# Patient Record
Sex: FEMALE | Race: WHITE | NOT HISPANIC OR LATINO | URBAN - METROPOLITAN AREA
[De-identification: names, ages, dates, MRNs, and addresses within clinical notes are randomized per-mention and may not be internally consistent; named-entity substitution may affect disease eponyms.]

---

## 2017-12-21 ENCOUNTER — APPOINTMENT (OUTPATIENT)
Dept: WOMENS IMAGING | Facility: HOSPITAL | Age: 52
End: 2017-12-21

## 2017-12-21 PROCEDURE — 77063 BREAST TOMOSYNTHESIS BI: CPT | Performed by: RADIOLOGY

## 2017-12-21 PROCEDURE — 77067 SCR MAMMO BI INCL CAD: CPT | Performed by: RADIOLOGY

## 2019-10-16 ENCOUNTER — APPOINTMENT (OUTPATIENT)
Dept: WOMENS IMAGING | Facility: HOSPITAL | Age: 54
End: 2019-10-16

## 2019-10-16 PROCEDURE — 77063 BREAST TOMOSYNTHESIS BI: CPT | Performed by: RADIOLOGY

## 2019-10-16 PROCEDURE — 77067 SCR MAMMO BI INCL CAD: CPT | Performed by: RADIOLOGY

## 2024-04-09 ENCOUNTER — APPOINTMENT (OUTPATIENT)
Dept: WOMENS IMAGING | Facility: HOSPITAL | Age: 59
End: 2024-04-09
Payer: COMMERCIAL

## 2024-04-09 PROCEDURE — 76642 ULTRASOUND BREAST LIMITED: CPT | Performed by: RADIOLOGY

## 2024-04-09 PROCEDURE — 77066 DX MAMMO INCL CAD BI: CPT | Performed by: RADIOLOGY

## 2024-04-09 PROCEDURE — 77062 BREAST TOMOSYNTHESIS BI: CPT | Performed by: RADIOLOGY

## 2024-04-09 PROCEDURE — G0279 TOMOSYNTHESIS, MAMMO: HCPCS | Performed by: RADIOLOGY

## 2024-04-16 ENCOUNTER — APPOINTMENT (OUTPATIENT)
Dept: WOMENS IMAGING | Facility: HOSPITAL | Age: 59
End: 2024-04-16
Payer: COMMERCIAL

## 2024-04-16 ENCOUNTER — LAB REQUISITION (OUTPATIENT)
Dept: LAB | Facility: HOSPITAL | Age: 59
End: 2024-04-16
Payer: COMMERCIAL

## 2024-04-16 DIAGNOSIS — N63.0 UNSPECIFIED LUMP IN UNSPECIFIED BREAST: ICD-10-CM

## 2024-04-16 PROCEDURE — 88360 TUMOR IMMUNOHISTOCHEM/MANUAL: CPT | Performed by: OBSTETRICS & GYNECOLOGY

## 2024-04-16 PROCEDURE — 88342 IMHCHEM/IMCYTCHM 1ST ANTB: CPT | Performed by: OBSTETRICS & GYNECOLOGY

## 2024-04-16 PROCEDURE — 88305 TISSUE EXAM BY PATHOLOGIST: CPT | Performed by: OBSTETRICS & GYNECOLOGY

## 2024-04-16 PROCEDURE — 38505 NEEDLE BIOPSY LYMPH NODES: CPT | Performed by: RADIOLOGY

## 2024-04-16 PROCEDURE — 19083 BX BREAST 1ST LESION US IMAG: CPT | Performed by: RADIOLOGY

## 2024-04-16 PROCEDURE — A4648 IMPLANTABLE TISSUE MARKER: HCPCS | Performed by: RADIOLOGY

## 2024-04-16 PROCEDURE — 88341 IMHCHEM/IMCYTCHM EA ADD ANTB: CPT | Performed by: OBSTETRICS & GYNECOLOGY

## 2024-04-18 ENCOUNTER — TELEPHONE (OUTPATIENT)
Dept: SURGERY | Facility: CLINIC | Age: 59
End: 2024-04-18
Payer: COMMERCIAL

## 2024-04-18 NOTE — TELEPHONE ENCOUNTER
New patient scheduled with Dr Mccain on 4/25 at 12:45 pm    Patient has been notified, gave verbal understanding and a new patient packet has been mailed.    Patient prep work sheet given to Halina to make chart /SD

## 2024-04-19 LAB
DX PRELIMINARY: NORMAL
LAB AP CASE REPORT: NORMAL
LAB AP CLINICAL INFORMATION: NORMAL
LAB AP DIAGNOSIS COMMENT: NORMAL
LAB AP SPECIAL STAINS: NORMAL
LAB AP SYNOPTIC CHECKLIST: NORMAL
PATH REPORT.FINAL DX SPEC: NORMAL
PATH REPORT.GROSS SPEC: NORMAL

## 2024-04-23 ENCOUNTER — TELEPHONE (OUTPATIENT)
Dept: SURGERY | Facility: CLINIC | Age: 59
End: 2024-04-23
Payer: COMMERCIAL

## 2024-04-23 NOTE — TELEPHONE ENCOUNTER
Called PT and left voicemail to reschedule her new PT appt with  due to  being in surgery at that time.    MEN

## 2024-04-24 ENCOUNTER — TELEPHONE (OUTPATIENT)
Dept: SURGERY | Facility: CLINIC | Age: 59
End: 2024-04-24
Payer: COMMERCIAL

## 2024-04-24 NOTE — TELEPHONE ENCOUNTER
Left PT a voicemail to reschedule her new PT appt tomorrow 04/25/2024 at 12:45 PM due to  being in surgery at this time.    MEN

## 2024-04-24 NOTE — PROGRESS NOTES
[facility]     Referring Provider: No ref. provider found     Chief complaint: breast mass and newly diagnosed breast cancer    Subjective    HPI: Ms. Sd Man is a 58 y.o. yo woman, seen at the request of Nely Jesus (OBGYN) for a new diagnosis of left breast cancer.      She noted an area of pain in her left breast that started several months ago.  She noted a palpable mass approximately 6 weeks ago.  She thought it might be due to increased size of the lymph node after a period of a scratchy throat.  She then presented to her OB/GYN who ordered diagnostic imaging and further workup.  She has not had recent imaging of her breast for screening.    She denies any prior history of abnormal mammograms or breast biopsies.     She reports she has a pertinent PMH of PCOS.  She has had significant stressors due to job changes and family health concerns including stressful care of her children over the past several years.  She underwent a screening colonoscopy and was found to have a suspicious polyp and close follow-up was recommended.  She has not been able to return for follow-up colonoscopy and is concerned there could be an underlying colon malignancy.  She also went through a complicated divorce before [facility] [place].    She was joined today in clinic by her friend. They all participated in the discussion, after her examination, and she gave her consent for them to participate.    Oncology/Hematology History    No history exists.       Review of Systems   Constitutional:  Negative for appetite change, fatigue and fever.   Respiratory:  Negative for cough and shortness of breath.    Gastrointestinal:  Negative for abdominal distention, diarrhea and nausea.   Musculoskeletal:  Negative for arthralgias and back pain.   Neurological:  Negative for dizziness, headaches and speech difficulty.   Psychiatric/Behavioral:  Positive for depression. Negative for decreased concentration and sleep disturbance. The patient is  nervous/anxious.        Medications:    Current Outpatient Medications:     azSTARys 39.2-7.8 MG capsule, , Disp: , Rfl:     Trintellix 10 MG tablet tablet, , Disp: , Rfl:     Allergies:  No Known Allergies    Medical history:  Past Medical History:   Diagnosis Date    ADHD     Depression     PCOS (polycystic ovarian syndrome)     PTSD (post-traumatic stress disorder)        Surgical History:  Past Surgical History:   Procedure Laterality Date    OOPHORECTOMY Bilateral 2014       Family History:  Family History   Problem Relation Age of Onset    Melanoma Mother 75        alive currently 79       Family Cancer History: relationship - (age of diagnosis/current age or age at death)  Breast: Denies  Ovarian: Denies  Colon: Denies  Pancreas: Denies  Prostate: Denies    Social History:   Social History     Socioeconomic History    Marital status:    Tobacco Use    Smoking status: Never     Passive exposure: Never    Smokeless tobacco: Never   Vaping Use    Vaping status: Never Used   Substance and Sexual Activity    Alcohol use: Never    Drug use: Never    Sexual activity: Not Currently       She lives by herself  She works as a , at [employer]         GYNECOLOGIC HISTORY:   G: 3. P: 3. AB: 0.  Last menstrual period: 47  Age at menarche: 14  Age at first childbirth: 32  Lactation/How lon years  Age at menopause: 47  Total years of oral contraceptive use: None  Total years of hormone replacement therapy: Several years      Objective   PHYSICAL EXAMINATION:   Vitals:    24 1332   BP: 158/82   Pulse: 101   Resp: 18   SpO2: 98%     Examination chaperoned by Halina.  ECOG 0 - Asymptomatic  General: NAD, well appearing  Psych: a&o x 3, anxious, tangential conversation  Eyes: EOMI, no scleral icterus  ENMT: neck supple without masses or thyromegaly, mucus membranes moist  Resp: normal effort  CV: RRR, no edema   GI: soft, NT, ND  MSK: normal gait, normal ROM in bilateral shoulders  Lymph nodes:   no cervical, supraclavicular or axillary lymphadenopathy, left axilla with palpable area in region of previous biopsy  Breast: symmetric, bra 42C, broad base of the breast  Right:  No visible abnormalities on inspection while seated, with arms raised or hands on hips. No masses, skin changes, or nipple abnormalities.  Left:  No visible abnormalities on inspection while seated. No skin changes, or nipple abnormalities.  Palpable 2 cm oval-shaped abnormality in left upper outer quadrant posterior.     Previous IMAGING: I have independently reviewed her imagin2024 bilateral diagnostic mammogram  The breasts are heterogeneously dense which may obscure small masses.  Finding 1: There is a high density irregular mass measuring 20 mm with indistinct margins seen in the posterior one third region left breast 2:00 11 cm from nipple.  Patient indicates a palpable lump or thickening in the left breast.  The symptomatic area is clearly marked.  In the right breast no suspicious masses significant calcifications or other abnormalities are seen.  Left breast ultrasound  Finding 1: Ultrasound demonstrates an irregular solid mass with indistinct margins measuring 20 x 17 x 20 mm seen in the posterior one third region of the left breast 2:00 11 cm from the nipple.  This correlates to the palpable area.  Finding 2: There is a lymph node measuring 20 x 9 x 13 mm seen in the left breast the cortex is prominent measuring up to 4 mm in thickness  Impression:  Finding 1: Solid mass in the left breast is suspicious.  Ultrasound-guided vacuum-assisted biopsy is recommended.  Finding 2: Lymph node in the left breast is suspicious FNA possible spring-loaded or limited core biopsy is recommended.  BI-RADS 4C    2024 left ultrasound-guided biopsy  Patient's left breast in the axillary position was imaged and the abnormal lymph node was localized.  Using a 12-gauge biopsy device 5 cores were obtained.  A HydroMARK tissue marker was  placed at the biopsy site.  2 view postprocedure mammogram and ultrasound confirmed the clip in the expected location.  Pathology returned as malignant and concordant.  Report describes a free-floating fragment of carcinoma, suspicious for metastasis.    PATHOLOGY:   4/16/24  1.  Left breast, 2:00, ultrasound-biopsies for mass: INVASIVE MAMMARY CARCINOMA, NO SPECIAL TYPE (INVASIVE DUCTAL CARCINOMA).               -Histologic grade: Abhi is logic score III (tubules = 3, nuclei = 3, mitosis = 2).               -No in situ component identified.               -Invasive carcinoma involves multiple tissue cores, measuring up to 14 mm maximally.               -No lymphovascular nor perineural invasion identified.     2. Lymph node, left axilla, core biopsies:               -Free floating fragment of carcinoma, suspicious for metastasis (see Comment).    Estrogen Receptor (ER) Status  Positive (greater than 10% of cells demonstrate nuclear positivity)   Percentage of Cells with Nuclear Positivity  %   Average Intensity of Staining  Strong   Test Type  Food and Drug Administration (FDA) cleared (test / vendor): ventana   Primary Antibody  SP1   Scoring System  Kathy   Proportion Score  5   Intensity Score  3   Total Kathy Score  8   Test(s) Performed     Progesterone Receptor (PgR) Status  Positive   Percentage of Cells with Nuclear Positivity  31-40%   Average Intensity of Staining  Moderate   Test Type  Food and Drug Administration (FDA) cleared (test / vendor): ventana   Primary Antibody  1E2   Scoring System  Kathy   Proportion Score  4   Intensity Score  2   Total Kathy Score  6   Test(s) Performed     HER2 by Immunohistochemistry  Negative (Score 1+)   Test Type  Food and Drug Administration (FDA) cleared (test / vendor): ventana   Primary Antibody  4B5   Test(s) Performed  Ki-67   Ki-67 Percentage of Positive Nuclei  95 %         Assessment & Plan   Assessment:  58 y.o. F with a new diagnosis of left  breast: Invasive ductal carcinoma grade III, ER/UT +, Her2 -; T1cN1/0M0, anatomic stage IB vs IIA, prognostic stage IB vs IA.      Discussion:  I had an extensive discussion with the patient and her family about the nature of her breast cancer diagnosis. We reviewed the components of breast tissue including ducts and lobules. We reviewed her pathology report in detail. We reviewed breast cancer histology, including stage, grade, ER/UT receptors, HER2 receptors and how this applies to her diagnosis. We reviewed the basics of systemic and local/regional management of breast cancer.      We discussed that in her case, systemic treatment would involve endocrine therapy and possibly chemotherapy. She will be referred to medical oncology postoperatively to discuss this further.     We reviewed potential surgical treatments to include partial mastectomy, mastectomy, sentinel lymph node biopsy and axillary node dissection and discussed the rationale associated with each approach. Regarding radiation therapy, we discussed that radiation is indicated in all cases of breast conservation and in only limited circumstances following mastectomy. Partial mastectomy with radiation and mastectomy were explained with option of reconstruction.  The patient was informed that from a survival standpoint, both procedures are oncologically equivalent.   We discussed that the primary goal of adjuvant radiation is to decrease the likelihood of local recurrence.       In her case, she is a good candidate for lumpectomy and she would like to proceed with breast conservation. We discussed that lumpectomy would require preoperative wire-localization. We also discussed the risk of positive margins and that she must have negative margins for lumpectomy to be an appropriate oncologic procedure. I will make every effort to obtain negative margins at her initial operation, but there is a 10-15% chance that she will require a second operation for  re-excision, or possibly a total mastectomy. We will not know the margin status until after her final pathology has returned.     I explained that most breast cancer is not hereditary, that I do not believe this plays a role in her case, and that she does not meet NCCN criteria for genetic testing. I would not recommend a bilateral mastectomy, since her risk of a second primary cancer is relatively low and endocrine therapy will further reduce her risk.  She is very concerned about her children's potential risk for breast cancer.  Discussed low likelihood of a genetic cause of her breast cancer.  And again reiterated per NCCN guidelines she does not meet criteria for genetic testing.    I described additional risks and potential complications associated with surgery, including, but not limited to, bleeding, infection, deformity/poor cosmetic result, chronic pain, numbness, seroma, hematoma, deep venous thrombosis, skin flap necrosis, disease recurrence and the possibility of requiring additional surgery. We also discussed other treatment options including the option of not undergoing any surgical treatment with a palliative rather than curative intent and the risks associated with this including disease progression. She expressed an understanding of these factors and wished to proceed.    We discussed axillary staging. I described the procedure for sentinel lymph node biopsy in detail, including the preoperative injection of a radiotracer and intraoperative injection of lymphazurin blue dye. I explained that this is a mapping test and not a cancer test, that all of the lymph nodes containing these dyes will be removed for complete testing by pathology, and that the results could impact the decision for adjuvant treatment or additional surgery.  The biopsy findings of a free-floating island of carcinoma and a lymph node within the breast, and clinically negative axilla would allow her to remain a candidate for  lumpectomy with sentinel lymph node biopsy, targeted excision of the previously biopsied lymph node within the breast.    I described additional risks and potential complications associated with surgery, including, but not limited to, bleeding, infection, complications related to blue dye, lymphedema, deformity/poor cosmetic result, chronic pain, neurovascular injury, numbness, seroma, hematoma, deep venous thrombosis, skin flap necrosis, disease recurrence and the possibility of requiring additional surgery. We also discussed other treatment options including the option of not undergoing any surgical treatment and the risks associated with this including disease progression. She expressed an understanding of these factors and wished to proceed.      Plan:  A multidisciplinary plan has been formulated for the patient:      # Breast Surgical Oncology:  -She is interested in possibly obtaining surgery and her cancer care in [place].  Discussed all surgical options of partial mastectomy (lumpectomy) versus mastectomy with sentinel lymph node sampling.  She will require preoperative localization of the lymph node and possibly localization of the breast mass depending on her final surgical decision.  - There may be a role for breast MRI to further delineate extent of disease versus multicentric breast cancer regarding lymph node finding.  - will need preop lab evaluation as listed below  -If she undergoes surgery here in [place] she would like to wait until the end of May if possible.    # Medical Oncology:  - Will refer postoperatively.  - Is Oncotype candidate  - would recommend 5 to 10 years of hormone blockade  - She is hesitant for any significant side effects that will make her feel ill including nausea, diarrhea, fatigue, hair loss.  I described the use of the Oncotype to help guide shared decision making regarding benefit of chemotherapy.    #  Radiation Oncology:  -Will refer postoperatively.    # Nurse  "Navigation  - Referral made today, very concerned about risk of side effects from treatment.   - not established with her PCP, and needs to be re-established   - family lives out of town, children with significant mental health concerns causing additional stress on top of recent cancer diagnoses.     # Lymphedema clinic  - Referral was placed today     # Breast Imaging  - Next Screening mammogram due: 4/2025  -Consider breast MRI pending surgical decision making    #General Medical work up  -Last PCP visit was in 2022, recommended medication regimen with metformin and insulin.  Will need to obtain preoperative evaluation of A1c to better delineate risk of wound healing problems.  Plan to complete preop EKG as well.  - looking for new PCP, discussed new PCP within [facility] if interested.   - Discussed role of follow up colonoscopy and she plans to follow-up on her own, does not require referral.  - after clinical visit - additional chart review noted additional problems listed from stephanie including abnormal cologuard in the past, in addition to her report of \"abnormal colonoscopy\"      Moira Mccain MD  Breast Surgical Oncology    I spent 110 minutes caring for Ms Man on this date of service. This time includes time spent by me in the following activities: preparing for the visit, reviewing tests, obtaining and/or reviewing a separately obtained history, performing a medically appropriate examination and/or evaluation , counseling and educating the patient/family/caregiver, referring and communicating with other health care professionals , documenting information in the medical record, independently interpreting results and communicating that information with the patient/family/caregiver, and care coordination.      CC:  Nely Jesus  "

## 2024-04-25 ENCOUNTER — TELEPHONE (OUTPATIENT)
Dept: SURGERY | Facility: CLINIC | Age: 59
End: 2024-04-25
Payer: COMMERCIAL

## 2024-04-25 ENCOUNTER — OFFICE VISIT (OUTPATIENT)
Dept: SURGERY | Facility: CLINIC | Age: 59
End: 2024-04-25
Payer: COMMERCIAL

## 2024-04-25 VITALS
HEART RATE: 101 BPM | WEIGHT: 242.4 LBS | OXYGEN SATURATION: 98 % | HEIGHT: 64 IN | RESPIRATION RATE: 18 BRPM | DIASTOLIC BLOOD PRESSURE: 82 MMHG | BODY MASS INDEX: 41.38 KG/M2 | SYSTOLIC BLOOD PRESSURE: 158 MMHG

## 2024-04-25 DIAGNOSIS — Z17.0 MALIGNANT NEOPLASM OF UPPER-OUTER QUADRANT OF LEFT BREAST IN FEMALE, ESTROGEN RECEPTOR POSITIVE: Primary | ICD-10-CM

## 2024-04-25 DIAGNOSIS — C50.412 MALIGNANT NEOPLASM OF UPPER-OUTER QUADRANT OF LEFT BREAST IN FEMALE, ESTROGEN RECEPTOR POSITIVE: Primary | ICD-10-CM

## 2024-04-25 PROBLEM — R19.5 POSITIVE COLORECTAL CANCER SCREENING USING COLOGUARD TEST: Status: ACTIVE | Noted: 2020-05-28

## 2024-04-25 PROBLEM — F90.0 ATTENTION DEFICIT HYPERACTIVITY DISORDER (ADHD), PREDOMINANTLY INATTENTIVE TYPE: Status: ACTIVE | Noted: 2017-03-18

## 2024-04-25 PROBLEM — E55.9 VITAMIN D DEFICIENCY: Status: ACTIVE | Noted: 2017-03-18

## 2024-04-25 RX ORDER — VORTIOXETINE 10 MG/1
TABLET, FILM COATED ORAL
COMMUNITY
Start: 2024-04-24

## 2024-04-25 RX ORDER — SERDEXMETHYLPHENIDATE AND DEXMETHYLPHENIDATE 7.8; 39.2 MG/1; MG/1
CAPSULE ORAL
COMMUNITY
Start: 2024-03-23

## 2024-04-25 NOTE — LETTER
April 25, 2024       No Recipients    Patient: Sd Man   YOB: 1965   Date of Visit: 4/25/2024     Dear Nely Jesus MD:       Thank you for referring Sd Man to me for evaluation. Below are the relevant portions of my assessment and plan of care.    If you have questions, please do not hesitate to call me. I look forward to following Sd along with you.         Sincerely,        Moira Mccain MD        CC:   No Recipients    Moira Mccain MD  04/25/24 1950  Sign when Signing Visit  [facility]     Referring Provider: No ref. provider found     Chief complaint: breast mass and newly diagnosed breast cancer    Subjective   HPI: Ms. Sd Man is a 58 y.o. yo woman, seen at the request of Nely Jesus (OBGYN) for a new diagnosis of left breast cancer.      She noted an area of pain in her left breast that started several months ago.  She noted a palpable mass approximately 6 weeks ago.  She thought it might be due to increased size of the lymph node after a period of a scratchy throat.  She then presented to her OB/GYN who ordered diagnostic imaging and further workup.  She has not had recent imaging of her breast for screening.    She denies any prior history of abnormal mammograms or breast biopsies.     She reports she has a pertinent PMH of PCOS.  She has had significant stressors due to job changes and family health concerns including stressful care of her children over the past several years.  She underwent a screening colonoscopy and was found to have a suspicious polyp and close follow-up was recommended.  She has not been able to return for follow-up colonoscopy and is concerned there could be an underlying colon malignancy.  She also went through a complicated divorce before [facility].    She was joined today in clinic by her friend. They all participated in the discussion, after her examination, and she gave her consent for them to participate.    Oncology/Hematology History     No history exists.       Review of Systems   Constitutional:  Negative for appetite change, fatigue and fever.   Respiratory:  Negative for cough and shortness of breath.    Gastrointestinal:  Negative for abdominal distention, diarrhea and nausea.   Musculoskeletal:  Negative for arthralgias and back pain.   Neurological:  Negative for dizziness, headaches and speech difficulty.   Psychiatric/Behavioral:  Positive for depression. Negative for decreased concentration and sleep disturbance. The patient is nervous/anxious.        Medications:    Current Outpatient Medications:   •  azSTARys 39.2-7.8 MG capsule, , Disp: , Rfl:   •  Trintellix 10 MG tablet tablet, , Disp: , Rfl:     Allergies:  No Known Allergies    Medical history:  Past Medical History:   Diagnosis Date   • ADHD    • Depression    • PCOS (polycystic ovarian syndrome)    • PTSD (post-traumatic stress disorder)        Surgical History:  Past Surgical History:   Procedure Laterality Date   • OOPHORECTOMY Bilateral 2014       Family History:  Family History   Problem Relation Age of Onset   • Melanoma Mother 75        alive currently 79       Family Cancer History: relationship - (age of diagnosis/current age or age at death)  Breast: Denies  Ovarian: Denies  Colon: Denies  Pancreas: Denies  Prostate: Denies    Social History:   Social History     Socioeconomic History   • Marital status:    Tobacco Use   • Smoking status: Never     Passive exposure: Never   • Smokeless tobacco: Never   Vaping Use   • Vaping status: Never Used   Substance and Sexual Activity   • Alcohol use: Never   • Drug use: Never   • Sexual activity: Not Currently       She lives by herself  She works as a , at [employer]         GYNECOLOGIC HISTORY:   G: 3. P: 3. AB: 0.  Last menstrual period: 47  Age at menarche: 14  Age at first childbirth: 32  Lactation/How lon years  Age at menopause: 47  Total years of oral contraceptive use: None  Total years of  hormone replacement therapy: Several years      Objective  PHYSICAL EXAMINATION:   Vitals:    24 1332   BP: 158/82   Pulse: 101   Resp: 18   SpO2: 98%     Examination chaperoned by Halina.  ECOG 0 - Asymptomatic  General: NAD, well appearing  Psych: a&o x 3, anxious, tangential conversation  Eyes: EOMI, no scleral icterus  ENMT: neck supple without masses or thyromegaly, mucus membranes moist  Resp: normal effort  CV: RRR, no edema   GI: soft, NT, ND  MSK: normal gait, normal ROM in bilateral shoulders  Lymph nodes:  no cervical, supraclavicular or axillary lymphadenopathy, left axilla with palpable area in region of previous biopsy  Breast: symmetric, bra 42C, broad base of the breast  Right:  No visible abnormalities on inspection while seated, with arms raised or hands on hips. No masses, skin changes, or nipple abnormalities.  Left:  No visible abnormalities on inspection while seated. No skin changes, or nipple abnormalities.  Palpable 2 cm oval-shaped abnormality in left upper outer quadrant posterior.     Previous IMAGING: I have independently reviewed her imagin2024 bilateral diagnostic mammogram  The breasts are heterogeneously dense which may obscure small masses.  Finding 1: There is a high density irregular mass measuring 20 mm with indistinct margins seen in the posterior one third region left breast 2:00 11 cm from nipple.  Patient indicates a palpable lump or thickening in the left breast.  The symptomatic area is clearly marked.  In the right breast no suspicious masses significant calcifications or other abnormalities are seen.  Left breast ultrasound  Finding 1: Ultrasound demonstrates an irregular solid mass with indistinct margins measuring 20 x 17 x 20 mm seen in the posterior one third region of the left breast 2:00 11 cm from the nipple.  This correlates to the palpable area.  Finding 2: There is a lymph node measuring 20 x 9 x 13 mm seen in the left breast the cortex is prominent  measuring up to 4 mm in thickness  Impression:  Finding 1: Solid mass in the left breast is suspicious.  Ultrasound-guided vacuum-assisted biopsy is recommended.  Finding 2: Lymph node in the left breast is suspicious FNA possible spring-loaded or limited core biopsy is recommended.  BI-RADS 4C    4/16/2024 left ultrasound-guided biopsy  Patient's left breast in the axillary position was imaged and the abnormal lymph node was localized.  Using a 12-gauge biopsy device 5 cores were obtained.  A HydroMARK tissue marker was placed at the biopsy site.  2 view postprocedure mammogram and ultrasound confirmed the clip in the expected location.  Pathology returned as malignant and concordant.  Report describes a free-floating fragment of carcinoma, suspicious for metastasis.    PATHOLOGY:   4/16/24  1.  Left breast, 2:00, ultrasound-biopsies for mass: INVASIVE MAMMARY CARCINOMA, NO SPECIAL TYPE (INVASIVE DUCTAL CARCINOMA).               -Histologic grade: Abhi is logic score III (tubules = 3, nuclei = 3, mitosis = 2).               -No in situ component identified.               -Invasive carcinoma involves multiple tissue cores, measuring up to 14 mm maximally.               -No lymphovascular nor perineural invasion identified.     2. Lymph node, left axilla, core biopsies:               -Free floating fragment of carcinoma, suspicious for metastasis (see Comment).    Estrogen Receptor (ER) Status  Positive (greater than 10% of cells demonstrate nuclear positivity)   Percentage of Cells with Nuclear Positivity  %   Average Intensity of Staining  Strong   Test Type  Food and Drug Administration (FDA) cleared (test / vendor): ventana   Primary Antibody  SP1   Scoring System  Kathy   Proportion Score  5   Intensity Score  3   Total Kathy Score  8   Test(s) Performed     Progesterone Receptor (PgR) Status  Positive   Percentage of Cells with Nuclear Positivity  31-40%   Average Intensity of Staining  Moderate    Test Type  Food and Drug Administration (FDA) cleared (test / vendor): ventana   Primary Antibody  1E2   Scoring System  Kathy   Proportion Score  4   Intensity Score  2   Total Kathy Score  6   Test(s) Performed     HER2 by Immunohistochemistry  Negative (Score 1+)   Test Type  Food and Drug Administration (FDA) cleared (test / vendor): ventana   Primary Antibody  4B5   Test(s) Performed  Ki-67   Ki-67 Percentage of Positive Nuclei  95 %         Assessment & Plan  Assessment:  58 y.o. F with a new diagnosis of left breast: Invasive ductal carcinoma grade III, ER/HI +, Her2 -; T1cN1/0M0, anatomic stage IB vs IIA, prognostic stage IB vs IA.      Discussion:  I had an extensive discussion with the patient and her family about the nature of her breast cancer diagnosis. We reviewed the components of breast tissue including ducts and lobules. We reviewed her pathology report in detail. We reviewed breast cancer histology, including stage, grade, ER/HI receptors, HER2 receptors and how this applies to her diagnosis. We reviewed the basics of systemic and local/regional management of breast cancer.      We discussed that in her case, systemic treatment would involve endocrine therapy and possibly chemotherapy. She will be referred to medical oncology postoperatively to discuss this further.     We reviewed potential surgical treatments to include partial mastectomy, mastectomy, sentinel lymph node biopsy and axillary node dissection and discussed the rationale associated with each approach. Regarding radiation therapy, we discussed that radiation is indicated in all cases of breast conservation and in only limited circumstances following mastectomy. Partial mastectomy with radiation and mastectomy were explained with option of reconstruction.  The patient was informed that from a survival standpoint, both procedures are oncologically equivalent.   We discussed that the primary goal of adjuvant radiation is to decrease  the likelihood of local recurrence.       In her case, she is a good candidate for lumpectomy and she would like to proceed with breast conservation. We discussed that lumpectomy would require preoperative wire-localization. We also discussed the risk of positive margins and that she must have negative margins for lumpectomy to be an appropriate oncologic procedure. I will make every effort to obtain negative margins at her initial operation, but there is a 10-15% chance that she will require a second operation for re-excision, or possibly a total mastectomy. We will not know the margin status until after her final pathology has returned.     I explained that most breast cancer is not hereditary, that I do not believe this plays a role in her case, and that she does not meet NCCN criteria for genetic testing. I would not recommend a bilateral mastectomy, since her risk of a second primary cancer is relatively low and endocrine therapy will further reduce her risk.  She is very concerned about her children's potential risk for breast cancer.  Discussed low likelihood of a genetic cause of her breast cancer.  And again reiterated per NCCN guidelines she does not meet criteria for genetic testing.    I described additional risks and potential complications associated with surgery, including, but not limited to, bleeding, infection, deformity/poor cosmetic result, chronic pain, numbness, seroma, hematoma, deep venous thrombosis, skin flap necrosis, disease recurrence and the possibility of requiring additional surgery. We also discussed other treatment options including the option of not undergoing any surgical treatment with a palliative rather than curative intent and the risks associated with this including disease progression. She expressed an understanding of these factors and wished to proceed.    We discussed axillary staging. I described the procedure for sentinel lymph node biopsy in detail, including the  preoperative injection of a radiotracer and intraoperative injection of lymphazurin blue dye. I explained that this is a mapping test and not a cancer test, that all of the lymph nodes containing these dyes will be removed for complete testing by pathology, and that the results could impact the decision for adjuvant treatment or additional surgery.  The biopsy findings of a free-floating island of carcinoma and a lymph node within the breast, and clinically negative axilla would allow her to remain a candidate for lumpectomy with sentinel lymph node biopsy, targeted excision of the previously biopsied lymph node within the breast.    I described additional risks and potential complications associated with surgery, including, but not limited to, bleeding, infection, complications related to blue dye, lymphedema, deformity/poor cosmetic result, chronic pain, neurovascular injury, numbness, seroma, hematoma, deep venous thrombosis, skin flap necrosis, disease recurrence and the possibility of requiring additional surgery. We also discussed other treatment options including the option of not undergoing any surgical treatment and the risks associated with this including disease progression. She expressed an understanding of these factors and wished to proceed.      Plan:  A multidisciplinary plan has been formulated for the patient:      # Breast Surgical Oncology:  -She is interested in possibly obtaining surgery and her cancer care in [place].  Discussed all surgical options of partial mastectomy (lumpectomy) versus mastectomy with sentinel lymph node sampling.  She will require preoperative localization of the lymph node and possibly localization of the breast mass depending on her final surgical decision.  - There may be a role for breast MRI to further delineate extent of disease versus multicentric breast cancer regarding lymph node finding.  - will need preop lab evaluation as listed below  -If she undergoes  "surgery here in [place] she would like to wait until the end of May if possible.    # Medical Oncology:  - Will refer postoperatively.  - Is Oncotype candidate  - would recommend 5 to 10 years of hormone blockade  - She is hesitant for any significant side effects that will make her feel ill including nausea, diarrhea, fatigue, hair loss.  I described the use of the Oncotype to help guide shared decision making regarding benefit of chemotherapy.    #  Radiation Oncology:  -Will refer postoperatively.    # Nurse Navigation  - Referral made today, very concerned about risk of side effects from treatment.   - not established with her PCP, and needs to be re-established   - family lives out of town, children with significant mental health concerns causing additional stress on top of recent cancer diagnoses.     # Lymphedema clinic  - Referral was placed today     # Breast Imaging  - Next Screening mammogram due: 4/2025  -Consider breast MRI pending surgical decision making    #General Medical work up  -Last PCP visit was in 2022, recommended medication regimen with metformin and insulin.  Will need to obtain preoperative evaluation of A1c to better delineate risk of wound healing problems.  Plan to complete preop EKG as well.  - looking for new PCP, discussed new PCP within [place] if interested.   - Discussed role of follow up colonoscopy and she plans to follow-up on her own, does not require referral.  - after clinical visit - additional chart review noted additional problems listed from stephanie including abnormal cologuard in the past, in addition to her report of \"abnormal colonoscopy\"      Moira Mccain MD  Breast Surgical Oncology    I spent 110 minutes caring for Ms Man on this date of service. This time includes time spent by me in the following activities: preparing for the visit, reviewing tests, obtaining and/or reviewing a separately obtained history, performing a medically appropriate examination and/or " evaluation , counseling and educating the patient/family/caregiver, referring and communicating with other health care professionals , documenting information in the medical record, independently interpreting results and communicating that information with the patient/family/caregiver, and care coordination.      CC:  Nely Jesus

## 2024-04-25 NOTE — TELEPHONE ENCOUNTER
Left message with PT that  would like to see PT today at 01:00 PM due to her being in surgery at 12:45 PM.    MEN

## 2024-04-25 NOTE — TELEPHONE ENCOUNTER
Left PT a voicemail about her appointment today with  at 12:45 PM.  said she would like for her to come in at 01:00 PM after her surgery today.    MEN

## 2024-04-29 ENCOUNTER — PATIENT OUTREACH (OUTPATIENT)
Dept: OTHER | Facility: HOSPITAL | Age: 59
End: 2024-04-29
Payer: COMMERCIAL

## 2024-04-29 ENCOUNTER — TELEPHONE (OUTPATIENT)
Dept: SURGERY | Facility: CLINIC | Age: 59
End: 2024-04-29
Payer: COMMERCIAL

## 2024-04-29 NOTE — TELEPHONE ENCOUNTER
4/30/2024  Called Ms Valenzuela to discuss next steps. Recommend Breast MRI, order was placed today.   Also will have her follow up in the office to sign consents and go over all results.   The office will call her to confirm these results.    NO answer but left a message per patients request about plan.  Will discuss in the office soon.          Caller: ROLANDO VALENZUELA    Relationship: SELF    Best call back number: [phone]     What is the best time to reach you: ANYTIME    Who are you requesting to speak with (clinical staff, provider,  specific staff member): SURGERY SCHEDULER    Do you know the name of the person who called:     What was the call regarding: PT IS WANTING TO GO AHEAD AND SCHEDULE SURGERY W/ DR CHOWDHURY INSTEAD OF PURSUING IN [place]    Is it okay if the provider responds through [employer]: YES, ALSO OKAY TO LEAVE DETAILED VM

## 2024-04-30 ENCOUNTER — TELEPHONE (OUTPATIENT)
Dept: SURGERY | Facility: CLINIC | Age: 59
End: 2024-04-30
Payer: COMMERCIAL

## 2024-04-30 DIAGNOSIS — C50.412 MALIGNANT NEOPLASM OF UPPER-OUTER QUADRANT OF LEFT BREAST IN FEMALE, ESTROGEN RECEPTOR POSITIVE: Primary | ICD-10-CM

## 2024-04-30 DIAGNOSIS — Z17.0 MALIGNANT NEOPLASM OF UPPER-OUTER QUADRANT OF LEFT BREAST IN FEMALE, ESTROGEN RECEPTOR POSITIVE: Primary | ICD-10-CM

## 2024-04-30 NOTE — TELEPHONE ENCOUNTER
I called Ms. Man to discuss her upcoming appts with Dr. Mccain.    MRI: 05/10/2024 at 03:45 PM    Follow up with Dr. Mccain: 05/15/2024 at 02:00 PM    PT wanted to know why shehad to have the MRI done before seeing  again. I explained that Dr. Mccain wants to have more imaging done before they discuss surgery options. PT requested a call back from practice manger.    MEN

## 2024-04-30 NOTE — PROGRESS NOTES
Referral received from Dr. Mccain's office. I called Ms. Man and introduced myself and navigational services. She stated the consult with Dr. Mccain went well and she is leaning toward a lumpectomy.  She has a good understanding of her pathology and treatment options. She was able to verbalize teach back on her plan of care.      She stated she has a wonderful support system with friends, coworkers and family. She stated she feels comfortable talking to them about needs or issues.      She stated she has no financial or transportation concerns at this time. She has no resource needs or ongoing concerns at this time.     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 She is concerned radiation may make her too fatigued to do her job well. Encouraged her to talk with Dr. Mccain and her radiation oncologist about these symptoms in more depth and to work with their offices for any FMLA or short term disability forms she may need. She stated she would do that.      She stated she has been anxious about her diagnosis and we discussed that can be normal. She is seeing a therapist regularly and will continue to do so throughout treatment. She stated her daughter struggles with mental health issues and they are working with her on support. We discussed we have support options for herself as she works through her diagnosis if needed. She was thankful for the information.      We discussed integrative therapies and other [facility]. She will received a navigation folder with the following information in the mail:     [facility], Cancer and Restorative Exercise (CARE), [employer] Exercise program, Guide for the Newly Diagnosed, [employer], [facility], Massage Therapy, Reiki Therapy, [employer] [place], Cancer Nutrition, and Survivorship Clinic.     She verbalized appreciation for navigational services and  she has my contact information and will call with any questions that arise.

## 2024-05-01 ENCOUNTER — TELEPHONE (OUTPATIENT)
Dept: SURGERY | Facility: CLINIC | Age: 59
End: 2024-05-01
Payer: COMMERCIAL

## 2024-05-01 NOTE — TELEPHONE ENCOUNTER
Attempted x 2 to reach this pt regarding her confusion surrounding her surgery scheduling.    Pt is confused as to why an MRI is needed prior to surgery.    This is needed for her surgical planning and would have been ordered at the time of her visit btu Sd was not sure if she wanted to proceed with having surgery here or out of state.  Once she called back to let us know she would like to proceed the MRI was ordered.   She still needs to have her breast imaged prior to her appointment to plan her surgery with Dr Mccain.     CMA

## 2024-05-10 ENCOUNTER — HOSPITAL ENCOUNTER (OUTPATIENT)
Dept: MRI IMAGING | Facility: HOSPITAL | Age: 59
Discharge: HOME | End: 2024-05-10
Admitting: STUDENT IN AN ORGANIZED HEALTH CARE EDUCATION/TRAINING PROGRAM
Payer: COMMERCIAL

## 2024-05-10 DIAGNOSIS — Z17.0 MALIGNANT NEOPLASM OF UPPER-OUTER QUADRANT OF LEFT BREAST IN FEMALE, ESTROGEN RECEPTOR POSITIVE: ICD-10-CM

## 2024-05-10 DIAGNOSIS — C50.412 MALIGNANT NEOPLASM OF UPPER-OUTER QUADRANT OF LEFT BREAST IN FEMALE, ESTROGEN RECEPTOR POSITIVE: ICD-10-CM

## 2024-05-10 PROCEDURE — 0 GADOBENATE DIMEGLUMINE 529 MG/ML SOLUTION: Performed by: STUDENT IN AN ORGANIZED HEALTH CARE EDUCATION/TRAINING PROGRAM

## 2024-05-10 PROCEDURE — 77049 MRI BREAST C-+ W/CAD BI: CPT

## 2024-05-10 PROCEDURE — A9577 INJ MULTIHANCE: HCPCS | Performed by: STUDENT IN AN ORGANIZED HEALTH CARE EDUCATION/TRAINING PROGRAM

## 2024-05-10 RX ADMIN — GADOBENATE DIMEGLUMINE 20 ML: 529 INJECTION, SOLUTION INTRAVENOUS at 17:26

## 2024-05-13 DIAGNOSIS — Z17.0 MALIGNANT NEOPLASM OF UPPER-OUTER QUADRANT OF LEFT BREAST IN FEMALE, ESTROGEN RECEPTOR POSITIVE: Primary | ICD-10-CM

## 2024-05-13 DIAGNOSIS — C50.412 MALIGNANT NEOPLASM OF UPPER-OUTER QUADRANT OF LEFT BREAST IN FEMALE, ESTROGEN RECEPTOR POSITIVE: Primary | ICD-10-CM

## 2024-05-14 ENCOUNTER — PATIENT OUTREACH (OUTPATIENT)
Dept: OTHER | Facility: HOSPITAL | Age: 59
End: 2024-05-14
Payer: COMMERCIAL

## 2024-05-14 NOTE — PROGRESS NOTES
Called Ms. Man to see how she was doing. Left a message with my contact information and asked her to call back at her convenience.

## 2024-05-15 ENCOUNTER — OFFICE VISIT (OUTPATIENT)
Dept: SURGERY | Facility: CLINIC | Age: 59
End: 2024-05-15
Payer: COMMERCIAL

## 2024-05-15 VITALS
DIASTOLIC BLOOD PRESSURE: 76 MMHG | OXYGEN SATURATION: 97 % | WEIGHT: 242 LBS | HEART RATE: 107 BPM | BODY MASS INDEX: 41.32 KG/M2 | HEIGHT: 64 IN | SYSTOLIC BLOOD PRESSURE: 138 MMHG

## 2024-05-15 DIAGNOSIS — C50.412 MALIGNANT NEOPLASM OF UPPER-OUTER QUADRANT OF LEFT BREAST IN FEMALE, ESTROGEN RECEPTOR POSITIVE: Primary | ICD-10-CM

## 2024-05-15 DIAGNOSIS — Z17.0 MALIGNANT NEOPLASM OF UPPER-OUTER QUADRANT OF LEFT BREAST IN FEMALE, ESTROGEN RECEPTOR POSITIVE: Primary | ICD-10-CM

## 2024-05-15 PROCEDURE — 99214 OFFICE O/P EST MOD 30 MIN: CPT | Performed by: STUDENT IN AN ORGANIZED HEALTH CARE EDUCATION/TRAINING PROGRAM

## 2024-05-15 RX ORDER — DEXTROAMPHETAMINE SACCHARATE, AMPHETAMINE ASPARTATE, DEXTROAMPHETAMINE SULFATE AND AMPHETAMINE SULFATE 3.125; 3.125; 3.125; 3.125 MG/1; MG/1; MG/1; MG/1
TABLET ORAL
COMMUNITY
Start: 2024-04-24

## 2024-05-15 NOTE — PROGRESS NOTES
[facility]     Referring Provider: Pau Jesus MD     Chief complaint: breast mass and newly diagnosed breast cancer    Subjective    HPI: Ms. Sd Man is a 58 y.o. yo woman, seen at the request of Nely Jesus (OBGYN) for a new diagnosis of left breast cancer.      She noted an area of pain in her left breast that started several months ago.  She noted a palpable mass approximately 6 weeks ago.  She thought it might be due to increased size of the lymph node after a period of a scratchy throat.  She then presented to her OB/GYN who ordered diagnostic imaging and further workup.  She has not had recent imaging of her breast for screening.    She denies any prior history of abnormal mammograms or breast biopsies.     She reports she has a pertinent PMH of PCOS.  She has had significant stressors due to job changes and family health concerns including stressful care of her children over the past several years.  She underwent a screening colonoscopy and was found to have a suspicious polyp and close follow-up was recommended.  She has not been able to return for follow-up colonoscopy and is concerned there could be an underlying colon malignancy.  She also went through a complicated divorce before [facility].    She was joined today in clinic by her friend. They all participated in the discussion, after her examination, and she gave her consent for them to participate.    HPI 5/15/24  She completed breast MRI.  She notes the lesion in her left lateral breast feels subjectively larger.  The area continues to be painful and heavy feeling.  Denies any new symptoms.    Oncology/Hematology History   Malignant neoplasm of upper-outer quadrant of left breast in female, estrogen receptor positive   4/16/2024 Cancer Staged    Staging form: Breast, AJCC 8th Edition  - Clinical stage from 4/16/2024: Stage IIB (cT2, cN1(f), cM0, G3, ER+, IA+, HER2-) - Signed by Moira Mccain MD on 5/15/2024     4/30/2024 Initial  Diagnosis    Malignant neoplasm of upper-outer quadrant of left breast in female, estrogen receptor positive         Review of Systems   Constitutional:  Negative for appetite change, fatigue and fever.   Respiratory:  Negative for cough and shortness of breath.    Gastrointestinal:  Negative for abdominal distention, diarrhea and nausea.   Musculoskeletal:  Negative for arthralgias and back pain.   Neurological:  Negative for dizziness, headaches and speech difficulty.   Psychiatric/Behavioral:  Positive for depression. Negative for decreased concentration and sleep disturbance. The patient is nervous/anxious.        Medications:    Current Outpatient Medications:     amphetamine-dextroamphetamine (ADDERALL) 12.5 MG tablet, TAKE 1 TABLET BY MOUTH TWICE DAILY IN THE PM, Disp: , Rfl:     azSTARys 39.2-7.8 MG capsule, , Disp: , Rfl:     Trintellix 10 MG tablet tablet, , Disp: , Rfl:     Allergies:  No Known Allergies    Medical history:  Past Medical History:   Diagnosis Date    ADHD     Depression     Obesity 11/11/2012    PCOS (polycystic ovarian syndrome)     PTSD (post-traumatic stress disorder)        Surgical History:  Past Surgical History:   Procedure Laterality Date    OOPHORECTOMY Bilateral 01/01/2014       Family History:  Family History   Problem Relation Age of Onset    Melanoma Mother 75        alive currently 79       Family Cancer History: relationship - (age of diagnosis/current age or age at death)  Breast: Denies  Ovarian: Denies  Colon: Denies  Pancreas: Denies  Prostate: Denies    Social History:   Social History     Socioeconomic History    Marital status:    Tobacco Use    Smoking status: Never     Passive exposure: Never    Smokeless tobacco: Never   Vaping Use    Vaping status: Never Used   Substance and Sexual Activity    Alcohol use: Never    Drug use: Never    Sexual activity: Not Currently       She lives by herself  She works as a , at [employer]  HISTORY:   G: 3. P: 3. AB: 0.  Last menstrual period: 47  Age at menarche: 14  Age at first childbirth: 32  Lactation/How lon years  Age at menopause: 47  Total years of oral contraceptive use: None  Total years of hormone replacement therapy: Several years      Objective   PHYSICAL EXAMINATION:   Vitals:    05/15/24 1359   BP: 138/76   Pulse: 107   SpO2: 97%       Examination chaperoned by Halina.  ECOG 0 - Asymptomatic  General: NAD, well appearing  Psych: a&o x 3, anxious, tangential conversation  Eyes: EOMI, no scleral icterus  ENMT: neck supple without masses or thyromegaly, mucus membranes moist  Resp: normal effort  CV: RRR, no edema   GI: soft, NT, ND  MSK: normal gait, normal ROM in bilateral shoulders  Lymph nodes:  no cervical, supraclavicular or axillary lymphadenopathy, left axilla with palpable area in region of previous biopsy  Breast: symmetric, bra 42C, broad base of the breast  Right:  No visible abnormalities on inspection while seated, with arms raised or hands on hips. No masses, skin changes, or nipple abnormalities.  Left:  No visible abnormalities on inspection while seated. No skin changes, or nipple abnormalities.  Palpable 2 cm oval-shaped abnormality in left upper outer quadrant posterior.     Previous IMAGING: I have independently reviewed her imagin2024 bilateral diagnostic mammogram  The breasts are heterogeneously dense which may obscure small masses.  Finding 1: There is a high density irregular mass measuring 20 mm with indistinct margins seen in the posterior one third region left breast 2:00 11 cm from nipple.  Patient indicates a palpable lump or thickening in the left breast.  The symptomatic area is clearly marked.  In the right breast no suspicious masses significant calcifications or other abnormalities are seen.  Left breast ultrasound  Finding 1: Ultrasound demonstrates an irregular solid mass with indistinct margins measuring 20 x 17 x 20 mm seen in the posterior  one third region of the left breast 2:00 11 cm from the nipple.  This correlates to the palpable area.  Finding 2: There is a lymph node measuring 20 x 9 x 13 mm seen in the left breast the cortex is prominent measuring up to 4 mm in thickness  Impression:  Finding 1: Solid mass in the left breast is suspicious.  Ultrasound-guided vacuum-assisted biopsy is recommended.  Finding 2: Lymph node in the left breast is suspicious FNA possible spring-loaded or limited core biopsy is recommended.  BI-RADS 4C    4/16/2024 left ultrasound-guided biopsy  Patient's left breast in the axillary position was imaged and the abnormal lymph node was localized.  Using a 12-gauge biopsy device 5 cores were obtained.  A HydroMARK tissue marker was placed at the biopsy site.  2 view postprocedure mammogram and ultrasound confirmed the clip in the expected location.  Pathology returned as malignant and concordant.  Report describes a free-floating fragment of carcinoma, suspicious for metastasis.      5/10/24 Breast MRI  FINDINGS: Scattered fibroglandular tissue is seen throughout both  breast. Mild background parenchymal enhancement of both breasts is  noted.     In the posterior one third lateral aspect of the left breast at the 3  o'clock position on the order of 12 cm from the nipple. There is an  irregular enhancing mass that measures 2.6 cm in anterior to posterior  dimension, 2.4 cm in the superior to inferior dimension and 2.7 cm in  the mediolateral dimension. A focal signal void is seen centrally within  the mass. This represents the biopsy-proven site of malignancy with the  internal mini cork shaped metallic clip.     No other areas of suspicious enhancement or morphology are seen in the  left breast. I see no evidence for abnormal skin, nipple or chest wall  enhancement of the left breast. There is an inferior left axillary lymph  node that measures on the order of 2 cm in greatest dimension and shows  diffuse cortical  thickening with the cortex measuring up to 0.8 cm in  thickness. Mild surrounding edema is noted. This is in the expected  location of the biopsied lymph node with the associated coil-shaped  HydroMARK metallic clip at the inferior margin seen on the mammogram  dated 04/16/2024. No evidence for left internal mammary chain adenopathy  is appreciated.     There are no areas of suspicious enhancement or morphology in the right  breast. I see no evidence for abnormal skin, nipple or chest wall  enhancement of the right breast and there is no evidence for right  axillary or internal mammary chain adenopathy.        IMPRESSION:  1. Biopsy-proven malignancy in the left breast in the posterior one  third at the 3 o'clock position that measures on the order of 2.7 cm in  greatest dimension. No other suspicious findings are seen within the  left breast. A left axillary lymph node that has undergone prior biopsy  and shows diffuse cortical thickening is noted and is suspicious for  left axillary adenopathy are noted.  2. There are no findings suspicious for malignancy in the right breast.    PATHOLOGY:   4/16/24  1.  Left breast, 2:00, ultrasound-biopsies for mass: INVASIVE MAMMARY CARCINOMA, NO SPECIAL TYPE (INVASIVE DUCTAL CARCINOMA).               -Histologic grade: Rosanky is logic score III (tubules = 3, nuclei = 3, mitosis = 2).               -No in situ component identified.               -Invasive carcinoma involves multiple tissue cores, measuring up to 14 mm maximally.               -No lymphovascular nor perineural invasion identified.     2. Lymph node, left axilla, core biopsies:               -Free floating fragment of carcinoma, suspicious for metastasis (see Comment).    Estrogen Receptor (ER) Status  Positive (greater than 10% of cells demonstrate nuclear positivity)   Percentage of Cells with Nuclear Positivity  %   Average Intensity of Staining  Strong   Test Type  Food and Drug Administration (FDA)  cleared (test / vendor): ventana   Primary Antibody  SP1   Scoring System  Kathy   Proportion Score  5   Intensity Score  3   Total Kathy Score  8   Test(s) Performed     Progesterone Receptor (PgR) Status  Positive   Percentage of Cells with Nuclear Positivity  31-40%   Average Intensity of Staining  Moderate   Test Type  Food and Drug Administration (FDA) cleared (test / vendor): ventana   Primary Antibody  1E2   Scoring System  Kathy   Proportion Score  4   Intensity Score  2   Total Kathy Score  6   Test(s) Performed     HER2 by Immunohistochemistry  Negative (Score 1+)   Test Type  Food and Drug Administration (FDA) cleared (test / vendor): [employer]   Primary Antibody  4B5   Test(s) Performed  Ki-67   Ki-67 Percentage of Positive Nuclei  95 %         Assessment & Plan   Assessment:  58 y.o. F with a new diagnosis of left breast: Invasive ductal carcinoma grade III, ER/MT +, Her2 -; T1cN1/0M0, anatomic stage IIB, prognostic stage IIB.      Discussion:  I had an extensive discussion with the patient and her family about the nature of her breast cancer diagnosis. We reviewed the components of breast tissue including ducts and lobules. We reviewed her pathology report in detail. We reviewed breast cancer histology, including stage, grade, ER/MT receptors, HER2 receptors and how this applies to her diagnosis. We reviewed the basics of systemic and local/regional management of breast cancer.      We discussed that in her case, systemic treatment would involve endocrine therapy and possibly chemotherapy. She will be referred to medical oncology postoperatively to discuss this further.     We reviewed potential surgical treatments to include partial mastectomy, mastectomy, sentinel lymph node biopsy and axillary node dissection and discussed the rationale associated with each approach. Regarding radiation therapy, we discussed that radiation is indicated in all cases of breast conservation and in only limited  circumstances following mastectomy. Partial mastectomy with radiation and mastectomy were explained with option of reconstruction.  The patient was informed that from a survival standpoint, both procedures are oncologically equivalent.   We discussed that the primary goal of adjuvant radiation is to decrease the likelihood of local recurrence.       With further imaging and delineation of disease she was found to have a T2 breast lesion and axillary radha involvement.  As she now has stage IIb breast cancer standard of care would require staging scans be completed.  Pending these results she remains a partial mastectomy versus mastectomy candidate.  She remains anxious about starting chemotherapy.  As she has palpable N1 disease standard of care would require axillary dissection at the time of surgery.  Discussed possible downstaging of her axilla with neoadjuvant chemotherapy should her staging scans show no metastatic disease.  Discussed all algorithm of options including surgery first versus neoadjuvant therapy.  Possible role in treatment for metastatic disease should that be identified on upcoming imaging.      I explained that most breast cancer is not hereditary, that I do not believe this plays a role in her case, and that she does not meet NCCN criteria for genetic testing. I would not recommend a bilateral mastectomy, since her risk of a second primary cancer is relatively low and endocrine therapy will further reduce her risk.  She is very concerned about her children's potential risk for breast cancer.  Discussed low likelihood of a genetic cause of her breast cancer.  And again reiterated per NCCN guidelines she does not meet criteria for genetic testing.    I described additional risks and potential complications associated with surgery, including, but not limited to, bleeding, infection, deformity/poor cosmetic result, chronic pain, numbness, seroma, hematoma, deep venous thrombosis, skin flap  necrosis, disease recurrence and the possibility of requiring additional surgery. We also discussed other treatment options including the option of not undergoing any surgical treatment with a palliative rather than curative intent and the risks associated with this including disease progression. She expressed an understanding of these factors and wished to proceed.      Plan:  A multidisciplinary plan has been formulated for the patient:      # Breast Surgical Oncology:  -If no metastatic lesions found on staging scans would be candidate for mastectomy versus partial mastectomy and axillary node dissection with surgery first treatment.  - with palpable lymph node she would require dissection at time of surgery  - clinical exam with palpable axillary lesion and breast masses, stage IIB breast cancer, complete preop staging scans to ensure to metastatic disease.  - Follow-up after staging scans completed    # Medical Oncology:  - T2N1 disease, follow up staging scans.  - Will discuss at tumor board possible role of neoadjuvant chemotherapy should her staging scans come back negative.  - depending on extent of radha burden of disease may be an oncotype candidate    #  Radiation Oncology:  -Will refer postoperatively.    # Nurse Navigation  - Referral made today, very concerned about risk of side effects from treatment.   - not established with her PCP, and needs to be re-established   - family lives out of town, children with significant mental health concerns causing additional stress on top of recent cancer diagnoses.     # Lymphedema clinic  - Referral was placed previously    # Breast Imaging  - Next Screening mammogram due: 4/2025  -Staging scans with CT chest, abdomen, pelvis.  Bone scan ordered today    #General Medical work up  -Last PCP visit was in 2022, recommended medication regimen with metformin and insulin.  Will need to obtain preoperative evaluation of A1c to better delineate risk of wound healing  "problems.  Plan to complete preop EKG as well.  - looking for new PCP, discussed [facility] PCP within [facility] if interested.   - Discussed role of follow up colonoscopy and she plans to follow-up on her own, does not require referral.  - after clinical visit - additional chart review noted additional problems listed from stephanie including abnormal cologuard in the past, in addition to her report of \"abnormal colonoscopy\"      Moira Mccain MD  Breast Surgical Oncology    I spent 30 minutes caring for Ms Man on this date of service. This time includes time spent by me in the following activities: preparing for the visit, reviewing tests, obtaining and/or reviewing a separately obtained history, performing a medically appropriate examination and/or evaluation , counseling and educating the patient/family/caregiver, referring and communicating with other health care professionals , documenting information in the medical record, independently interpreting results and communicating that information with the patient/family/caregiver, and care coordination.      CC:  Nely Jesus  "

## 2024-05-15 NOTE — LETTER
May 15, 2024       No Recipients    Patient: Sd Man   YOB: 1965   Date of Visit: 5/15/2024     Dear Nely Jesus MD:       Thank you for referring Sd Man to me for evaluation. Below are the relevant portions of my assessment and plan of care.    If you have questions, please do not hesitate to call me. I look forward to following Sd along with you.         Sincerely,        Moira Mccain MD        CC:   No Recipients    Moira Mccain MD  05/15/24 6949  Sign when Signing Visit  [facility]     Referring Provider: Pau Jesus MD     Chief complaint: breast mass and newly diagnosed breast cancer    Subjective   HPI: Ms. Sd Man is a 58 y.o. yo woman, seen at the request of Nely Jesus (OBGYN) for a new diagnosis of left breast cancer.      She noted an area of pain in her left breast that started several months ago.  She noted a palpable mass approximately 6 weeks ago.  She thought it might be due to increased size of the lymph node after a period of a scratchy throat.  She then presented to her OB/GYN who ordered diagnostic imaging and further workup.  She has not had recent imaging of her breast for screening.    She denies any prior history of abnormal mammograms or breast biopsies.     She reports she has a pertinent PMH of PCOS.  She has had significant stressors due to job changes and family health concerns including stressful care of her children over the past several years.  She underwent a screening colonoscopy and was found to have a suspicious polyp and close follow-up was recommended.  She has not been able to return for follow-up colonoscopy and is concerned there could be an underlying colon malignancy.  She also went through a complicated divorce before [facility].    She was joined today in clinic by her friend. They all participated in the discussion, after her examination, and she gave her consent for them to participate.    HPI 5/15/24  She completed  breast MRI.  She notes the lesion in her left lateral breast feels subjectively larger.  The area continues to be painful and heavy feeling.  Denies any new symptoms.    Oncology/Hematology History   Malignant neoplasm of upper-outer quadrant of left breast in female, estrogen receptor positive   4/16/2024 Cancer Staged    Staging form: Breast, AJCC 8th Edition  - Clinical stage from 4/16/2024: Stage IIB (cT2, cN1(f), cM0, G3, ER+, CA+, HER2-) - Signed by Moira Mccain MD on 5/15/2024     4/30/2024 Initial Diagnosis    Malignant neoplasm of upper-outer quadrant of left breast in female, estrogen receptor positive         Review of Systems   Constitutional:  Negative for appetite change, fatigue and fever.   Respiratory:  Negative for cough and shortness of breath.    Gastrointestinal:  Negative for abdominal distention, diarrhea and nausea.   Musculoskeletal:  Negative for arthralgias and back pain.   Neurological:  Negative for dizziness, headaches and speech difficulty.   Psychiatric/Behavioral:  Positive for depression. Negative for decreased concentration and sleep disturbance. The patient is nervous/anxious.        Medications:    Current Outpatient Medications:   •  amphetamine-dextroamphetamine (ADDERALL) 12.5 MG tablet, TAKE 1 TABLET BY MOUTH TWICE DAILY IN THE PM, Disp: , Rfl:   •  azSTARys 39.2-7.8 MG capsule, , Disp: , Rfl:   •  Trintellix 10 MG tablet tablet, , Disp: , Rfl:     Allergies:  No Known Allergies    Medical history:  Past Medical History:   Diagnosis Date   • ADHD    • Depression    • Obesity 11/11/2012   • PCOS (polycystic ovarian syndrome)    • PTSD (post-traumatic stress disorder)        Surgical History:  Past Surgical History:   Procedure Laterality Date   • OOPHORECTOMY Bilateral 01/01/2014       Family History:  Family History   Problem Relation Age of Onset   • Melanoma Mother 75        alive currently 79       Family Cancer History: relationship - (age of diagnosis/current age or age  at death)  Breast: Denies  Ovarian: Denies  Colon: Denies  Pancreas: Denies  Prostate: Denies    Social History:   Social History     Socioeconomic History   • Marital status:    Tobacco Use   • Smoking status: Never     Passive exposure: Never   • Smokeless tobacco: Never   Vaping Use   • Vaping status: Never Used   Substance and Sexual Activity   • Alcohol use: Never   • Drug use: Never   • Sexual activity: Not Currently       She lives by herself  She works as a , at [employer]         GYNECOLOGIC HISTORY:   G: 3. P: 3. AB: 0.  Last menstrual period: 47  Age at menarche: 14  Age at first childbirth: 32  Lactation/How lon years  Age at menopause: 47  Total years of oral contraceptive use: None  Total years of hormone replacement therapy: Several years      Objective  PHYSICAL EXAMINATION:   Vitals:    05/15/24 1359   BP: 138/76   Pulse: 107   SpO2: 97%       Examination chaperoned by Halina.  ECOG 0 - Asymptomatic  General: NAD, well appearing  Psych: a&o x 3, anxious, tangential conversation  Eyes: EOMI, no scleral icterus  ENMT: neck supple without masses or thyromegaly, mucus membranes moist  Resp: normal effort  CV: RRR, no edema   GI: soft, NT, ND  MSK: normal gait, normal ROM in bilateral shoulders  Lymph nodes:  no cervical, supraclavicular or axillary lymphadenopathy, left axilla with palpable area in region of previous biopsy  Breast: symmetric, bra 42C, broad base of the breast  Right:  No visible abnormalities on inspection while seated, with arms raised or hands on hips. No masses, skin changes, or nipple abnormalities.  Left:  No visible abnormalities on inspection while seated. No skin changes, or nipple abnormalities.  Palpable 2 cm oval-shaped abnormality in left upper outer quadrant posterior.     Previous IMAGING: I have independently reviewed her imagin2024 bilateral diagnostic mammogram  The breasts are heterogeneously dense which may obscure small masses.  Finding  1: There is a high density irregular mass measuring 20 mm with indistinct margins seen in the posterior one third region left breast 2:00 11 cm from nipple.  Patient indicates a palpable lump or thickening in the left breast.  The symptomatic area is clearly marked.  In the right breast no suspicious masses significant calcifications or other abnormalities are seen.  Left breast ultrasound  Finding 1: Ultrasound demonstrates an irregular solid mass with indistinct margins measuring 20 x 17 x 20 mm seen in the posterior one third region of the left breast 2:00 11 cm from the nipple.  This correlates to the palpable area.  Finding 2: There is a lymph node measuring 20 x 9 x 13 mm seen in the left breast the cortex is prominent measuring up to 4 mm in thickness  Impression:  Finding 1: Solid mass in the left breast is suspicious.  Ultrasound-guided vacuum-assisted biopsy is recommended.  Finding 2: Lymph node in the left breast is suspicious FNA possible spring-loaded or limited core biopsy is recommended.  BI-RADS 4C    4/16/2024 left ultrasound-guided biopsy  Patient's left breast in the axillary position was imaged and the abnormal lymph node was localized.  Using a 12-gauge biopsy device 5 cores were obtained.  A HydroMARK tissue marker was placed at the biopsy site.  2 view postprocedure mammogram and ultrasound confirmed the clip in the expected location.  Pathology returned as malignant and concordant.  Report describes a free-floating fragment of carcinoma, suspicious for metastasis.      5/10/24 Breast MRI  FINDINGS: Scattered fibroglandular tissue is seen throughout both  breast. Mild background parenchymal enhancement of both breasts is  noted.     In the posterior one third lateral aspect of the left breast at the 3  o'clock position on the order of 12 cm from the nipple. There is an  irregular enhancing mass that measures 2.6 cm in anterior to posterior  dimension, 2.4 cm in the superior to inferior  dimension and 2.7 cm in  the mediolateral dimension. A focal signal void is seen centrally within  the mass. This represents the biopsy-proven site of malignancy with the  internal mini cork shaped metallic clip.     No other areas of suspicious enhancement or morphology are seen in the  left breast. I see no evidence for abnormal skin, nipple or chest wall  enhancement of the left breast. There is an inferior left axillary lymph  node that measures on the order of 2 cm in greatest dimension and shows  diffuse cortical thickening with the cortex measuring up to 0.8 cm in  thickness. Mild surrounding edema is noted. This is in the expected  location of the biopsied lymph node with the associated coil-shaped  HydroMARK metallic clip at the inferior margin seen on the mammogram  dated 04/16/2024. No evidence for left internal mammary chain adenopathy  is appreciated.     There are no areas of suspicious enhancement or morphology in the right  breast. I see no evidence for abnormal skin, nipple or chest wall  enhancement of the right breast and there is no evidence for right  axillary or internal mammary chain adenopathy.        IMPRESSION:  1. Biopsy-proven malignancy in the left breast in the posterior one  third at the 3 o'clock position that measures on the order of 2.7 cm in  greatest dimension. No other suspicious findings are seen within the  left breast. A left axillary lymph node that has undergone prior biopsy  and shows diffuse cortical thickening is noted and is suspicious for  left axillary adenopathy are noted.  2. There are no findings suspicious for malignancy in the right breast.    PATHOLOGY:   4/16/24  1.  Left breast, 2:00, ultrasound-biopsies for mass: INVASIVE MAMMARY CARCINOMA, NO SPECIAL TYPE (INVASIVE DUCTAL CARCINOMA).               -Histologic grade: Abhi is logic score III (tubules = 3, nuclei = 3, mitosis = 2).               -No in situ component identified.               -Invasive  carcinoma involves multiple tissue cores, measuring up to 14 mm maximally.               -No lymphovascular nor perineural invasion identified.     2. Lymph node, left axilla, core biopsies:               -Free floating fragment of carcinoma, suspicious for metastasis (see Comment).    Estrogen Receptor (ER) Status  Positive (greater than 10% of cells demonstrate nuclear positivity)   Percentage of Cells with Nuclear Positivity  %   Average Intensity of Staining  Strong   Test Type  Food and Drug Administration (FDA) cleared (test / vendor): ventana   Primary Antibody  SP1   Scoring System  Kathy   Proportion Score  5   Intensity Score  3   Total Kathy Score  8   Test(s) Performed     Progesterone Receptor (PgR) Status  Positive   Percentage of Cells with Nuclear Positivity  31-40%   Average Intensity of Staining  Moderate   Test Type  Food and Drug Administration (FDA) cleared (test / vendor): ventana   Primary Antibody  1E2   Scoring System  Kathy   Proportion Score  4   Intensity Score  2   Total Kathy Score  6   Test(s) Performed     HER2 by Immunohistochemistry  Negative (Score 1+)   Test Type  Food and Drug Administration (FDA) cleared (test / vendor): ventana   Primary Antibody  4B5   Test(s) Performed  Ki-67   Ki-67 Percentage of Positive Nuclei  95 %         Assessment & Plan  Assessment:  58 y.o. F with a new diagnosis of left breast: Invasive ductal carcinoma grade III, ER/AR +, Her2 -; T1cN1/0M0, anatomic stage IIB, prognostic stage IIB.      Discussion:  I had an extensive discussion with the patient and her family about the nature of her breast cancer diagnosis. We reviewed the components of breast tissue including ducts and lobules. We reviewed her pathology report in detail. We reviewed breast cancer histology, including stage, grade, ER/AR receptors, HER2 receptors and how this applies to her diagnosis. We reviewed the basics of systemic and local/regional management of breast cancer.       We discussed that in her case, systemic treatment would involve endocrine therapy and possibly chemotherapy. She will be referred to medical oncology postoperatively to discuss this further.     We reviewed potential surgical treatments to include partial mastectomy, mastectomy, sentinel lymph node biopsy and axillary node dissection and discussed the rationale associated with each approach. Regarding radiation therapy, we discussed that radiation is indicated in all cases of breast conservation and in only limited circumstances following mastectomy. Partial mastectomy with radiation and mastectomy were explained with option of reconstruction.  The patient was informed that from a survival standpoint, both procedures are oncologically equivalent.   We discussed that the primary goal of adjuvant radiation is to decrease the likelihood of local recurrence.       With further imaging and delineation of disease she was found to have a T2 breast lesion and axillary radha involvement.  As she now has stage IIb breast cancer standard of care would require staging scans be completed.  Pending these results she remains a partial mastectomy versus mastectomy candidate.  She remains anxious about starting chemotherapy.  As she has palpable N1 disease standard of care would require axillary dissection at the time of surgery.  Discussed possible downstaging of her axilla with neoadjuvant chemotherapy should her staging scans show no metastatic disease.  Discussed all algorithm of options including surgery first versus neoadjuvant therapy.  Possible role in treatment for metastatic disease should that be identified on upcoming imaging.      I explained that most breast cancer is not hereditary, that I do not believe this plays a role in her case, and that she does not meet NCCN criteria for genetic testing. I would not recommend a bilateral mastectomy, since her risk of a second primary cancer is relatively low and endocrine  therapy will further reduce her risk.  She is very concerned about her children's potential risk for breast cancer.  Discussed low likelihood of a genetic cause of her breast cancer.  And again reiterated per NCCN guidelines she does not meet criteria for genetic testing.    I described additional risks and potential complications associated with surgery, including, but not limited to, bleeding, infection, deformity/poor cosmetic result, chronic pain, numbness, seroma, hematoma, deep venous thrombosis, skin flap necrosis, disease recurrence and the possibility of requiring additional surgery. We also discussed other treatment options including the option of not undergoing any surgical treatment with a palliative rather than curative intent and the risks associated with this including disease progression. She expressed an understanding of these factors and wished to proceed.      Plan:  A multidisciplinary plan has been formulated for the patient:      # Breast Surgical Oncology:  -If no metastatic lesions found on staging scans would be candidate for mastectomy versus partial mastectomy and axillary node dissection with surgery first treatment.  - with palpable lymph node she would require dissection at time of surgery  - clinical exam with palpable axillary lesion and breast masses, stage IIB breast cancer, complete preop staging scans to ensure to metastatic disease.    # Medical Oncology:  - T2N1 disease, follow up staging scans.  - Will discuss at tumor board possible role of neoadjuvant chemotherapy should her staging scans come back negative.  - depending on extent of radha burden of disease may be an oncotype candidate    #  Radiation Oncology:  -Will refer postoperatively.    # Nurse Navigation  - Referral made today, very concerned about risk of side effects from treatment.   - not established with her PCP, and needs to be re-established   - family lives out of town, children with significant mental health  "concerns causing additional stress on top of recent cancer diagnoses.     # Lymphedema clinic  - Referral was placed previously    # Breast Imaging  - Next Screening mammogram due: 4/2025  -Staging scans with CT chest, abdomen, pelvis.  Bone scan ordered today    #General Medical work up  -Last PCP visit was in 2022, recommended medication regimen with metformin and insulin.  Will need to obtain preoperative evaluation of A1c to better delineate risk of wound healing problems.  Plan to complete preop EKG as well.  - looking for new PCP, discussed new PCP within [facility] if interested.   - Discussed role of follow up colonoscopy and she plans to follow-up on her own, does not require referral.  - after clinical visit - additional chart review noted additional problems listed from stephanie including abnormal cologuard in the past, in addition to her report of \"abnormal colonoscopy\"      Moira Mccain MD  Breast Surgical Oncology    I spent 30 minutes caring for Ms Man on this date of service. This time includes time spent by me in the following activities: preparing for the visit, reviewing tests, obtaining and/or reviewing a separately obtained history, performing a medically appropriate examination and/or evaluation , counseling and educating the patient/family/caregiver, referring and communicating with other health care professionals , documenting information in the medical record, independently interpreting results and communicating that information with the patient/family/caregiver, and care coordination.      CC:  Nely Jesus  "

## 2024-05-16 ENCOUNTER — TELEPHONE (OUTPATIENT)
Dept: SURGERY | Facility: CLINIC | Age: 59
End: 2024-05-16
Payer: COMMERCIAL

## 2024-05-16 NOTE — TELEPHONE ENCOUNTER
Spoke to pt and she confirmed new imaigng appt and moved her appt with Dr Mccain to 05/30 instead on 06/7

## 2024-05-23 ENCOUNTER — HOSPITAL ENCOUNTER (OUTPATIENT)
Dept: CT IMAGING | Facility: HOSPITAL | Age: 59
Discharge: HOME | End: 2024-05-23
Admitting: STUDENT IN AN ORGANIZED HEALTH CARE EDUCATION/TRAINING PROGRAM
Payer: COMMERCIAL

## 2024-05-23 ENCOUNTER — HOSPITAL ENCOUNTER (OUTPATIENT)
Dept: NUCLEAR MEDICINE | Facility: HOSPITAL | Age: 59
Discharge: HOME | End: 2024-05-23
Payer: COMMERCIAL

## 2024-05-23 DIAGNOSIS — Z17.0 MALIGNANT NEOPLASM OF UPPER-OUTER QUADRANT OF LEFT BREAST IN FEMALE, ESTROGEN RECEPTOR POSITIVE: ICD-10-CM

## 2024-05-23 DIAGNOSIS — C50.412 MALIGNANT NEOPLASM OF UPPER-OUTER QUADRANT OF LEFT BREAST IN FEMALE, ESTROGEN RECEPTOR POSITIVE: ICD-10-CM

## 2024-05-23 LAB — CREAT BLDA-MCNC: 0.8 MG/DL (ref 0.6–1.3)

## 2024-05-23 PROCEDURE — 0 DIATRIZOATE MEGLUMINE & SODIUM PER 1 ML: Performed by: STUDENT IN AN ORGANIZED HEALTH CARE EDUCATION/TRAINING PROGRAM

## 2024-05-23 PROCEDURE — 25510000001 IOPAMIDOL 61 % SOLUTION: Performed by: STUDENT IN AN ORGANIZED HEALTH CARE EDUCATION/TRAINING PROGRAM

## 2024-05-23 PROCEDURE — A9503 TC99M MEDRONATE: HCPCS | Performed by: STUDENT IN AN ORGANIZED HEALTH CARE EDUCATION/TRAINING PROGRAM

## 2024-05-23 PROCEDURE — 78306 BONE IMAGING WHOLE BODY: CPT

## 2024-05-23 PROCEDURE — 82565 ASSAY OF CREATININE: CPT

## 2024-05-23 PROCEDURE — 74177 CT ABD & PELVIS W/CONTRAST: CPT

## 2024-05-23 PROCEDURE — 71260 CT THORAX DX C+: CPT

## 2024-05-23 PROCEDURE — 0 TECHNETIUM MEDRONATE KIT: Performed by: STUDENT IN AN ORGANIZED HEALTH CARE EDUCATION/TRAINING PROGRAM

## 2024-05-23 RX ORDER — TC 99M MEDRONATE 20 MG/10ML
19 INJECTION, POWDER, LYOPHILIZED, FOR SOLUTION INTRAVENOUS
Status: COMPLETED | OUTPATIENT
Start: 2024-05-23 | End: 2024-05-23

## 2024-05-23 RX ADMIN — Medication 19 MILLICURIE: at 09:40

## 2024-05-23 RX ADMIN — IOPAMIDOL 85 ML: 612 INJECTION, SOLUTION INTRAVENOUS at 11:00

## 2024-05-23 RX ADMIN — DIATRIZOATE MEGLUMINE AND DIATRIZOATE SODIUM 30 ML: 660; 100 LIQUID ORAL; RECTAL at 11:07

## 2024-05-29 ENCOUNTER — OFFICE VISIT (OUTPATIENT)
Dept: SURGERY | Facility: CLINIC | Age: 59
End: 2024-05-29
Payer: COMMERCIAL

## 2024-05-29 VITALS
HEIGHT: 64 IN | SYSTOLIC BLOOD PRESSURE: 142 MMHG | DIASTOLIC BLOOD PRESSURE: 94 MMHG | RESPIRATION RATE: 18 BRPM | WEIGHT: 242 LBS | HEART RATE: 113 BPM | BODY MASS INDEX: 41.32 KG/M2 | OXYGEN SATURATION: 96 %

## 2024-05-29 DIAGNOSIS — C50.412 MALIGNANT NEOPLASM OF UPPER-OUTER QUADRANT OF LEFT BREAST IN FEMALE, ESTROGEN RECEPTOR POSITIVE: Primary | ICD-10-CM

## 2024-05-29 DIAGNOSIS — Z17.0 MALIGNANT NEOPLASM OF UPPER-OUTER QUADRANT OF LEFT BREAST IN FEMALE, ESTROGEN RECEPTOR POSITIVE: Primary | ICD-10-CM

## 2024-05-29 DIAGNOSIS — E88.819 INSULIN RESISTANCE: ICD-10-CM

## 2024-05-29 NOTE — LETTER
May 30, 2024       No Recipients    Patient: Sd Man   YOB: 1965   Date of Visit: 5/29/2024     Dear Ghazala Nieves MD:       Thank you for referring Sd Man to me for evaluation. Below are the relevant portions of my assessment and plan of care.    If you have questions, please do not hesitate to call me. I look forward to following Sd along with you.         Sincerely,        Moira Mccain MD        CC:   No Recipients    Moira Mccain MD  05/30/24 1720  Sign when Signing Visit  [facility]     Referring Provider: No ref. provider found     Chief complaint: breast mass and newly diagnosed breast cancer    Subjective   HPI: Ms. Sd Man is a 59 y.o. yo woman, seen at the request of Nely Jesus (OBGYN) for a new diagnosis of left breast cancer.      She noted an area of pain in her left breast that started several months ago.  She noted a palpable mass approximately 6 weeks ago.  She thought it might be due to increased size of the lymph node after a period of a scratchy throat.  She then presented to her OB/GYN who ordered diagnostic imaging and further workup.  She has not had recent imaging of her breast for screening.    She denies any prior history of abnormal mammograms or breast biopsies.     She reports she has a pertinent PMH of PCOS.  She has had significant stressors due to job changes and family health concerns including stressful care of her children over the past several years.  She underwent a screening colonoscopy and was found to have a suspicious polyp and close follow-up was recommended.  She has not been able to return for follow-up colonoscopy and is concerned there could be an underlying colon malignancy.  She also went through a complicated divorce before [facility].    She was joined today in clinic by her friend. They all participated in the discussion, after her examination, and she gave her consent for them to participate.    HPI 5/15/24  She completed  breast MRI.  She notes the lesion in her left lateral breast feels subjectively larger.  The area continues to be painful and heavy feeling.  Denies any new symptoms.    HPI 5/29/24  Completed staging scans. No concerns for metastatic disease. US recommended for liver cyst.   She remains anxious about all possible therapies - side effects from radiation and chemotherapy. She is interested in discussion with behavioral oncology and medical oncology to discuss expected side effects and course of therapy.       Oncology/Hematology History   Malignant neoplasm of upper-outer quadrant of left breast in female, estrogen receptor positive   4/16/2024 Cancer Staged    Staging form: Breast, AJCC 8th Edition  - Clinical stage from 4/16/2024: Stage IIB (cT2, cN1(f), cM0, G3, ER+, OR+, HER2-) - Signed by Moira Mccain MD on 5/15/2024     4/30/2024 Initial Diagnosis    Malignant neoplasm of upper-outer quadrant of left breast in female, estrogen receptor positive         Review of Systems   Constitutional:  Negative for appetite change, fatigue and fever.   Respiratory:  Negative for cough and shortness of breath.    Gastrointestinal:  Negative for abdominal distention, diarrhea and nausea.   Musculoskeletal:  Negative for arthralgias and back pain.   Neurological:  Negative for dizziness, headaches and speech difficulty.   Psychiatric/Behavioral:  Positive for depression. Negative for decreased concentration and sleep disturbance. The patient is nervous/anxious.        Medications:    Current Outpatient Medications:   •  amphetamine-dextroamphetamine (ADDERALL) 12.5 MG tablet, TAKE 1 TABLET BY MOUTH TWICE DAILY IN THE PM, Disp: , Rfl:   •  azSTARys 39.2-7.8 MG capsule, , Disp: , Rfl:   •  Trintellix 10 MG tablet tablet, , Disp: , Rfl:     Allergies:  No Known Allergies    Medical history:  Past Medical History:   Diagnosis Date   • ADHD    • Depression    • Obesity 11/11/2012   • PCOS (polycystic ovarian syndrome)    • PTSD  (post-traumatic stress disorder)        Surgical History:  Past Surgical History:   Procedure Laterality Date   • OOPHORECTOMY Bilateral 2014       Family History:  Family History   Problem Relation Age of Onset   • Melanoma Mother 75        alive currently 79       Family Cancer History: relationship - (age of diagnosis/current age or age at death)  Breast: Denies  Ovarian: Denies  Colon: Denies  Pancreas: Denies  Prostate: Denies    Social History:   Social History     Socioeconomic History   • Marital status:    Tobacco Use   • Smoking status: Never     Passive exposure: Never   • Smokeless tobacco: Never   Vaping Use   • Vaping status: Never Used   Substance and Sexual Activity   • Alcohol use: Never   • Drug use: Never   • Sexual activity: Not Currently       She lives by herself  She works as a , at [employer]         GYNECOLOGIC HISTORY:   G: 3. P: 3. AB: 0.  Last menstrual period: 47  Age at menarche: 14  Age at first childbirth: 32  Lactation/How lon years  Age at menopause: 47  Total years of oral contraceptive use: None  Total years of hormone replacement therapy: Several years      Objective  PHYSICAL EXAMINATION:   Vitals:    24 0937   BP: 142/94   Pulse: 113   Resp: 18   SpO2: 96%         Examination chaperoned by Halina.  ECOG 0 - Asymptomatic  General: NAD, well appearing  Psych: a&o x 3, anxious, tangential conversation  Eyes: EOMI, no scleral icterus  ENMT: neck supple without masses or thyromegaly, mucus membranes moist  Resp: normal effort  CV: RRR, no edema   GI: soft, NT, ND  MSK: normal gait, normal ROM in bilateral shoulders  Lymph nodes:  no cervical, supraclavicular lymphadenopathy, left axilla with palpable mobile axillary lymphadenopathy  Breast: symmetric, bra 42C, broad base of the breast  Right:  No visible abnormalities on inspection while seated, with arms raised or hands on hips. No masses, skin changes, or nipple abnormalities.  Left:  No visible  abnormalities on inspection while seated. No skin changes, or nipple abnormalities.  Palpable 2 cm oval-shaped abnormality in left upper outer quadrant posterior. oblong  and longer in radial dimension.     Previous IMAGING: I have independently reviewed her imagin2024 bilateral diagnostic mammogram  The breasts are heterogeneously dense which may obscure small masses.  Finding 1: There is a high density irregular mass measuring 20 mm with indistinct margins seen in the posterior one third region left breast 2:00 11 cm from nipple.  Patient indicates a palpable lump or thickening in the left breast.  The symptomatic area is clearly marked.  In the right breast no suspicious masses significant calcifications or other abnormalities are seen.  Left breast ultrasound  Finding 1: Ultrasound demonstrates an irregular solid mass with indistinct margins measuring 20 x 17 x 20 mm seen in the posterior one third region of the left breast 2:00 11 cm from the nipple.  This correlates to the palpable area.  Finding 2: There is a lymph node measuring 20 x 9 x 13 mm seen in the left breast the cortex is prominent measuring up to 4 mm in thickness  Impression:  Finding 1: Solid mass in the left breast is suspicious.  Ultrasound-guided vacuum-assisted biopsy is recommended.  Finding 2: Lymph node in the left breast is suspicious FNA possible spring-loaded or limited core biopsy is recommended.  BI-RADS 4C    2024 left ultrasound-guided biopsy  Patient's left breast in the axillary position was imaged and the abnormal lymph node was localized.  Using a 12-gauge biopsy device 5 cores were obtained.  A HydroMARK tissue marker was placed at the biopsy site.  2 view postprocedure mammogram and ultrasound confirmed the clip in the expected location.  Pathology returned as malignant and concordant.  Report describes a free-floating fragment of carcinoma, suspicious for metastasis.      5/10/24 Breast MRI  FINDINGS: Scattered  fibroglandular tissue is seen throughout both  breast. Mild background parenchymal enhancement of both breasts is  noted.     In the posterior one third lateral aspect of the left breast at the 3  o'clock position on the order of 12 cm from the nipple. There is an  irregular enhancing mass that measures 2.6 cm in anterior to posterior  dimension, 2.4 cm in the superior to inferior dimension and 2.7 cm in  the mediolateral dimension. A focal signal void is seen centrally within  the mass. This represents the biopsy-proven site of malignancy with the  internal mini cork shaped metallic clip.     No other areas of suspicious enhancement or morphology are seen in the  left breast. I see no evidence for abnormal skin, nipple or chest wall  enhancement of the left breast. There is an inferior left axillary lymph  node that measures on the order of 2 cm in greatest dimension and shows  diffuse cortical thickening with the cortex measuring up to 0.8 cm in  thickness. Mild surrounding edema is noted. This is in the expected  location of the biopsied lymph node with the associated coil-shaped  HydroMARK metallic clip at the inferior margin seen on the mammogram  dated 04/16/2024. No evidence for left internal mammary chain adenopathy  is appreciated.     There are no areas of suspicious enhancement or morphology in the right  breast. I see no evidence for abnormal skin, nipple or chest wall  enhancement of the right breast and there is no evidence for right  axillary or internal mammary chain adenopathy.        IMPRESSION:  1. Biopsy-proven malignancy in the left breast in the posterior one  third at the 3 o'clock position that measures on the order of 2.7 cm in  greatest dimension. No other suspicious findings are seen within the  left breast. A left axillary lymph node that has undergone prior biopsy  and shows diffuse cortical thickening is noted and is suspicious for  left axillary adenopathy are noted.  2. There are no  findings suspicious for malignancy in the right breast.    PATHOLOGY:   4/16/24  1.  Left breast, 2:00, ultrasound-biopsies for mass: INVASIVE MAMMARY CARCINOMA, NO SPECIAL TYPE (INVASIVE DUCTAL CARCINOMA).               -Histologic grade: [place] is logic score III (tubules = 3, nuclei = 3, mitosis = 2).               -No in situ component identified.               -Invasive carcinoma involves multiple tissue cores, measuring up to 14 mm maximally.               -No lymphovascular nor perineural invasion identified.     2. Lymph node, left axilla, core biopsies:               -Free floating fragment of carcinoma, suspicious for metastasis (see Comment).    Estrogen Receptor (ER) Status  Positive (greater than 10% of cells demonstrate nuclear positivity)   Percentage of Cells with Nuclear Positivity  %   Average Intensity of Staining  Strong   Test Type  Food and Drug Administration (FDA) cleared (test / vendor): ventana   Primary Antibody  SP1   Scoring System  Kathy   Proportion Score  5   Intensity Score  3   Total Kathy Score  8   Test(s) Performed     Progesterone Receptor (PgR) Status  Positive   Percentage of Cells with Nuclear Positivity  31-40%   Average Intensity of Staining  Moderate   Test Type  Food and Drug Administration (FDA) cleared (test / vendor): ventana   Primary Antibody  1E2   Scoring System  Kathy   Proportion Score  4   Intensity Score  2   Total Kathy Score  6   Test(s) Performed     HER2 by Immunohistochemistry  Negative (Score 1+)   Test Type  Food and Drug Administration (FDA) cleared (test / vendor): ventana   Primary Antibody  4B5   Test(s) Performed  Ki-67   Ki-67 Percentage of Positive Nuclei  95 %         Assessment & Plan  Assessment:  59 y.o. F with a new diagnosis of left breast: Invasive ductal carcinoma grade III, ER/OH +, Her2 -; T2N1M0, anatomic stage IIB, prognostic stage IIB.      Discussion:  I had an extensive discussion with the patient and her family about  the nature of her breast cancer diagnosis. We reviewed the components of breast tissue including ducts and lobules. We reviewed her pathology report in detail. We reviewed breast cancer histology, including stage, grade, ER/LA receptors, HER2 receptors and how this applies to her diagnosis. We reviewed the basics of systemic and local/regional management of breast cancer.      We discussed that in her case, systemic treatment would involve endocrine therapy and possibly chemotherapy. She will be referred to medical oncology postoperatively to discuss this further.     We reviewed potential surgical treatments to include partial mastectomy, mastectomy, sentinel lymph node biopsy and axillary node dissection and discussed the rationale associated with each approach. Regarding radiation therapy, we discussed that radiation is indicated in all cases of breast conservation and in only limited circumstances following mastectomy. Partial mastectomy with radiation and mastectomy were explained with option of reconstruction.  The patient was informed that from a survival standpoint, both procedures are oncologically equivalent.   We discussed that the primary goal of adjuvant radiation is to decrease the likelihood of local recurrence.       With further imaging and delineation of disease she was found to have a T2 breast lesion and axillary radha involvement.  As she now has stage IIb breast cancer standard of care would require staging scans be completed.  Pending these results she remains a partial mastectomy versus mastectomy candidate.  She remains anxious about starting chemotherapy.  As she has palpable N1 disease standard of care would require axillary dissection at the time of surgery.  Discussed possible downstaging of her axilla with neoadjuvant chemotherapy should her staging scans show no metastatic disease.  Discussed all algorithm of options including surgery first versus neoadjuvant therapy.  Possible role in  treatment for metastatic disease should that be identified on upcoming imaging.      I explained that most breast cancer is not hereditary, that I do not believe this plays a role in her case, and that she does not meet NCCN criteria for genetic testing. I would not recommend a bilateral mastectomy, since her risk of a second primary cancer is relatively low and endocrine therapy will further reduce her risk.  She is very concerned about her children's potential risk for breast cancer.  Discussed low likelihood of a genetic cause of her breast cancer.  And again reiterated per NCCN guidelines she does not meet criteria for genetic testing.    I described additional risks and potential complications associated with surgery, including, but not limited to, bleeding, infection, deformity/poor cosmetic result, chronic pain, numbness, seroma, hematoma, deep venous thrombosis, skin flap necrosis, disease recurrence and the possibility of requiring additional surgery. We also discussed other treatment options including the option of not undergoing any surgical treatment with a palliative rather than curative intent and the risks associated with this including disease progression. She expressed an understanding of these factors and wished to proceed.      Plan:  A multidisciplinary plan has been formulated for the patient:      # Breast Surgical Oncology:  - Remains a candidate for mastectomy versus partial mastectomy and axillary node dissection with surgery first treatment.  If she chose to proceed with neoadjuvant chemotherapy may be able to downstage her axilla and proceed with sentinel lymph node biopsy and possible axillary dissection pending response to therapy.  Had a long discussion about likely recommendation of chemotherapy following surgery.  Additional benefit of neoadjuvant chemotherapy then surgery allows for adjustment of treatment to appropriately respond to remaining burden of disease following standard of  care chemotherapy.  -Consents completed and signed. Discussed the risks and benefits of left partial mastectomy and axillary lymph node dissection at length including estimated time for procedure, postoperative recovery, and postoperative instructions. Discussed the risks to include pain, bleeding, infection, nerve injury, numbness, seroma, skin complications including necrosis, breast asymmetry, need for re-excision, lymphedema, possible need for axillary dissection at time of surgery or at a later date, lymphocele, and scar.  Patient appears to understand and wishes to proceed.  All questions were answered.  -Will follow-up discussion with medical oncology and if chooses to proceed with neoadjuvant therapy will plan to assist with port placement as indicated.  -She is strongly considering temporarily relocating to [place] to be surrounded by her parents for additional support while undergoing cancer care.  - Will continue to obtain all parts of her workup prior to initiating therapy in [place].  Will be available to share all of her documentation and workup should she relocate for care.    # Medical Oncology:  -Remains in Oncotype candidate, sent test on breast biopsy specimen to help guide chemotherapy recommendations including type and duration of therapy.  - Referral now to discuss neoadjuvant chemotherapy.  She has several questions regarding to specifics of duration of therapy and expected side effects.    #  Radiation Oncology:  -Will refer postoperatively.    # Nurse Navigation  - Referral made today, very concerned about risk of side effects from treatment.   - not established with her PCP, and needs to be re-established   - family lives out of town, children with significant mental health concerns causing additional stress on top of recent cancer diagnoses.   -Would benefit from behavioral oncology, discussed additional resources.  She is interested in setting up an appointment with behavioral  oncology.    # Lymphedema clinic  - Referral was placed previously    # Breast Imaging  - Next Screening mammogram due: 4/2025  -Staging scans with CT chest, abdomen, pelvis.  Bone scan ordered today    #General Medical work up  -Last PCP visit was in 2022, recommended medication regimen with metformin and insulin.  Will need to obtain preoperative evaluation of A1c to better delineate risk of wound healing problems.  Plan to complete preop EKG as well.  - looking for new PCP, discussed new PCP within [facility] if interested.   - Discussed role of follow up colonoscopy and she plans to follow-up on her own, does not require referral.      Moira Mccain MD  Breast Surgical Oncology    I spent 60 minutes caring for Ms Man on this date of service. This time includes time spent by me in the following activities: preparing for the visit, reviewing tests, obtaining and/or reviewing a separately obtained history, performing a medically appropriate examination and/or evaluation , counseling and educating the patient/family/caregiver, referring and communicating with other health care professionals , documenting information in the medical record, independently interpreting results and communicating that information with the patient/family/caregiver, and care coordination.      CC:  Nely Jesus-OB/GYN  Dr. Nieves-PCP

## 2024-05-29 NOTE — PROGRESS NOTES
[facility]     Referring Provider: No ref. provider found     Chief complaint: breast mass and newly diagnosed breast cancer    Subjective    HPI: Ms. Sd Man is a 59 y.o. yo woman, seen at the request of Nely Jesus (OBGYN) for a new diagnosis of left breast cancer.      She noted an area of pain in her left breast that started several months ago.  She noted a palpable mass approximately 6 weeks ago.  She thought it might be due to increased size of the lymph node after a period of a scratchy throat.  She then presented to her OB/GYN who ordered diagnostic imaging and further workup.  She has not had recent imaging of her breast for screening.    She denies any prior history of abnormal mammograms or breast biopsies.     She reports she has a pertinent PMH of PCOS.  She has had significant stressors due to job changes and family health concerns including stressful care of her children over the past several years.  She underwent a screening colonoscopy and was found to have a suspicious polyp and close follow-up was recommended.  She has not been able to return for follow-up colonoscopy and is concerned there could be an underlying colon malignancy.  She also went through a complicated divorce before [facility].    She was joined today in clinic by her friend. They all participated in the discussion, after her examination, and she gave her consent for them to participate.    HPI 5/15/24  She completed breast MRI.  She notes the lesion in her left lateral breast feels subjectively larger.  The area continues to be painful and heavy feeling.  Denies any new symptoms.    HPI 5/29/24  Completed staging scans. No concerns for metastatic disease. US recommended for liver cyst.   She remains anxious about all possible therapies - side effects from radiation and chemotherapy. She is interested in discussion with behavioral oncology and medical oncology to discuss expected side effects and course of therapy.        Oncology/Hematology History   Malignant neoplasm of upper-outer quadrant of left breast in female, estrogen receptor positive   4/16/2024 Cancer Staged    Staging form: Breast, AJCC 8th Edition  - Clinical stage from 4/16/2024: Stage IIB (cT2, cN1(f), cM0, G3, ER+, ME+, HER2-) - Signed by Moira Mccain MD on 5/15/2024     4/30/2024 Initial Diagnosis    Malignant neoplasm of upper-outer quadrant of left breast in female, estrogen receptor positive         Review of Systems   Constitutional:  Negative for appetite change, fatigue and fever.   Respiratory:  Negative for cough and shortness of breath.    Gastrointestinal:  Negative for abdominal distention, diarrhea and nausea.   Musculoskeletal:  Negative for arthralgias and back pain.   Neurological:  Negative for dizziness, headaches and speech difficulty.   Psychiatric/Behavioral:  Positive for depression. Negative for decreased concentration and sleep disturbance. The patient is nervous/anxious.        Medications:    Current Outpatient Medications:     amphetamine-dextroamphetamine (ADDERALL) 12.5 MG tablet, TAKE 1 TABLET BY MOUTH TWICE DAILY IN THE PM, Disp: , Rfl:     azSTARys 39.2-7.8 MG capsule, , Disp: , Rfl:     Trintellix 10 MG tablet tablet, , Disp: , Rfl:     Allergies:  No Known Allergies    Medical history:  Past Medical History:   Diagnosis Date    ADHD     Depression     Obesity 11/11/2012    PCOS (polycystic ovarian syndrome)     PTSD (post-traumatic stress disorder)        Surgical History:  Past Surgical History:   Procedure Laterality Date    OOPHORECTOMY Bilateral 01/01/2014       Family History:  Family History   Problem Relation Age of Onset    Melanoma Mother 75        alive currently 79       Family Cancer History: relationship - (age of diagnosis/current age or age at death)  Breast: Denies  Ovarian: Denies  Colon: Denies  Pancreas: Denies  Prostate: Denies    Social History:   Social History     Socioeconomic History    Marital  status:    Tobacco Use    Smoking status: Never     Passive exposure: Never    Smokeless tobacco: Never   Vaping Use    Vaping status: Never Used   Substance and Sexual Activity    Alcohol use: Never    Drug use: Never    Sexual activity: Not Currently       She lives by herself  She works as a , at [employer]         GYNECOLOGIC HISTORY:   G: 3. P: 3. AB: 0.  Last menstrual period: 47  Age at menarche: 14  Age at first childbirth: 32  Lactation/How lon years  Age at menopause: 47  Total years of oral contraceptive use: None  Total years of hormone replacement therapy: Several years      Objective   PHYSICAL EXAMINATION:   Vitals:    24 0937   BP: 142/94   Pulse: 113   Resp: 18   SpO2: 96%         Examination chaperoned by Halina.  ECOG 0 - Asymptomatic  General: NAD, well appearing  Psych: a&o x 3, anxious, tangential conversation  Eyes: EOMI, no scleral icterus  ENMT: neck supple without masses or thyromegaly, mucus membranes moist  Resp: normal effort  CV: RRR, no edema   GI: soft, NT, ND  MSK: normal gait, normal ROM in bilateral shoulders  Lymph nodes:  no cervical, supraclavicular lymphadenopathy, left axilla with palpable mobile axillary lymphadenopathy  Breast: symmetric, bra 42C, broad base of the breast  Right:  No visible abnormalities on inspection while seated, with arms raised or hands on hips. No masses, skin changes, or nipple abnormalities.  Left:  No visible abnormalities on inspection while seated. No skin changes, or nipple abnormalities.  Palpable 2 cm oval-shaped abnormality in left upper outer quadrant posterior. oblong  and longer in radial dimension.     Previous IMAGING: I have independently reviewed her imagin2024 bilateral diagnostic mammogram  The breasts are heterogeneously dense which may obscure small masses.  Finding 1: There is a high density irregular mass measuring 20 mm with indistinct margins seen in the posterior one third region left breast  2:00 11 cm from nipple.  Patient indicates a palpable lump or thickening in the left breast.  The symptomatic area is clearly marked.  In the right breast no suspicious masses significant calcifications or other abnormalities are seen.  Left breast ultrasound  Finding 1: Ultrasound demonstrates an irregular solid mass with indistinct margins measuring 20 x 17 x 20 mm seen in the posterior one third region of the left breast 2:00 11 cm from the nipple.  This correlates to the palpable area.  Finding 2: There is a lymph node measuring 20 x 9 x 13 mm seen in the left breast the cortex is prominent measuring up to 4 mm in thickness  Impression:  Finding 1: Solid mass in the left breast is suspicious.  Ultrasound-guided vacuum-assisted biopsy is recommended.  Finding 2: Lymph node in the left breast is suspicious FNA possible spring-loaded or limited core biopsy is recommended.  BI-RADS 4C    4/16/2024 left ultrasound-guided biopsy  Patient's left breast in the axillary position was imaged and the abnormal lymph node was localized.  Using a 12-gauge biopsy device 5 cores were obtained.  A HydroMARK tissue marker was placed at the biopsy site.  2 view postprocedure mammogram and ultrasound confirmed the clip in the expected location.  Pathology returned as malignant and concordant.  Report describes a free-floating fragment of carcinoma, suspicious for metastasis.      5/10/24 Breast MRI  FINDINGS: Scattered fibroglandular tissue is seen throughout both  breast. Mild background parenchymal enhancement of both breasts is  noted.     In the posterior one third lateral aspect of the left breast at the 3  o'clock position on the order of 12 cm from the nipple. There is an  irregular enhancing mass that measures 2.6 cm in anterior to posterior  dimension, 2.4 cm in the superior to inferior dimension and 2.7 cm in  the mediolateral dimension. A focal signal void is seen centrally within  the mass. This represents the  biopsy-proven site of malignancy with the  internal mini cork shaped metallic clip.     No other areas of suspicious enhancement or morphology are seen in the  left breast. I see no evidence for abnormal skin, nipple or chest wall  enhancement of the left breast. There is an inferior left axillary lymph  node that measures on the order of 2 cm in greatest dimension and shows  diffuse cortical thickening with the cortex measuring up to 0.8 cm in  thickness. Mild surrounding edema is noted. This is in the expected  location of the biopsied lymph node with the associated coil-shaped  HydroMARK metallic clip at the inferior margin seen on the mammogram  dated 04/16/2024. No evidence for left internal mammary chain adenopathy  is appreciated.     There are no areas of suspicious enhancement or morphology in the right  breast. I see no evidence for abnormal skin, nipple or chest wall  enhancement of the right breast and there is no evidence for right  axillary or internal mammary chain adenopathy.        IMPRESSION:  1. Biopsy-proven malignancy in the left breast in the posterior one  third at the 3 o'clock position that measures on the order of 2.7 cm in  greatest dimension. No other suspicious findings are seen within the  left breast. A left axillary lymph node that has undergone prior biopsy  and shows diffuse cortical thickening is noted and is suspicious for  left axillary adenopathy are noted.  2. There are no findings suspicious for malignancy in the right breast.    5/23/24 CT Chest abdomen/pelvis  CLINICAL INFORMATION: Newly diagnosed breast carcinoma, staging.     FINDINGS:  1. Cardiac size within normal limits, no pericardial or esophageal  abnormality. Diameter of the aorta is within normal limits, no  mediastinal or hilar mass/lymphadenopathy. The lungs are clear. No  pleural effusion or acute airspace disease.     2. Typical-appearing bone island is demonstrated within the right iliac  wing, subchondral  cyst is demonstrated within the left acetabulum. No  suspicious lytic or sclerotic bone lesion.     3. 2.4 cm rounded density within the lateral aspect of the left breast  with a surgical marker centrally. Prominent 2.4 cm left axillary lymph  node, this has been previously biopsied.     4. 12 mm subcapsular cyst left lobe of the liver seen best on image 74.  Attenuation compatible with serous fluid. Second likely cyst within the  right lobe of the liver, measuring 15 mm, seen best on image #105.  Attenuation compatible with slightly complex fluid. No grossly  suspicious liver lesion identified, the adrenal glands have a  satisfactory appearance.     5. The gallbladder, pancreas, spleen and kidneys are normal. Diameter of  the aorta is within normal limits. The bladder is collapsed, contour  within normal limits. The uterus is satisfactory in size and shape, the  ovaries have been removed. There is diverticulosis of the colon without  diverticulitis. The small bowel is satisfactory in appearance. The  stomach and duodenum are unremarkable. No abdominal or pelvic  lymphadenopathy. No free intraperitoneal fluid seen.     CONCLUSION: There is a nodular density within the lateral aspect of the  left breast at the site of biopsy, enlarged left axillary lymph node  seen, this previously biopsied. No convincing evidence of metastatic  disease involving the chest, abdomen or pelvis; no suspicious bone  lesion. There appear to be 2 small hepatic cysts, 1 of these likely  complex (I would recommend liver ultrasound for confirmation). Fairly  typical-appearing bone island within the right iliac wing.    5/23/24 Bone Scan  INDICATION: Breast cancer   COMPARISON: CT chest abdomen pelvis 5/23/2024  FINDINGS: No suspicious focus of increased or decreased MDP activity.  Physiologic activity remains within the kidneys and urinary bladder with  urinary contamination.     IMPRESSION:  No scintigraphic evidence of osseous metastatic  disease.    PATHOLOGY:   4/16/24  1.  Left breast, 2:00, ultrasound-biopsies for mass: INVASIVE MAMMARY CARCINOMA, NO SPECIAL TYPE (INVASIVE DUCTAL CARCINOMA).               -Histologic grade: Abhi is logic score III (tubules = 3, nuclei = 3, mitosis = 2).               -No in situ component identified.               -Invasive carcinoma involves multiple tissue cores, measuring up to 14 mm maximally.               -No lymphovascular nor perineural invasion identified.     2. Lymph node, left axilla, core biopsies:               -Free floating fragment of carcinoma, suspicious for metastasis (see Comment).    Estrogen Receptor (ER) Status  Positive (greater than 10% of cells demonstrate nuclear positivity)   Percentage of Cells with Nuclear Positivity  %   Average Intensity of Staining  Strong   Test Type  Food and Drug Administration (FDA) cleared (test / vendor): ventana   Primary Antibody  SP1   Scoring System  Kathy   Proportion Score  5   Intensity Score  3   Total Kathy Score  8   Test(s) Performed     Progesterone Receptor (PgR) Status  Positive   Percentage of Cells with Nuclear Positivity  31-40%   Average Intensity of Staining  Moderate   Test Type  Food and Drug Administration (FDA) cleared (test / vendor): ventana   Primary Antibody  1E2   Scoring System  Kathy   Proportion Score  4   Intensity Score  2   Total Kathy Score  6   Test(s) Performed     HER2 by Immunohistochemistry  Negative (Score 1+)   Test Type  Food and Drug Administration (FDA) cleared (test / vendor): ventana   Primary Antibody  4B5   Test(s) Performed  Ki-67   Ki-67 Percentage of Positive Nuclei  95 %         Assessment & Plan   Assessment:  59 y.o. F with a new diagnosis of left breast: Invasive ductal carcinoma grade III, ER/NE +, Her2 -; T2N1M0, anatomic stage IIB, prognostic stage IIB.      Discussion:  I had an extensive discussion with the patient and her family about the nature of her breast cancer diagnosis. We  reviewed the components of breast tissue including ducts and lobules. We reviewed her pathology report in detail. We reviewed breast cancer histology, including stage, grade, ER/ID receptors, HER2 receptors and how this applies to her diagnosis. We reviewed the basics of systemic and local/regional management of breast cancer.      We discussed that in her case, systemic treatment would involve endocrine therapy and possibly chemotherapy. She will be referred to medical oncology postoperatively to discuss this further.     We reviewed potential surgical treatments to include partial mastectomy, mastectomy, sentinel lymph node biopsy and axillary node dissection and discussed the rationale associated with each approach. Regarding radiation therapy, we discussed that radiation is indicated in all cases of breast conservation and in only limited circumstances following mastectomy. Partial mastectomy with radiation and mastectomy were explained with option of reconstruction.  The patient was informed that from a survival standpoint, both procedures are oncologically equivalent.   We discussed that the primary goal of adjuvant radiation is to decrease the likelihood of local recurrence.       With further imaging and delineation of disease she was found to have a T2 breast lesion and axillary radha involvement.  As she now has stage IIb breast cancer standard of care would require staging scans be completed.  Pending these results she remains a partial mastectomy versus mastectomy candidate.  She remains anxious about starting chemotherapy.  As she has palpable N1 disease standard of care would require axillary dissection at the time of surgery.  Discussed possible downstaging of her axilla with neoadjuvant chemotherapy should her staging scans show no metastatic disease.  Discussed all algorithm of options including surgery first versus neoadjuvant therapy.  Possible role in treatment for metastatic disease should that  be identified on upcoming imaging.      I explained that most breast cancer is not hereditary, that I do not believe this plays a role in her case, and that she does not meet NCCN criteria for genetic testing. I would not recommend a bilateral mastectomy, since her risk of a second primary cancer is relatively low and endocrine therapy will further reduce her risk.  She is very concerned about her children's potential risk for breast cancer.  Discussed low likelihood of a genetic cause of her breast cancer.  And again reiterated per NCCN guidelines she does not meet criteria for genetic testing.    I described additional risks and potential complications associated with surgery, including, but not limited to, bleeding, infection, deformity/poor cosmetic result, chronic pain, numbness, seroma, hematoma, deep venous thrombosis, skin flap necrosis, disease recurrence and the possibility of requiring additional surgery. We also discussed other treatment options including the option of not undergoing any surgical treatment with a palliative rather than curative intent and the risks associated with this including disease progression. She expressed an understanding of these factors and wished to proceed.      Plan:  A multidisciplinary plan has been formulated for the patient:      # Breast Surgical Oncology:  - Remains a candidate for mastectomy versus partial mastectomy and axillary node dissection with surgery first treatment.  If she chose to proceed with neoadjuvant chemotherapy may be able to downstage her axilla and proceed with sentinel lymph node biopsy and possible axillary dissection pending response to therapy.  Had a long discussion about likely recommendation of chemotherapy following surgery.  Additional benefit of neoadjuvant chemotherapy then surgery allows for adjustment of treatment to appropriately respond to remaining burden of disease following standard of care chemotherapy.  -Consents completed and  signed. Discussed the risks and benefits of left partial mastectomy and axillary lymph node dissection at length including estimated time for procedure, postoperative recovery, and postoperative instructions. Discussed the risks to include pain, bleeding, infection, nerve injury, numbness, seroma, skin complications including necrosis, breast asymmetry, need for re-excision, lymphedema, possible need for axillary dissection at time of surgery or at a later date, lymphocele, and scar.  Patient appears to understand and wishes to proceed.  All questions were answered.  -Will follow-up discussion with medical oncology and if chooses to proceed with neoadjuvant therapy will plan to assist with port placement as indicated.  -She is strongly considering temporarily relocating to [place] to be surrounded by her parents for additional support while undergoing cancer care.  - Will continue to obtain all parts of her workup prior to initiating therapy in [place].  Will be available to share all of her documentation and workup should she relocate for care.    # Medical Oncology:  -Remains in Oncotype candidate, sent test on breast biopsy specimen to help guide chemotherapy recommendations including type and duration of therapy.  - Referral now to discuss neoadjuvant chemotherapy.  She has several questions regarding to specifics of duration of therapy and expected side effects.  - Metastatic work up negative for breast cancer    #  Radiation Oncology:  -Will refer postoperatively.    # Nurse Navigation  - Referral made today, very concerned about risk of side effects from treatment.   - not established with her PCP, and needs to be re-established   - family lives out of town, children with significant mental health concerns causing additional stress on top of recent cancer diagnoses.   -Would benefit from behavioral oncology, discussed additional resources.  She is interested in setting up an appointment with behavioral  oncology.    # Lymphedema clinic  - Referral was placed previously    # Breast Imaging  - Next Screening mammogram due: 4/2025  -Staging scans with CT chest, abdomen, pelvis.  Bone scan ordered today    #General Medical work up  -Last PCP visit was in 2022, recommended medication regimen with metformin and insulin.  Will need to obtain preoperative evaluation of A1c to better delineate risk of wound healing problems.  Plan to complete preop EKG as well.  - looking for new PCP, discussed new PCP within [facility] if interested.   - Discussed role of follow up colonoscopy and she plans to follow-up on her own, does not require referral.      Moira Mccain MD  Breast Surgical Oncology    I spent 60 minutes caring for Ms Man on this date of service. This time includes time spent by me in the following activities: preparing for the visit, reviewing tests, obtaining and/or reviewing a separately obtained history, performing a medically appropriate examination and/or evaluation , counseling and educating the patient/family/caregiver, referring and communicating with other health care professionals , documenting information in the medical record, independently interpreting results and communicating that information with the patient/family/caregiver, and care coordination.      CC:  Nely Jesus-OB/GYN  Dr. Nieves-PCP

## 2024-05-29 NOTE — H&P (VIEW-ONLY)
[facility]     Referring Provider: No ref. provider found     Chief complaint: breast mass and newly diagnosed breast cancer    Subjective    HPI: Ms. Sd Man is a 59 y.o. yo woman, seen at the request of Nely Jesus (OBGYN) for a new diagnosis of left breast cancer.      She noted an area of pain in her left breast that started several months ago.  She noted a palpable mass approximately 6 weeks ago.  She thought it might be due to increased size of the lymph node after a period of a scratchy throat.  She then presented to her OB/GYN who ordered diagnostic imaging and further workup.  She has not had recent imaging of her breast for screening.    She denies any prior history of abnormal mammograms or breast biopsies.     She reports she has a pertinent PMH of PCOS.  She has had significant stressors due to job changes and family health concerns including stressful care of her children over the past several years.  She underwent a screening colonoscopy and was found to have a suspicious polyp and close follow-up was recommended.  She has not been able to return for follow-up colonoscopy and is concerned there could be an underlying colon malignancy.  She also went through a complicated divorce before [facility].    She was joined today in clinic by her friend. They all participated in the discussion, after her examination, and she gave her consent for them to participate.    HPI 5/15/24  She completed breast MRI.  She notes the lesion in her left lateral breast feels subjectively larger.  The area continues to be painful and heavy feeling.  Denies any new symptoms.    HPI 5/29/24  Completed staging scans. No concerns for metastatic disease. US recommended for liver cyst.   She remains anxious about all possible therapies - side effects from radiation and chemotherapy. She is interested in discussion with behavioral oncology and medical oncology to discuss expected side effects and course of therapy.        Oncology/Hematology History   Malignant neoplasm of upper-outer quadrant of left breast in female, estrogen receptor positive   4/16/2024 Cancer Staged    Staging form: Breast, AJCC 8th Edition  - Clinical stage from 4/16/2024: Stage IIB (cT2, cN1(f), cM0, G3, ER+, MT+, HER2-) - Signed by Moira Mccain MD on 5/15/2024     4/30/2024 Initial Diagnosis    Malignant neoplasm of upper-outer quadrant of left breast in female, estrogen receptor positive         Review of Systems   Constitutional:  Negative for appetite change, fatigue and fever.   Respiratory:  Negative for cough and shortness of breath.    Gastrointestinal:  Negative for abdominal distention, diarrhea and nausea.   Musculoskeletal:  Negative for arthralgias and back pain.   Neurological:  Negative for dizziness, headaches and speech difficulty.   Psychiatric/Behavioral:  Positive for depression. Negative for decreased concentration and sleep disturbance. The patient is nervous/anxious.        Medications:    Current Outpatient Medications:     amphetamine-dextroamphetamine (ADDERALL) 12.5 MG tablet, TAKE 1 TABLET BY MOUTH TWICE DAILY IN THE PM, Disp: , Rfl:     azSTARys 39.2-7.8 MG capsule, , Disp: , Rfl:     Trintellix 10 MG tablet tablet, , Disp: , Rfl:     Allergies:  No Known Allergies    Medical history:  Past Medical History:   Diagnosis Date    ADHD     Depression     Obesity 11/11/2012    PCOS (polycystic ovarian syndrome)     PTSD (post-traumatic stress disorder)        Surgical History:  Past Surgical History:   Procedure Laterality Date    OOPHORECTOMY Bilateral 01/01/2014       Family History:  Family History   Problem Relation Age of Onset    Melanoma Mother 75        alive currently 79       Family Cancer History: relationship - (age of diagnosis/current age or age at death)  Breast: Denies  Ovarian: Denies  Colon: Denies  Pancreas: Denies  Prostate: Denies    Social History:   Social History     Socioeconomic History    Marital  status:    Tobacco Use    Smoking status: Never     Passive exposure: Never    Smokeless tobacco: Never   Vaping Use    Vaping status: Never Used   Substance and Sexual Activity    Alcohol use: Never    Drug use: Never    Sexual activity: Not Currently       She lives by herself  She works as a , at [employer]         GYNECOLOGIC HISTORY:   G: 3. P: 3. AB: 0.  Last menstrual period: 47  Age at menarche: 14  Age at first childbirth: 32  Lactation/How lon years  Age at menopause: 47  Total years of oral contraceptive use: None  Total years of hormone replacement therapy: Several years      Objective   PHYSICAL EXAMINATION:   Vitals:    24 0937   BP: 142/94   Pulse: 113   Resp: 18   SpO2: 96%         Examination chaperoned by Halina.  ECOG 0 - Asymptomatic  General: NAD, well appearing  Psych: a&o x 3, anxious, tangential conversation  Eyes: EOMI, no scleral icterus  ENMT: neck supple without masses or thyromegaly, mucus membranes moist  Resp: normal effort  CV: RRR, no edema   GI: soft, NT, ND  MSK: normal gait, normal ROM in bilateral shoulders  Lymph nodes:  no cervical, supraclavicular lymphadenopathy, left axilla with palpable mobile axillary lymphadenopathy  Breast: symmetric, bra 42C, broad base of the breast  Right:  No visible abnormalities on inspection while seated, with arms raised or hands on hips. No masses, skin changes, or nipple abnormalities.  Left:  No visible abnormalities on inspection while seated. No skin changes, or nipple abnormalities.  Palpable 2 cm oval-shaped abnormality in left upper outer quadrant posterior. oblong  and longer in radial dimension.     Previous IMAGING: I have independently reviewed her imagin2024 bilateral diagnostic mammogram  The breasts are heterogeneously dense which may obscure small masses.  Finding 1: There is a high density irregular mass measuring 20 mm with indistinct margins seen in the posterior one third region left breast  2:00 11 cm from nipple.  Patient indicates a palpable lump or thickening in the left breast.  The symptomatic area is clearly marked.  In the right breast no suspicious masses significant calcifications or other abnormalities are seen.  Left breast ultrasound  Finding 1: Ultrasound demonstrates an irregular solid mass with indistinct margins measuring 20 x 17 x 20 mm seen in the posterior one third region of the left breast 2:00 11 cm from the nipple.  This correlates to the palpable area.  Finding 2: There is a lymph node measuring 20 x 9 x 13 mm seen in the left breast the cortex is prominent measuring up to 4 mm in thickness  Impression:  Finding 1: Solid mass in the left breast is suspicious.  Ultrasound-guided vacuum-assisted biopsy is recommended.  Finding 2: Lymph node in the left breast is suspicious FNA possible spring-loaded or limited core biopsy is recommended.  BI-RADS 4C    4/16/2024 left ultrasound-guided biopsy  Patient's left breast in the axillary position was imaged and the abnormal lymph node was localized.  Using a 12-gauge biopsy device 5 cores were obtained.  A HydroMARK tissue marker was placed at the biopsy site.  2 view postprocedure mammogram and ultrasound confirmed the clip in the expected location.  Pathology returned as malignant and concordant.  Report describes a free-floating fragment of carcinoma, suspicious for metastasis.      5/10/24 Breast MRI  FINDINGS: Scattered fibroglandular tissue is seen throughout both  breast. Mild background parenchymal enhancement of both breasts is  noted.     In the posterior one third lateral aspect of the left breast at the 3  o'clock position on the order of 12 cm from the nipple. There is an  irregular enhancing mass that measures 2.6 cm in anterior to posterior  dimension, 2.4 cm in the superior to inferior dimension and 2.7 cm in  the mediolateral dimension. A focal signal void is seen centrally within  the mass. This represents the  biopsy-proven site of malignancy with the  internal mini cork shaped metallic clip.     No other areas of suspicious enhancement or morphology are seen in the  left breast. I see no evidence for abnormal skin, nipple or chest wall  enhancement of the left breast. There is an inferior left axillary lymph  node that measures on the order of 2 cm in greatest dimension and shows  diffuse cortical thickening with the cortex measuring up to 0.8 cm in  thickness. Mild surrounding edema is noted. This is in the expected  location of the biopsied lymph node with the associated coil-shaped  HydroMARK metallic clip at the inferior margin seen on the mammogram  dated 04/16/2024. No evidence for left internal mammary chain adenopathy  is appreciated.     There are no areas of suspicious enhancement or morphology in the right  breast. I see no evidence for abnormal skin, nipple or chest wall  enhancement of the right breast and there is no evidence for right  axillary or internal mammary chain adenopathy.        IMPRESSION:  1. Biopsy-proven malignancy in the left breast in the posterior one  third at the 3 o'clock position that measures on the order of 2.7 cm in  greatest dimension. No other suspicious findings are seen within the  left breast. A left axillary lymph node that has undergone prior biopsy  and shows diffuse cortical thickening is noted and is suspicious for  left axillary adenopathy are noted.  2. There are no findings suspicious for malignancy in the right breast.    5/23/24 CT Chest abdomen/pelvis  CLINICAL INFORMATION: Newly diagnosed breast carcinoma, staging.     FINDINGS:  1. Cardiac size within normal limits, no pericardial or esophageal  abnormality. Diameter of the aorta is within normal limits, no  mediastinal or hilar mass/lymphadenopathy. The lungs are clear. No  pleural effusion or acute airspace disease.     2. Typical-appearing bone island is demonstrated within the right iliac  wing, subchondral  cyst is demonstrated within the left acetabulum. No  suspicious lytic or sclerotic bone lesion.     3. 2.4 cm rounded density within the lateral aspect of the left breast  with a surgical marker centrally. Prominent 2.4 cm left axillary lymph  node, this has been previously biopsied.     4. 12 mm subcapsular cyst left lobe of the liver seen best on image 74.  Attenuation compatible with serous fluid. Second likely cyst within the  right lobe of the liver, measuring 15 mm, seen best on image #105.  Attenuation compatible with slightly complex fluid. No grossly  suspicious liver lesion identified, the adrenal glands have a  satisfactory appearance.     5. The gallbladder, pancreas, spleen and kidneys are normal. Diameter of  the aorta is within normal limits. The bladder is collapsed, contour  within normal limits. The uterus is satisfactory in size and shape, the  ovaries have been removed. There is diverticulosis of the colon without  diverticulitis. The small bowel is satisfactory in appearance. The  stomach and duodenum are unremarkable. No abdominal or pelvic  lymphadenopathy. No free intraperitoneal fluid seen.     CONCLUSION: There is a nodular density within the lateral aspect of the  left breast at the site of biopsy, enlarged left axillary lymph node  seen, this previously biopsied. No convincing evidence of metastatic  disease involving the chest, abdomen or pelvis; no suspicious bone  lesion. There appear to be 2 small hepatic cysts, 1 of these likely  complex (I would recommend liver ultrasound for confirmation). Fairly  typical-appearing bone island within the right iliac wing.    5/23/24 Bone Scan  INDICATION: Breast cancer   COMPARISON: CT chest abdomen pelvis 5/23/2024  FINDINGS: No suspicious focus of increased or decreased MDP activity.  Physiologic activity remains within the kidneys and urinary bladder with  urinary contamination.     IMPRESSION:  No scintigraphic evidence of osseous metastatic  disease.    PATHOLOGY:   4/16/24  1.  Left breast, 2:00, ultrasound-biopsies for mass: INVASIVE MAMMARY CARCINOMA, NO SPECIAL TYPE (INVASIVE DUCTAL CARCINOMA).               -Histologic grade: Abhi is logic score III (tubules = 3, nuclei = 3, mitosis = 2).               -No in situ component identified.               -Invasive carcinoma involves multiple tissue cores, measuring up to 14 mm maximally.               -No lymphovascular nor perineural invasion identified.     2. Lymph node, left axilla, core biopsies:               -Free floating fragment of carcinoma, suspicious for metastasis (see Comment).    Estrogen Receptor (ER) Status  Positive (greater than 10% of cells demonstrate nuclear positivity)   Percentage of Cells with Nuclear Positivity  %   Average Intensity of Staining  Strong   Test Type  Food and Drug Administration (FDA) cleared (test / vendor): ventana   Primary Antibody  SP1   Scoring System  Kathy   Proportion Score  5   Intensity Score  3   Total Kathy Score  8   Test(s) Performed     Progesterone Receptor (PgR) Status  Positive   Percentage of Cells with Nuclear Positivity  31-40%   Average Intensity of Staining  Moderate   Test Type  Food and Drug Administration (FDA) cleared (test / vendor): ventana   Primary Antibody  1E2   Scoring System  Kathy   Proportion Score  4   Intensity Score  2   Total Kathy Score  6   Test(s) Performed     HER2 by Immunohistochemistry  Negative (Score 1+)   Test Type  Food and Drug Administration (FDA) cleared (test / vendor): ventana   Primary Antibody  4B5   Test(s) Performed  Ki-67   Ki-67 Percentage of Positive Nuclei  95 %         Assessment & Plan   Assessment:  59 y.o. F with a new diagnosis of left breast: Invasive ductal carcinoma grade III, ER/CT +, Her2 -; T2N1M0, anatomic stage IIB, prognostic stage IIB.      Discussion:  I had an extensive discussion with the patient and her family about the nature of her breast cancer diagnosis. We  reviewed the components of breast tissue including ducts and lobules. We reviewed her pathology report in detail. We reviewed breast cancer histology, including stage, grade, ER/OR receptors, HER2 receptors and how this applies to her diagnosis. We reviewed the basics of systemic and local/regional management of breast cancer.      We discussed that in her case, systemic treatment would involve endocrine therapy and possibly chemotherapy. She will be referred to medical oncology postoperatively to discuss this further.     We reviewed potential surgical treatments to include partial mastectomy, mastectomy, sentinel lymph node biopsy and axillary node dissection and discussed the rationale associated with each approach. Regarding radiation therapy, we discussed that radiation is indicated in all cases of breast conservation and in only limited circumstances following mastectomy. Partial mastectomy with radiation and mastectomy were explained with option of reconstruction.  The patient was informed that from a survival standpoint, both procedures are oncologically equivalent.   We discussed that the primary goal of adjuvant radiation is to decrease the likelihood of local recurrence.       With further imaging and delineation of disease she was found to have a T2 breast lesion and axillary radha involvement.  As she now has stage IIb breast cancer standard of care would require staging scans be completed.  Pending these results she remains a partial mastectomy versus mastectomy candidate.  She remains anxious about starting chemotherapy.  As she has palpable N1 disease standard of care would require axillary dissection at the time of surgery.  Discussed possible downstaging of her axilla with neoadjuvant chemotherapy should her staging scans show no metastatic disease.  Discussed all algorithm of options including surgery first versus neoadjuvant therapy.  Possible role in treatment for metastatic disease should that  be identified on upcoming imaging.      I explained that most breast cancer is not hereditary, that I do not believe this plays a role in her case, and that she does not meet NCCN criteria for genetic testing. I would not recommend a bilateral mastectomy, since her risk of a second primary cancer is relatively low and endocrine therapy will further reduce her risk.  She is very concerned about her children's potential risk for breast cancer.  Discussed low likelihood of a genetic cause of her breast cancer.  And again reiterated per NCCN guidelines she does not meet criteria for genetic testing.    I described additional risks and potential complications associated with surgery, including, but not limited to, bleeding, infection, deformity/poor cosmetic result, chronic pain, numbness, seroma, hematoma, deep venous thrombosis, skin flap necrosis, disease recurrence and the possibility of requiring additional surgery. We also discussed other treatment options including the option of not undergoing any surgical treatment with a palliative rather than curative intent and the risks associated with this including disease progression. She expressed an understanding of these factors and wished to proceed.      Plan:  A multidisciplinary plan has been formulated for the patient:      # Breast Surgical Oncology:  - Remains a candidate for mastectomy versus partial mastectomy and axillary node dissection with surgery first treatment.  If she chose to proceed with neoadjuvant chemotherapy may be able to downstage her axilla and proceed with sentinel lymph node biopsy and possible axillary dissection pending response to therapy.  Had a long discussion about likely recommendation of chemotherapy following surgery.  Additional benefit of neoadjuvant chemotherapy then surgery allows for adjustment of treatment to appropriately respond to remaining burden of disease following standard of care chemotherapy.  -Consents completed and  signed. Discussed the risks and benefits of left partial mastectomy and axillary lymph node dissection at length including estimated time for procedure, postoperative recovery, and postoperative instructions. Discussed the risks to include pain, bleeding, infection, nerve injury, numbness, seroma, skin complications including necrosis, breast asymmetry, need for re-excision, lymphedema, possible need for axillary dissection at time of surgery or at a later date, lymphocele, and scar.  Patient appears to understand and wishes to proceed.  All questions were answered.  -Will follow-up discussion with medical oncology and if chooses to proceed with neoadjuvant therapy will plan to assist with port placement as indicated.  -She is strongly considering temporarily relocating to [place] to be surrounded by her parents for additional support while undergoing cancer care.  - Will continue to obtain all parts of her workup prior to initiating therapy in [place].  Will be available to share all of her documentation and workup should she relocate for care.    # Medical Oncology:  -Remains in Oncotype candidate, sent test on breast biopsy specimen to help guide chemotherapy recommendations including type and duration of therapy.  - Referral now to discuss neoadjuvant chemotherapy.  She has several questions regarding to specifics of duration of therapy and expected side effects.  - Metastatic work up negative for breast cancer    #  Radiation Oncology:  -Will refer postoperatively.    # Nurse Navigation  - Referral made today, very concerned about risk of side effects from treatment.   - not established with her PCP, and needs to be re-established   - family lives out of town, children with significant mental health concerns causing additional stress on top of recent cancer diagnoses.   -Would benefit from behavioral oncology, discussed additional resources.  She is interested in setting up an appointment with behavioral  oncology.    # Lymphedema clinic  - Referral was placed previously    # Breast Imaging  - Next Screening mammogram due: 4/2025  -Staging scans with CT chest, abdomen, pelvis.  Bone scan ordered today    #General Medical work up  -Last PCP visit was in 2022, recommended medication regimen with metformin and insulin.  Will need to obtain preoperative evaluation of A1c to better delineate risk of wound healing problems.  Plan to complete preop EKG as well.  - looking for new PCP, discussed new PCP within [place] if interested.   - Discussed role of follow up colonoscopy and she plans to follow-up on her own, does not require referral.      Moira Mccain MD  Breast Surgical Oncology    I spent 60 minutes caring for Ms Man on this date of service. This time includes time spent by me in the following activities: preparing for the visit, reviewing tests, obtaining and/or reviewing a separately obtained history, performing a medically appropriate examination and/or evaluation , counseling and educating the patient/family/caregiver, referring and communicating with other health care professionals , documenting information in the medical record, independently interpreting results and communicating that information with the patient/family/caregiver, and care coordination.      CC:  eNly Jesus-OB/GYN  Dr. Nieves-PCP

## 2024-05-30 LAB
DX PRELIMINARY: NORMAL
LAB AP CASE REPORT: NORMAL
LAB AP CLINICAL INFORMATION: NORMAL
LAB AP DIAGNOSIS COMMENT: NORMAL
LAB AP SPECIAL STAINS: NORMAL
LAB AP SYNOPTIC CHECKLIST: NORMAL
PATH REPORT.ADDENDUM SPEC: NORMAL
PATH REPORT.FINAL DX SPEC: NORMAL
PATH REPORT.GROSS SPEC: NORMAL

## 2024-05-31 ENCOUNTER — TELEPHONE (OUTPATIENT)
Dept: SURGERY | Facility: CLINIC | Age: 59
End: 2024-05-31
Payer: COMMERCIAL

## 2024-05-31 ENCOUNTER — TELEPHONE (OUTPATIENT)
Dept: PSYCHIATRY | Facility: HOSPITAL | Age: 59
End: 2024-05-31
Payer: COMMERCIAL

## 2024-05-31 NOTE — TELEPHONE ENCOUNTER
Referral received from  for patient to be seen in our Supportive Oncology clinic.  Called patient on 5-30-24 and LVM.  Called again on 5-31-24 and no answer and unable to leave message due to mailbox being Full.  Left direct number on  5-30-24/   [phone]/SP

## 2024-05-31 NOTE — TELEPHONE ENCOUNTER
Called this PT to let her know that her ultrasound had been shceduled.    U/S: 06/04/2024  Arrive: 07:45 AM  Scan: 08:00 AM    PT gave a verbal understanding of appt date, time, and location    Appt reminder sent out 05/31/2024    MEN

## 2024-06-03 ENCOUNTER — PATIENT OUTREACH (OUTPATIENT)
Dept: OTHER | Facility: HOSPITAL | Age: 59
End: 2024-06-03
Payer: COMMERCIAL

## 2024-06-03 NOTE — PROGRESS NOTES
Called Ms. Man to see how she was doing. She stated she has multiple appointments this week and is working through her surgery and treatment decisions. She will meet with Dr. Chawla June 6th to discuss chemo options. She will reach out with any questions that arise after that. She stated she has an appointment set with Chari Joe this week as well. She has no other needs at this time. She was thankful for the call and will reach out if any questions or needs arise.

## 2024-06-04 ENCOUNTER — HOSPITAL ENCOUNTER (OUTPATIENT)
Dept: ULTRASOUND IMAGING | Facility: HOSPITAL | Age: 59
Discharge: HOME | End: 2024-06-04
Payer: COMMERCIAL

## 2024-06-04 DIAGNOSIS — C50.412 MALIGNANT NEOPLASM OF UPPER-OUTER QUADRANT OF LEFT BREAST IN FEMALE, ESTROGEN RECEPTOR POSITIVE: ICD-10-CM

## 2024-06-04 DIAGNOSIS — Z17.0 MALIGNANT NEOPLASM OF UPPER-OUTER QUADRANT OF LEFT BREAST IN FEMALE, ESTROGEN RECEPTOR POSITIVE: ICD-10-CM

## 2024-06-04 PROCEDURE — 76705 ECHO EXAM OF ABDOMEN: CPT

## 2024-06-06 ENCOUNTER — LAB (OUTPATIENT)
Dept: OTHER | Facility: HOSPITAL | Age: 59
End: 2024-06-06
Payer: COMMERCIAL

## 2024-06-06 ENCOUNTER — CONSULT (OUTPATIENT)
Dept: ONCOLOGY | Facility: CLINIC | Age: 59
End: 2024-06-06
Payer: COMMERCIAL

## 2024-06-06 VITALS
RESPIRATION RATE: 18 BRPM | HEIGHT: 64 IN | DIASTOLIC BLOOD PRESSURE: 99 MMHG | SYSTOLIC BLOOD PRESSURE: 134 MMHG | TEMPERATURE: 98.2 F | WEIGHT: 242 LBS | HEART RATE: 114 BPM | BODY MASS INDEX: 41.32 KG/M2 | OXYGEN SATURATION: 97 %

## 2024-06-06 DIAGNOSIS — C50.412 MALIGNANT NEOPLASM OF UPPER-OUTER QUADRANT OF LEFT BREAST IN FEMALE, ESTROGEN RECEPTOR POSITIVE: Primary | ICD-10-CM

## 2024-06-06 DIAGNOSIS — Z51.11 ENCOUNTER FOR ANTINEOPLASTIC CHEMOTHERAPY: ICD-10-CM

## 2024-06-06 DIAGNOSIS — Z17.0 MALIGNANT NEOPLASM OF UPPER-OUTER QUADRANT OF LEFT BREAST IN FEMALE, ESTROGEN RECEPTOR POSITIVE: Primary | ICD-10-CM

## 2024-06-06 DIAGNOSIS — C50.919 MALIGNANT NEOPLASM OF FEMALE BREAST, UNSPECIFIED ESTROGEN RECEPTOR STATUS, UNSPECIFIED LATERALITY, UNSPECIFIED SITE OF BREAST: Primary | ICD-10-CM

## 2024-06-06 LAB
BASOPHILS # BLD AUTO: 0.05 10*3/MM3 (ref 0–0.2)
BASOPHILS NFR BLD AUTO: 0.6 % (ref 0–1.5)
DEPRECATED RDW RBC AUTO: 41.9 FL (ref 37–54)
EOSINOPHIL # BLD AUTO: 0.2 10*3/MM3 (ref 0–0.4)
EOSINOPHIL NFR BLD AUTO: 2.4 % (ref 0.3–6.2)
ERYTHROCYTE [DISTWIDTH] IN BLOOD BY AUTOMATED COUNT: 13 % (ref 12.3–15.4)
HCT VFR BLD AUTO: 44.1 % (ref 34–46.6)
HGB BLD-MCNC: 15.4 G/DL (ref 12–15.9)
IMM GRANULOCYTES # BLD AUTO: 0.04 10*3/MM3 (ref 0–0.05)
IMM GRANULOCYTES NFR BLD AUTO: 0.5 % (ref 0–0.5)
LYMPHOCYTES # BLD AUTO: 3.19 10*3/MM3 (ref 0.7–3.1)
LYMPHOCYTES NFR BLD AUTO: 38.9 % (ref 19.6–45.3)
MCH RBC QN AUTO: 30.9 PG (ref 26.6–33)
MCHC RBC AUTO-ENTMCNC: 34.9 G/DL (ref 31.5–35.7)
MCV RBC AUTO: 88.6 FL (ref 79–97)
MONOCYTES # BLD AUTO: 0.66 10*3/MM3 (ref 0.1–0.9)
MONOCYTES NFR BLD AUTO: 8.1 % (ref 5–12)
NEUTROPHILS NFR BLD AUTO: 4.05 10*3/MM3 (ref 1.7–7)
NEUTROPHILS NFR BLD AUTO: 49.5 % (ref 42.7–76)
NRBC BLD AUTO-RTO: 0 /100 WBC (ref 0–0.2)
PLATELET # BLD AUTO: 274 10*3/MM3 (ref 140–450)
PMV BLD AUTO: 8.5 FL (ref 6–12)
RBC # BLD AUTO: 4.98 10*6/MM3 (ref 3.77–5.28)
WBC NRBC COR # BLD AUTO: 8.19 10*3/MM3 (ref 3.4–10.8)

## 2024-06-06 PROCEDURE — 85025 COMPLETE CBC W/AUTO DIFF WBC: CPT | Performed by: INTERNAL MEDICINE

## 2024-06-06 PROCEDURE — 36415 COLL VENOUS BLD VENIPUNCTURE: CPT

## 2024-06-06 NOTE — PROGRESS NOTES
Subjective   Sd Man is a 59 y.o. female.  Referred by Dr. Mccain for left breast invasive ductal carcinoma    History of Present Illness     Patient is a 59-year-old postmenopausal lady who presented with a palpable abnormality of the left breast She initially felt and March 2024.  Patient feels like the mass has increased in size since she initially palpated it.  She brought this to the attention of her gynecologist who ordered diagnostic imaging and further workup.    Patient denies any previous abnormal mammograms or breast biopsies.    She has a diagnosis of PCOS and possible diabetes.  Her last normal mammogram prior to the most recent one was in 2015.    Patient reports extremely stressful situation at home with 3 of her daughters having had mental health issues and her ex- with bipolar disorder.  She has been dealing with a lot of stress since 2017.  She currently works as a .  She was working at [employer] in the past but no longer there.    Patient currently sees [facility] and on medications for anxiety and depression.  She also sees a counselor on a regular basis and has an appointment coming up with them.    Her support system is mainly her parents who live in [place] and also she has a few friends here.  During the summer if she were to do chemotherapy she prefers to get that done in [place] and subsequently transferred care back to us.    4/9/2024-bilateral diagnostic mammogram  Breast that heterogeneously dense.  Finding 1 high density irregular mass measuring 20 mm with indistinct margins at 2:00 in the left breast, 11 cm from the nipple.  This indicates the palpable lump in the left breast.  No suspicious abnormalities of the right breast.    Left breast ultrasound  Finding 1.irregular solid mass measuring 20 x 17 x 20 mm in the posterior one third left breast, 2:00, 11 cm from the nipple.  Finding 2.lymph node measures 20 x 9 x 13 mm on the left  breast.  4 mm cortical thickness noted.    Impression  Finding 1.solid mass in the left breast is suspicious, ultrasound-guided biopsy recommended  Finding 2.lymph node in the left breast is suspicious, FNA and possible spring-loaded or limited core biopsy is recommended.    4/16/2024-left breast ultrasound-guided biopsy  1.left breast 2:00-invasive ductal carcinoma  Grade 3  Invasive carcinoma involves multiple tissue cores and measures up to 14 mm  ER +91 to 100% strong  MI +31 to 40% moderate  HER2 negative, 1+  Ki-67 95%    2.lymph node of the left axilla with free-floating fragments of carcinoma, suspicious for metastasis.    5/10/2024-bilateral breast MRI  Biopsy-proven malignancy in the left breast measuring 2.7 cm at 3:00.  No other suspicious findings are seen.  Left axilla lymph node has undergone prior biopsy and shows diffuse cortical thickening suspicious for left axilla lymphadenopathy.  No suspicious abnormalities of the right breast.    5/23/2024-CT of the chest abdomen and pelvis  1.bone island in the right iliac wing, subchondral cyst in the left acetabulum.  2.2 0.4 cm rounded density in the lateral aspect of the left breast with a surgical marker.  2.4 cm left axillary lymph node.  3.12 millimeters subcapsular cyst in the left lobe of the liver, attenuation compatible with serous fluid.  Second likely cyst within the right lobe of the liver measuring 15 mm.  Attenuation compatible with slightly complex fluid.  No grossly suspicious liver lesion identified.  Further imaging of the liver with an ultrasound recommended.  4.no clear evidence of metastatic disease.    5/23/2024-bone scan without any evidence of metastatic disease    Patient presents today to discuss neoadjuvant chemotherapy given the high-grade and high Ki-67 of the tumor although the tumor is strongly ER positive.    She is extremely reluctant to the idea of receiving chemotherapy as she is concerned about feeling sick and she wants  "to work for at least 1 year.    The following portions of the patient's history were reviewed and updated as appropriate: allergies, current medications, past family history, past medical history, past social history, past surgical history, and problem list.    Past Medical History:   Diagnosis Date    ADHD     Depression     Obesity 2012    PCOS (polycystic ovarian syndrome)     PTSD (post-traumatic stress disorder)         Past Surgical History:   Procedure Laterality Date    OOPHORECTOMY Bilateral 2014        Family History   Problem Relation Age of Onset    Melanoma Mother 75        alive currently 79        Social History     Socioeconomic History    Marital status:    Tobacco Use    Smoking status: Never     Passive exposure: Never    Smokeless tobacco: Never   Vaping Use    Vaping status: Never Used   Substance and Sexual Activity    Alcohol use: Never    Drug use: Never    Sexual activity: Not Currently        OB History    No obstetric history on file.      Age at menarche-14  Age at first live childbirth-32   3 para 3  0  Age at menopause-47  Breast-feeding for 2 to 3 years  Oral contraceptive pill use none  Hormone replacement therapy for 2 years    No Known Allergies         Review of Systems      Objective   Blood pressure 134/99, pulse 114, temperature 98.2 °F (36.8 °C), temperature source Oral, resp. rate 18, height 162 cm (63.78\"), weight 110 kg (242 lb), SpO2 97%.   Physical Exam  Vitals reviewed.   Constitutional:       Appearance: Normal appearance. She is obese.   HENT:      Nose: Nose normal.      Mouth/Throat:      Pharynx: Oropharynx is clear.   Eyes:      Conjunctiva/sclera: Conjunctivae normal.   Cardiovascular:      Rate and Rhythm: Normal rate and regular rhythm.   Pulmonary:      Effort: Pulmonary effort is normal.   Abdominal:      General: Abdomen is flat.   Musculoskeletal:         General: Normal range of motion.      Cervical back: Normal range of " motion.   Skin:     General: Skin is warm.   Neurological:      General: No focal deficit present.      Mental Status: She is alert and oriented to person, place, and time.   Psychiatric:         Mood and Affect: Mood normal.         Behavior: Behavior normal.         Thought Content: Thought content normal.         Judgment: Judgment normal.       Breast Exam: Right breast appears normal on inspection.  No palpable abnormalities of the right breast.  Left breast on inspection no significant abnormalities other than biopsy site changes.  On palpation at 2:00, 10 to 11 cm from the nipple there is a 2.8 x 2.8 cm mass.  There is an easily palpable left axillary lymph node measuring approximately 2 cm.  No cervical or supraclavicular lymphadenopathy.    Hospital Outpatient Visit on 05/23/2024   Component Date Value Ref Range Status    Creatinine 05/23/2024 0.80  0.60 - 1.30 mg/dL Final    Serial Number: [id number]:  [id number]        NM Bone Scan Whole Body    Result Date: 5/28/2024  No scintigraphic evidence of osseous metastatic disease.   This report was finalized on 5/28/2024 2:19 PM by Dr. Arturo Ta M.D on Workstation: [id number]      MRI Breast Bilateral Diagnostic W WO Contrast    Result Date: 5/13/2024  1. Biopsy-proven malignancy in the left breast in the posterior one third at the 3 o'clock position that measures on the order of 2.7 cm in greatest dimension. No other suspicious findings are seen within the left breast. A left axillary lymph node that has undergone prior biopsy and shows diffuse cortical thickening is noted and is suspicious for left axillary adenopathy are noted. 2. There are no findings suspicious for malignancy in the right breast.  BI-RADS category 6: Known biopsy-proven malignancy.  This report was finalized on 5/13/2024 12:24 PM by Dr. Alpesh Dennis M.D on Workstation: [id number]        MRI of the breast images independently reviewed interpreted by me in detail summarized  above  CT chest abdomen pelvis and bone scan images independently reviewed and interpreted by me in detail summarized above    Assessment & Plan       *Left breast invasive ductal carcinoma  Clinical T2 N1a M0, Anatomic stage IIb, prognostic stage IIa.  The tumor is grade 3, Ki-67 95%, ER +91 to 100% strong, VT +31 to 40% moderate.  Discussed at length the details of imaging and pathology report.Discussed the origin of breast cancer from the ducts and the lobules and the histological type of breast cancer based on site of origin. Discussed the tumor size, lymph node status and stage of the cancer. Explained the presence of DCIS. Discussed the receptor status including ER, VT and her-2 kenny and their significance in determining the biology and treatment. Also discussed the importance of grade and ki-67.   We discussed today that typically for ER positive breast cancers in postmenopausal women we would like to proceed with surgery followed by Oncotype DX recurrence goal use to decide on adjuvant chemotherapy needs however in her situation given that she has lymph node positive disease with a high-grade tumor and a high Ki-67 we feel like they would be a definite benefit from chemotherapy and downstaging the tumor and potentially avoiding axillary dissection if possible.  Due to this reason I recommend that she proceed with neoadjuvant chemotherapy.  An Oncotype DX recurrence core has been sent on the core on 5/29/2024 and ETA is 6/14/2024.  Patient would like to wait on the Oncotype DX recurrence score prior to making a decision.  We reviewed the 2 chemotherapy regimens available including Taxotere and Cytoxan as well as Adriamycin, Cytoxan and Taxol.  Reviewed the adverse effects with patient at length including but not limited to myelosuppression, increased risk of infections, nausea, vomiting, alopecia, hypersensitivity reactions, risk of extravasation, neurotoxicity, cardiotoxicity, risk of leukemia.  Patient  inquired about [place] and she would like to think about the 2 options and review the Oncotype DX recurrence score before making a decision.  If she were to proceed with chemotherapy she would like to receive chemotherapy at [place] during summer where her parents live.  Subsequently she would like to transfer care back to us to finish out the chemotherapy.  I will plan to see the patient back next week to review all of these results as well as pending information.    *Severe anxiety and suicidal thoughts  She is a current patient of [facility].  She sees a psychiatric nurse practitioner as well as a counselor and has an appointment with them.  Currently on Trintellix 10 mg.  Severe stressors as reported in the HPI.      *Obesity  Class III  BMI 41.8      *Possible diabetes-she will require follow-up with her primary care physician for management of the same.      *Cardiac health-refer to cardiology and baseline echocardiogram will be obtained.      *Follow-up-1 week    110 minutes total spent on the encounter on the same day including reviewing the medical records, reviewing the imaging studies, face-to-face time, care coordination and documentation on the same day, discussed with Dr. Mccain.

## 2024-06-07 ENCOUNTER — PATIENT OUTREACH (OUTPATIENT)
Dept: OTHER | Facility: HOSPITAL | Age: 59
End: 2024-06-07
Payer: COMMERCIAL

## 2024-06-07 ENCOUNTER — TELEPHONE (OUTPATIENT)
Dept: SURGERY | Facility: CLINIC | Age: 59
End: 2024-06-07
Payer: COMMERCIAL

## 2024-06-07 ENCOUNTER — PREP FOR SURGERY (OUTPATIENT)
Dept: OTHER | Facility: HOSPITAL | Age: 59
End: 2024-06-07
Payer: COMMERCIAL

## 2024-06-07 ENCOUNTER — PATIENT ROUNDING (BHMG ONLY) (OUTPATIENT)
Dept: ONCOLOGY | Facility: CLINIC | Age: 59
End: 2024-06-07
Payer: COMMERCIAL

## 2024-06-07 ENCOUNTER — TELEPHONE (OUTPATIENT)
Dept: ONCOLOGY | Facility: CLINIC | Age: 59
End: 2024-06-07
Payer: COMMERCIAL

## 2024-06-07 ENCOUNTER — HOSPITAL ENCOUNTER (OUTPATIENT)
Dept: CARDIOLOGY | Facility: HOSPITAL | Age: 59
Discharge: HOME | End: 2024-06-07
Payer: COMMERCIAL

## 2024-06-07 VITALS
SYSTOLIC BLOOD PRESSURE: 128 MMHG | OXYGEN SATURATION: 97 % | WEIGHT: 242 LBS | HEART RATE: 93 BPM | HEIGHT: 64 IN | DIASTOLIC BLOOD PRESSURE: 88 MMHG | BODY MASS INDEX: 41.32 KG/M2

## 2024-06-07 DIAGNOSIS — C50.412 MALIGNANT NEOPLASM OF UPPER-OUTER QUADRANT OF LEFT BREAST IN FEMALE, ESTROGEN RECEPTOR POSITIVE: Primary | ICD-10-CM

## 2024-06-07 DIAGNOSIS — Z51.11 ENCOUNTER FOR ANTINEOPLASTIC CHEMOTHERAPY: ICD-10-CM

## 2024-06-07 DIAGNOSIS — Z17.0 MALIGNANT NEOPLASM OF UPPER-OUTER QUADRANT OF LEFT BREAST IN FEMALE, ESTROGEN RECEPTOR POSITIVE: Primary | ICD-10-CM

## 2024-06-07 DIAGNOSIS — C50.412 MALIGNANT NEOPLASM OF UPPER-OUTER QUADRANT OF LEFT BREAST IN FEMALE, ESTROGEN RECEPTOR POSITIVE: ICD-10-CM

## 2024-06-07 DIAGNOSIS — Z17.0 MALIGNANT NEOPLASM OF UPPER-OUTER QUADRANT OF LEFT BREAST IN FEMALE, ESTROGEN RECEPTOR POSITIVE: ICD-10-CM

## 2024-06-07 LAB
AORTIC ARCH: 2.5 CM
ASCENDING AORTA: 3.5 CM
BH CV ECHO LEFT VENTRICLE GLOBAL LONGITUDINAL STRAIN: -19.6 %
BH CV ECHO MEAS - ACS: 2.17 CM
BH CV ECHO MEAS - AO MAX PG: 4.5 MMHG
BH CV ECHO MEAS - AO MEAN PG: 3 MMHG
BH CV ECHO MEAS - AO ROOT DIAM: 3.5 CM
BH CV ECHO MEAS - AO V2 MAX: 106 CM/SEC
BH CV ECHO MEAS - AO V2 VTI: 20.2 CM
BH CV ECHO MEAS - AVA(I,D): 2.34 CM2
BH CV ECHO MEAS - EDV(CUBED): 64 ML
BH CV ECHO MEAS - EDV(MOD-SP2): 105 ML
BH CV ECHO MEAS - EDV(MOD-SP4): 105 ML
BH CV ECHO MEAS - EF(MOD-BP): 65 %
BH CV ECHO MEAS - EF(MOD-SP2): 64.8 %
BH CV ECHO MEAS - EF(MOD-SP4): 64.8 %
BH CV ECHO MEAS - EF_3D-VOL: 64 %
BH CV ECHO MEAS - ESV(CUBED): 11.9 ML
BH CV ECHO MEAS - ESV(MOD-SP2): 37 ML
BH CV ECHO MEAS - ESV(MOD-SP4): 37 ML
BH CV ECHO MEAS - FS: 42.9 %
BH CV ECHO MEAS - IVS/LVPW: 1.15 CM
BH CV ECHO MEAS - IVSD: 1.5 CM
BH CV ECHO MEAS - LAT PEAK E' VEL: 9.8 CM/SEC
BH CV ECHO MEAS - LV DIASTOLIC VOL/BSA (35-75): 49.5 CM2
BH CV ECHO MEAS - LV MASS(C)D: 209 GRAMS
BH CV ECHO MEAS - LV MAX PG: 3.3 MMHG
BH CV ECHO MEAS - LV MEAN PG: 2 MMHG
BH CV ECHO MEAS - LV SYSTOLIC VOL/BSA (12-30): 17.4 CM2
BH CV ECHO MEAS - LV V1 MAX: 90.4 CM/SEC
BH CV ECHO MEAS - LV V1 VTI: 14.7 CM
BH CV ECHO MEAS - LVIDD: 4 CM
BH CV ECHO MEAS - LVIDS: 2.28 CM
BH CV ECHO MEAS - LVOT AREA: 3.2 CM2
BH CV ECHO MEAS - LVOT DIAM: 2.02 CM
BH CV ECHO MEAS - LVPWD: 1.3 CM
BH CV ECHO MEAS - MED PEAK E' VEL: 7.6 CM/SEC
BH CV ECHO MEAS - MV A DUR: 0.1 SEC
BH CV ECHO MEAS - MV A MAX VEL: 81 CM/SEC
BH CV ECHO MEAS - MV DEC SLOPE: 353.1 CM/SEC2
BH CV ECHO MEAS - MV DEC TIME: 0.18 SEC
BH CV ECHO MEAS - MV E MAX VEL: 55.3 CM/SEC
BH CV ECHO MEAS - MV E/A: 0.68
BH CV ECHO MEAS - MV MAX PG: 3.4 MMHG
BH CV ECHO MEAS - MV MEAN PG: 1.39 MMHG
BH CV ECHO MEAS - MV P1/2T: 54.8 MSEC
BH CV ECHO MEAS - MV V2 VTI: 15.7 CM
BH CV ECHO MEAS - MVA(P1/2T): 4 CM2
BH CV ECHO MEAS - MVA(VTI): 3 CM2
BH CV ECHO MEAS - PA ACC TIME: 0.12 SEC
BH CV ECHO MEAS - PA V2 MAX: 104.5 CM/SEC
BH CV ECHO MEAS - PULM A REVS DUR: 0.07 SEC
BH CV ECHO MEAS - PULM A REVS VEL: 58.7 CM/SEC
BH CV ECHO MEAS - PULM DIAS VEL: 38.1 CM/SEC
BH CV ECHO MEAS - PULM S/D: 1.69
BH CV ECHO MEAS - PULM SYS VEL: 64.3 CM/SEC
BH CV ECHO MEAS - QP/QS: 1.03
BH CV ECHO MEAS - RV MAX PG: 2.9 MMHG
BH CV ECHO MEAS - RV V1 MAX: 85.7 CM/SEC
BH CV ECHO MEAS - RV V1 VTI: 14.7 CM
BH CV ECHO MEAS - RVOT DIAM: 2.05 CM
BH CV ECHO MEAS - SUP REN AO DIAM: 2.2 CM
BH CV ECHO MEAS - SV(LVOT): 47.2 ML
BH CV ECHO MEAS - SV(MOD-SP2): 68 ML
BH CV ECHO MEAS - SV(MOD-SP4): 68 ML
BH CV ECHO MEAS - SV(RVOT): 48.7 ML
BH CV ECHO MEAS - SVI(LVOT): 22.3 ML/M2
BH CV ECHO MEAS - SVI(MOD-SP2): 32.1 ML/M2
BH CV ECHO MEAS - SVI(MOD-SP4): 32.1 ML/M2
BH CV ECHO MEAS - TAPSE (>1.6): 2.7 CM
BH CV ECHO MEASUREMENTS AVERAGE E/E' RATIO: 6.36
BH CV XLRA - RV BASE: 3.4 CM
BH CV XLRA - RV LENGTH: 6.8 CM
BH CV XLRA - RV MID: 2.41 CM
BH CV XLRA - TDI S': 15.8 CM/SEC
LEFT ATRIUM VOLUME INDEX: 18.1 ML/M2
SINUS: 3.2 CM
STJ: 2.9 CM

## 2024-06-07 PROCEDURE — 93306 TTE W/DOPPLER COMPLETE: CPT

## 2024-06-07 PROCEDURE — 25510000001 PERFLUTREN (DEFINITY) 8.476 MG IN SODIUM CHLORIDE (PF) 0.9 % 10 ML INJECTION: Performed by: INTERNAL MEDICINE

## 2024-06-07 PROCEDURE — 93356 MYOCRD STRAIN IMG SPCKL TRCK: CPT

## 2024-06-07 RX ADMIN — PERFLUTREN 1.5 ML: 6.52 INJECTION, SUSPENSION INTRAVENOUS at 15:05

## 2024-06-07 NOTE — TELEPHONE ENCOUNTER
----- Message from Estefania GRANGER sent at 6/7/2024 11:45 AM EDT -----  New pt yesterday, she needs ECHO and cardio-onc appt prior to follow up    Thank you

## 2024-06-07 NOTE — PROGRESS NOTES
Ms. Man came into the cancer center to discuss workout programs, support groups, financial aid applications and massage therapy. Gave her info on all of those with contact information as well. She was thankful for the help.

## 2024-06-07 NOTE — TELEPHONE ENCOUNTER
Called PT about the following appt and surgery with .    PATs: 06/14/2024 at 01:00 PM    Surgery: 06/20/2024   Arrive: 05:15 AM  Start: 07:30 AM    PT gave a verbal understanding of appt dates, times, and locations.    Surgery packet mailed out 06/07/2024.    MEN

## 2024-06-11 ENCOUNTER — OFFICE VISIT (OUTPATIENT)
Dept: PSYCHIATRY | Facility: HOSPITAL | Age: 59
End: 2024-06-11
Payer: COMMERCIAL

## 2024-06-11 ENCOUNTER — TELEPHONE (OUTPATIENT)
Dept: ONCOLOGY | Facility: CLINIC | Age: 59
End: 2024-06-11
Payer: COMMERCIAL

## 2024-06-11 DIAGNOSIS — F90.9 ATTENTION DEFICIT HYPERACTIVITY DISORDER (ADHD), UNSPECIFIED ADHD TYPE: ICD-10-CM

## 2024-06-11 DIAGNOSIS — F33.42 RECURRENT MAJOR DEPRESSIVE DISORDER, IN FULL REMISSION: Primary | ICD-10-CM

## 2024-06-11 PROCEDURE — 90792 PSYCH DIAG EVAL W/MED SRVCS: CPT | Performed by: NURSE PRACTITIONER

## 2024-06-11 NOTE — TELEPHONE ENCOUNTER
----- Message from Kristan Chawla sent at 6/11/2024  3:11 PM EDT -----  Regarding: FW: see below  Can you please move her out to next week, oncotype won't be back.     Iliana,   Could you pls call her to let her know that.     Thanks  ----- Message -----  From: Natalie See  Sent: 6/10/2024   2:45 PM EDT  To: Kristan Chawla MD  Subject: see below                                        Would it be okay if I tell Paty to schedule back the next Thursday 6/20 since they have been delayed if so I will send to her

## 2024-06-11 NOTE — PROGRESS NOTES
In Person  Provider Location: [facility]    Chief Complaint: Anxiety    Subjective  Patient ID: Sd Man is a 59 y.o. female who presents for initial consultation through the [facility] at the request of No ref. provider found     PHQ-9 Total Score: (P) 5        HPI:  Pt is seen in initial consultation regarding exacerbation of behavioral health sx alongside recent diagnosis of breast cancer, workup pending. Has seen Dr. Chawla for initial consultation, identifying various significant stressors complicating ability to consider need to undergo treatment. Pt is current with [facility], seeing JAMES Rader, for diagnosis of ADHD since 2018. Has been seeing a counselor, Alejandra Parkinson, since 2016 for assistance managing complicated marriage, complex family dynamics. Hx diagnosis PTSD, ADHD.     PHQ 9 is subthreshhold, noting some days of reduced interests, feeling down, sleep disruption, fatigue, and feelings of burden or failure. Pt shares ultimate goals of being able to retire from current job, seeing anticipated chemo as a threat to this goal. Local support is chaotic, and pt would like to move home to [place] for tx, should she pursue this. Struggles to accept additional trauma of cancer and treatment alongside various other stressors and chaos. Sleep reviewed-always a night owl, not a morning person. Often will nap 4 hours after work, up until 12-2 AM, up appx 7:30 AM for work, sleeping until 9:30 on non work days. Sometimes will sleep until 12. Goals sleep scheduled 12-9. Describes this as stress sleep, coming home from work and falling to sleep immediately. Has never been tested for sleep apnea. Unsure if she snores. Sometimes will have headaches. Daytime energy is good, althought reduced as of recently. Confirms thoughts of being better off dead, passive, never with plan or desire to harm self. Denies hx in patient  admission. ADHD hx reviewed; describes being straight A student, loved learning. Describes always suffering from sx of ADHD, specifically considering hyper focus, losing things often, disorganized, leaving iron on, etc. Sx worse with stress, which is specifically challenging now. Is on current treatment for ADHD, acknolwedging well managed sx during periods of stability, sx worse with stress. Continues to see therapist regularly appx once per week to two weeks. Previously medications reviewed: Concentra, eventually becoming less effective. Then other stimulants, Quelbree with mouth sores- become more distracted. Trintellix, stating this is the only AD she has ever been on. Associated factors include challenging interpersonal relationships growing up, significant hopelessness with dissolution of relationship at 24 with intermittent depression since. Describes reducted interests and pleasure, excessive sleep during these periods, while finding closeness to nature, enjoyment of gardening. Strengths include enjoyment of job, close connection to many coworkers. Identifies significant meaning and mastery in this work.     Hesitancy surrounding chemo reviewed; pt endorses concerns for feeling sick, specit is nervous about loss of hair, identifying importance of appeance. Fears nausea, specifically recognizning 9 months of sickness throughout pregnancy. Also mourns inability to be out in sun, having low energy.    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 Concerned regarding financial changes in this.   Tobacco Use: The patient denies current  or previous tobacco use.  Alcohol Use: does not drink  Marijuana/ Other drug Use: denied.    Medical History  Psychiatric History: Hx outpatient; dx with ADHD, PTSD    Family History  Family Psychiatric History: Mother with MDD, grandmother with MDD - very responsive to Prozac. Two daughters with bipolar disorder.   Family Cancer History: Exhusband with metastatic cancer    The following portions of the patient's history were reviewed and updated as appropriate: She  has a past medical history of ADHD, Anxiety, Breast cancer, Depression, Insulin resistance, Metabolic syndrome X, Obesity (11/11/2012), PCOS (polycystic ovarian syndrome), and PTSD (post-traumatic stress disorder).  She  has a past surgical history that includes Oophorectomy (Bilateral, 01/01/2014) and Venous Access Device (Port) (Right, 6/20/2024).  Her family history includes Atrial fibrillation in her father; Hypertension in her father; Melanoma (age of onset: 75) in her mother.  She  reports that she has never smoked. She has never been exposed to tobacco smoke. She has never used smokeless tobacco. She reports that she does not drink alcohol and does not use drugs.  Current Outpatient Medications   Medication Sig Dispense Refill    amphetamine-dextroamphetamine (ADDERALL) 12.5 MG tablet Take 1 tablet by mouth Every Night. 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 PT TO HOLD 48 HOURS PRIOR TO SURGERY      Nirmatrelvir & Ritonavir, 300mg/100mg, (PAXLOVID) Take 3 tablets by mouth 2 (Two) Times a Day for 5 days. 30 tablet 0    Vortioxetine HBr (TRINTELLIX) 20 MG tablet Take 1 tablet by mouth Daily.       No current facility-administered medications for this visit.     Current Outpatient Medications on File Prior to Visit   Medication Sig    amphetamine-dextroamphetamine (ADDERALL) 12.5 MG tablet Take 1 tablet by mouth Every Night. 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 PT TO HOLD 48 HOURS PRIOR TO SURGERY    Vortioxetine HBr (TRINTELLIX) 20 MG tablet Take 1 tablet by mouth Daily.     No current facility-administered medications on file prior to visit.     She has No Known Allergies..    Objective   Mental Status Exam  Appearance:  clean and casually dressed, appropriate  Attitude toward clinician:  cooperative and agreeable   Speech:    Rate:  rapid    Volume:  normal  Motor:  no abnormal movements present  Mood:  accepting, stressed  Affect:  mood congruent  Thought Processes:  tangential  Thought Content:  normal  Suicidal Thoughts:  absent  Homicidal Thoughts:  absent  Perceptual Disturbance: no perceptual disturbance  Attention and Concentration:  limited  Insight and Judgement:  limited  Memory:  memory appears to be intact    Lab Review:   Hospital Outpatient Visit on 06/07/2024   Component Date Value    EF_3D-VOL 06/07/2024 64.0     LV GLOBAL STRAIN  06/07/2024 -19.6     LVIDd 06/07/2024 4.0     LVIDs 06/07/2024 2.28     IVSd 06/07/2024 1.50     LVPWd 06/07/2024 1.30     FS 06/07/2024 42.9     IVS/LVPW 06/07/2024 1.15     ESV(cubed) 06/07/2024 11.9     LV Sys Vol (BSA correcte* 06/07/2024 17.4     EDV(cubed) 06/07/2024 64.0     LV Singh Vol (BSA correct* 06/07/2024 49.5     LV mass(C)d 06/07/2024 209.0     LVOT area 06/07/2024 3.2     LVOT diam 06/07/2024 2.02     EDV(MOD-sp2) 06/07/2024 105.0     EDV(MOD-sp4) 06/07/2024 105.0     ESV(MOD-sp2) 06/07/2024 37.0     ESV(MOD-sp4) 06/07/2024 37.0     SV(MOD-sp2) 06/07/2024 68.0     SV(MOD-sp4) 06/07/2024 68.0     SVi(MOD-SP2) 06/07/2024 32.1     SVi(MOD-SP4) 06/07/2024 32.1     SVi (LVOT) 06/07/2024 22.3     EF(MOD-sp2) 06/07/2024 64.8     EF(MOD-sp4) 06/07/2024 64.8     MV E max sabra 06/07/2024 55.3     MV A max sabra 06/07/2024 81.0     MV dec time 06/07/2024 0.18     MV E/A 06/07/2024 0.68     Pulm A Revs Dur 06/07/2024 0.07     MV A dur  06/07/2024 0.10     LA ESV Index (BP) 06/07/2024 18.1     Med Peak E' Galo 06/07/2024 7.6     Lat Peak E' Galo 06/07/2024 9.8     Avg E/e' ratio 06/07/2024 6.36     SV(LVOT) 06/07/2024 47.2     SV(RVOT) 06/07/2024 48.7     Qp/Qs 06/07/2024 1.03     RV Base 06/07/2024 3.4     RV Mid 06/07/2024 2.41     RV Length 06/07/2024 6.8     TAPSE (>1.6) 06/07/2024 2.7     RV S' 06/07/2024 15.8     Pulm Sys Galo 06/07/2024 64.3     Pulm Singh Galo 06/07/2024 38.1     Pulm S/D 06/07/2024 1.69     Pulm A Revs Galo 06/07/2024 58.7     LV V1 max 06/07/2024 90.4     LV V1 max PG 06/07/2024 3.3     LV V1 mean PG 06/07/2024 2.00     LV V1 VTI 06/07/2024 14.7     Ao pk galo 06/07/2024 106.0     Ao max PG 06/07/2024 4.5     Ao mean PG 06/07/2024 3.0     Ao V2 VTI 06/07/2024 20.2     POLINA(I,D) 06/07/2024 2.34     MV max PG 06/07/2024 3.4     MV mean PG 06/07/2024 1.39     MV V2 VTI 06/07/2024 15.7     MV P1/2t 06/07/2024 54.8     MVA(P1/2t) 06/07/2024 4.0     MVA(VTI) 06/07/2024 3.0     MV dec slope 06/07/2024 353.1     RVOT diam 06/07/2024 2.05     RV V1 max PG 06/07/2024 2.9     RV V1 max 06/07/2024 85.7     RV V1 VTI 06/07/2024 14.7     PA V2 max 06/07/2024 104.5     PA acc time 06/07/2024 0.12     Ao root diam 06/07/2024 3.5     ACS 06/07/2024 2.17     Sinus 06/07/2024 3.2     STJ 06/07/2024 2.9     EF(MOD-bp) 06/07/2024 65.0     Ascending aorta 06/07/2024 3.5     Aortic arch 06/07/2024 2.5     Abdo Ao Diam 06/07/2024 2.2    Lab on 06/06/2024   Component Date Value    WBC 06/06/2024 8.19     RBC 06/06/2024 4.98     Hemoglobin 06/06/2024 15.4     Hematocrit 06/06/2024 44.1     MCV 06/06/2024 88.6     MCH 06/06/2024 30.9     MCHC 06/06/2024 34.9     RDW 06/06/2024 13.0     RDW-SD 06/06/2024 41.9     MPV 06/06/2024 8.5     Platelets 06/06/2024 274     Neutrophil % 06/06/2024 49.5     Lymphocyte % 06/06/2024 38.9     Monocyte % 06/06/2024 8.1     Eosinophil % 06/06/2024 2.4     Basophil % 06/06/2024 0.6     Immature Grans % 06/06/2024 0.5      Neutrophils, Absolute 06/06/2024 4.05     Lymphocytes, Absolute 06/06/2024 3.19 (H)     Monocytes, Absolute 06/06/2024 0.66     Eosinophils, Absolute 06/06/2024 0.20     Basophils, Absolute 06/06/2024 0.05     Immature Grans, Absolute 06/06/2024 0.04     nRBC 06/06/2024 0.0    Hospital Outpatient Visit on 05/23/2024   Component Date Value    Creatinine 05/23/2024 0.80    Lab Requisition on 04/16/2024   Component Date Value    Addendum 2 04/16/2024                      Value:This result contains rich text formatting which cannot be displayed here.    Addendum 04/16/2024                      Value:This result contains rich text formatting which cannot be displayed here.    Case Report 04/16/2024                      Value:Surgical Pathology Report                         Case: [id number]                                  Authorizing Provider:  Nely Jesus MD       Collected:           04/16/2024 12:45 PM          Ordering Location:     [facility]  Received:            04/16/2024 02:55 PM                                 LABORATORY                                                                   Pathologist:           Lynsey Lima MD                                                    Specimens:   1) - Breast, Left, left breast 200, formalin at 1247                                                2) - Axilla, Left, left axilla, formalin at 1301                                           Clinical Information 04/16/2024                      Value:This result contains rich text formatting which cannot be displayed here.    Final Diagnosis 04/16/2024                      Value:This result contains rich text formatting which cannot be displayed here.    Preliminary Diagnosis 04/16/2024                      Value:This result contains rich text formatting which cannot be displayed here.    Synoptic Checklist 04/16/2024                      Value:Breast Biomarker Reporting Template                             BREAST BIOMARKER REPORTING TEMPLATE - All Specimens                            Protocol posted: 12/13/2023                                                           Test(s) Performed:                                     Estrogen Receptor (ER) Status:    Positive (greater than 10% of cells demonstrate nuclear positivity)                                    Percentage of Cells with Nuclear Positivity:    %                                    Average Intensity of Staining:    Strong                                  Test Type:    Food and Drug Administration (FDA) cleared (test / vendor): Crowdx                                  Primary Antibody:    SP1                                  Scoring System:    Kathy                                    Proportion Score:    5                                    Intensity Score:    3                                    Total Kathy Score:    8                                Test(s) Performed:                                     Progesterone Receptor (PgR) Status:    Positive                                    Percentage of Cells with Nuclear Positivity:    31-40%                                    Average Intensity of Staining:    Moderate                                  Test Type:    Food and Drug Administration (FDA) cleared (test / vendor): [employer]dx                                  Primary Antibody:    1E2                                  Scoring System:    Kathy                                    Proportion Score:    4                                    Intensity Score:    2                                    Total Kathy Score:    6                                Test(s) Performed:                                     HER2 by Immunohistochemistry:    Negative (Score 1+)                                  Test Type:    Food and Drug Administration (FDA) cleared (test / vendor): ventana                                  Primary Antibody:    4B5                                 Test(s) Performed:    Ki-67                                  Ki-67 Percentage of Positive Nuclei:    95 %                                 Primary Antibody:    30-9                                Cold Ischemia and Fixation Times:    Meet requirements specified in latest version of the ASCO / CAP Guidelines                                Cold Ischemia Time (minutes):    2 min                               Fixation Time (hours):    8 hours                               Testing Performed on Block Number(s):    1B                                                         METHODS                               Fixative:    Formalin                                Image Analysis:    Not performed       Comment 04/16/2024                      Value:This result contains rich text formatting which cannot be displayed here.    Gross Description 04/16/2024                      Value:This result contains rich text formatting which cannot be displayed here.    Special Stains 04/16/2024                      Value:This result contains rich text formatting which cannot be displayed here.   Labs reviewed    Plan of Care  Pt with grief reaction to new cancer diagnosis, PTSD, challenges consdiering treatment options and resultant QOL disruption. Currently being treated for ADHD, major depressive disorder.  Considered options for treatment, as well as long term QOL goals. Supported FU apt with Dr. Denton Weems to review Onco Dx, treatment recommendations.  At present, pt is current with existing behavioral health team. [facility] this brief period of treatment.  Will FU in one week to review options, strategies for managing expected and concerning SE.    Diagnoses and all orders for this visit:    1. Recurrent major depressive disorder, in full remission (Primary)    2. Attention deficit hyperactivity disorder (ADHD), unspecified ADHD type

## 2024-06-12 ENCOUNTER — TELEPHONE (OUTPATIENT)
Dept: CARDIOLOGY | Facility: CLINIC | Age: 59
End: 2024-06-12

## 2024-06-12 ENCOUNTER — OFFICE VISIT (OUTPATIENT)
Dept: CARDIOLOGY | Facility: CLINIC | Age: 59
End: 2024-06-12
Payer: COMMERCIAL

## 2024-06-12 ENCOUNTER — DOCUMENTATION (OUTPATIENT)
Dept: OTHER | Facility: HOSPITAL | Age: 59
End: 2024-06-12
Payer: COMMERCIAL

## 2024-06-12 ENCOUNTER — TELEPHONE (OUTPATIENT)
Dept: ONCOLOGY | Facility: CLINIC | Age: 59
End: 2024-06-12
Payer: COMMERCIAL

## 2024-06-12 VITALS
WEIGHT: 241 LBS | BODY MASS INDEX: 41.15 KG/M2 | HEIGHT: 64 IN | SYSTOLIC BLOOD PRESSURE: 124 MMHG | RESPIRATION RATE: 18 BRPM | OXYGEN SATURATION: 98 % | HEART RATE: 97 BPM | DIASTOLIC BLOOD PRESSURE: 82 MMHG

## 2024-06-12 DIAGNOSIS — R40.0 DAYTIME SOMNOLENCE: ICD-10-CM

## 2024-06-12 DIAGNOSIS — Z51.81 ENCOUNTER FOR MONITORING CARDIOTOXIC DRUG THERAPY: Primary | ICD-10-CM

## 2024-06-12 DIAGNOSIS — G47.10 HYPERSOMNIA: ICD-10-CM

## 2024-06-12 DIAGNOSIS — E78.5 HYPERLIPIDEMIA LDL GOAL <100: ICD-10-CM

## 2024-06-12 DIAGNOSIS — Z79.899 ENCOUNTER FOR MONITORING CARDIOTOXIC DRUG THERAPY: Primary | ICD-10-CM

## 2024-06-12 DIAGNOSIS — R73.09 ELEVATED GLUCOSE: ICD-10-CM

## 2024-06-12 DIAGNOSIS — R06.83 SNORING: ICD-10-CM

## 2024-06-12 LAB
DX PRELIMINARY: NORMAL
LAB AP CASE REPORT: NORMAL
LAB AP CLINICAL INFORMATION: NORMAL
LAB AP DIAGNOSIS COMMENT: NORMAL
LAB AP SPECIAL STAINS: NORMAL
LAB AP SYNOPTIC CHECKLIST: NORMAL
Lab: NORMAL
PATH REPORT.ADDENDUM SPEC: NORMAL
PATH REPORT.FINAL DX SPEC: NORMAL
PATH REPORT.GROSS SPEC: NORMAL

## 2024-06-12 NOTE — PROGRESS NOTES
Date of Office Visit: 2024  Encounter Provider: Nicolette Syed MD  Place of Service: [facility]  Patient Name: Sd Man  :1965    Chief complaint  Consult requested by Dr. Chawla for cardio oncology care.    History of Present Illness  Patient is a 59-year-old female with history of ADHD, depression, obesity, polycystic ovarian disease, dyslipidemia, elevated glucose, posttraumatic stress disorder diagnosed with left-sided breast cancer 2024.  Subsequent scans did not show metastatic disease.  Ongoing discussion regarding mastectomy versus partial mastectomy with axillary node dissection and also use of neoadjuvant chemotherapy to include possible ACT therapy versus TC therapy.  Patient is considering options and plans to pursue chemotherapy and the summer months in [place] closer to her family/parents if she pursued his chemotherapy.      Baseline echo with ejection fraction 65%, GLS -19.6% with grade 1 diastolic dysfunction trivial valve regurgitation.  There was moderate left ventricular hypertrophy.    Mrs. Significant stresses at home.  She has not been checking her blood pressure consistently.  She snores at night has daytime somnolence has no family history of sleep apnea STOP-BANG score is 3.    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       No facility-administered medications prior to visit.       Allergies as of 2024    (No Known Allergies)     Social History     Socioeconomic  "History    Marital status:    Tobacco Use    Smoking status: Never     Passive exposure: Never    Smokeless tobacco: Never   Vaping Use    Vaping status: Never Used   Substance and Sexual Activity    Alcohol use: Never    Drug use: Never    Sexual activity: Not Currently     Family History   Problem Relation Age of Onset    Melanoma Mother 75        alive currently 79    Atrial fibrillation Father     Hypertension Father      Review of Systems   Constitutional: Negative for chills, fever, weight gain and weight loss.   Cardiovascular:  Negative for leg swelling.   Respiratory:  Positive for snoring. Negative for cough and wheezing.    Hematologic/Lymphatic: Negative for bleeding problem. Does not bruise/bleed easily.   Skin:  Negative for color change.   Musculoskeletal:  Negative for falls, joint pain and myalgias.   Gastrointestinal:  Negative for melena.   Genitourinary:  Negative for hematuria.   Neurological:  Positive for excessive daytime sleepiness.   Psychiatric/Behavioral:  Positive for depression. The patient is nervous/anxious.         Objective:     Vitals:    06/12/24 0916   BP: 124/82   BP Location: Left arm   Patient Position: Sitting   Cuff Size: Large Adult   Pulse: 97   Resp: 18   SpO2: 98%   Weight: 109 kg (241 lb)   Height: 162.6 cm (64\")     Body mass index is 41.37 kg/m².    Vitals reviewed.   Constitutional:       Appearance: Well-developed. Obese.   Eyes:      General: No scleral icterus.        Right eye: No discharge.      Conjunctiva/sclera: Conjunctivae normal.      Pupils: Pupils are equal, round, and reactive to light.   HENT:      Head: Normocephalic.      Nose: Nose normal.   Neck:      Thyroid: No thyromegaly.      Vascular: No JVD.   Pulmonary:      Effort: Pulmonary effort is normal. No respiratory distress.      Breath sounds: Normal breath sounds. No wheezing. No rales.   Cardiovascular:      Normal rate. Regular rhythm. Normal S1. Normal S2.       Murmurs: There is no " "murmur.      No gallop.    Pulses:     Intact distal pulses.      Carotid: 2+ bilaterally.     Radial: 2+ bilaterally.     Femoral: 2+ bilaterally.     Popliteal: 2+ bilaterally.     Dorsalis pedis: 2+ bilaterally.     Posterior tibial: 2+ bilaterally.  Edema:     Peripheral edema absent.   Abdominal:      General: Bowel sounds are normal. There is no distension.      Palpations: Abdomen is soft.      Tenderness: There is no abdominal tenderness. There is no rebound.   Musculoskeletal: Normal range of motion.         General: No tenderness.      Cervical back: Normal range of motion and neck supple. Skin:     General: Skin is warm and dry.      Findings: No erythema or rash.   Neurological:      Mental Status: Alert and oriented to person, place, and time.   Psychiatric:         Behavior: Behavior normal.         Thought Content: Thought content normal.         Judgment: Judgment normal.       Lab Review:   Lab Results - Last 18 Months   Lab Units 06/06/24  0814   WBC 10*3/mm3 8.19   RBC 10*6/mm3 4.98   HEMOGLOBIN g/dL 15.4   HEMATOCRIT % 44.1   MCV fL 88.6   MCH pg 30.9   MCHC g/dL 34.9   RDW % 13.0   PLATELETS 10*3/mm3 274   NEUTROPHIL % % 49.5   LYMPHOCYTE % % 38.9   MONOCYTES % % 8.1   EOSINOPHIL % % 2.4   BASOPHIL % % 0.6   NEUTROS ABS 10*3/mm3 4.05   LYMPHS ABS 10*3/mm3 3.19*   MONOS ABS 10*3/mm3 0.66   EOS ABS 10*3/mm3 0.20   BASOS ABS 10*3/mm3 0.05   RDW-SD fl 41.9   MPV fL 8.5     Lab Results - Last 18 Months   Lab Units 05/23/24  0943   CREATININE mg/dL 0.80     Lab Results - Last 18 Months   Lab Units 05/23/24  0943   CREATININE mg/dL 0.80     No results for input(s): \"CHOL\", \"TRIG\", \"HDL\", \"LDL\", \"VLDL\", \"LDLHDL\" in the last 01333 hours.  No results for input(s): \"PROBNP\" in the last 97166 hours.  No results for input(s): \"TROPONINT\" in the last 29542 hours.  No results for input(s): \"TSH\" in the last 27892 hours.  No results for input(s): \"PROTIME\", \"PTT\" in the last 03077 hours.        ECG 12 " Lead    Date/Time: 6/12/2024 6:41 PM  Performed by: Nicolette Syed MD    Authorized by: Nicoeltte Syed MD  Comparison: not compared with previous ECG   Rhythm: sinus rhythm  Other findings: low voltage    Clinical impression: abnormal EKG        Assessment:       Diagnosis Plan   1. Hyperlipidemia LDL goal <100  Home Sleep Study    Lipid Panel      2. Elevated glucose  Hemoglobin A1c        Plan:       1.  Left-sided breast cancer.  It is anticipated she will receive chemotherapy, likely involving Adriamycin though she will do this in [place] over the summer months and then return for follow-up.  2.  Cardio oncology care.  Will check a home sleep study.  Will check a lipid panel.  Her ESC score is relatively low.  No mention of coronary artery calcification on CT scan.  I also reviewed images and concur.  Continue with risk factor modification.  Reviewed with her that we will need echo follow-up following Adriamycin therapy.  And then repeat echo in 1 year.  Hopefully she will not need radiation therapy.  3.  Probable sleep apnea.  4.  ADHD  5.  History of depression with suicidal ideation.  6.  Dyslipidemia, will check labs  7.  Elevated glucose, probable prediabetes with hemoglobin A1c 6.3 in 2022.  Will recheck labs              Your medication list            Accurate as of June 12, 2024  6:40 PM. If you have any questions, ask your nurse or doctor.                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                Patient is no longer taking -.  I corrected the med list to reflect this.  I did not stop these medications.      Dictated utilizing Dragon dictation

## 2024-06-12 NOTE — TELEPHONE ENCOUNTER
Aguilar Alfaro, can we see if this patient can get a home sleep study this week as she has to go to [place] by 6/22/20224 for chemo

## 2024-06-12 NOTE — TELEPHONE ENCOUNTER
----- Message from Estefania GRANGER sent at 6/12/2024  9:45 AM EDT -----  I understand we moved her to next week bc we were waiting on her oncotype, and we have gotten the results back today.  We need to add her back on tomorrow and she has to be a 2 unit.    Thank you!!

## 2024-06-12 NOTE — PROGRESS NOTES
Oncology Social Work  ..Distress Screening Follow-up    Diagnosis: Left breast invasive ductal carcinoma   Treatment:     Location of Distress Screening: Medical Oncology    Distress Level: 8 (6/6/2024  1:00 PM)    Physical Concerns:    Memory or concentration: Y    Practical Problems:    Treatment decisions: Y  Taking care of others: Y  Work: Y  Finances: Y    Emotional Concerns:    Worry or anxiety: Y  Sadness or depression: Y  Grief or loss: Y  Anger: Y    Family Concerns:    Relationship with children: Y    Spiritual Concerns:    Changes in geovanny or beliefs: Y  Relationship with the sacred: Y     Interventions:     Financial Needs: financial counselor; FMLA/st disability/lt disability    Comments:    Patient is current with [facility] and see's a Psychiatric APRN and therapist there.   Several home stressors.   Patient also had appt with Behavioral Oncology- Rod Miranda yesterday for initial consult    CEDRICK WeaverW, LCSW

## 2024-06-13 ENCOUNTER — OFFICE VISIT (OUTPATIENT)
Dept: ONCOLOGY | Facility: CLINIC | Age: 59
End: 2024-06-13
Payer: COMMERCIAL

## 2024-06-13 VITALS
RESPIRATION RATE: 18 BRPM | WEIGHT: 240 LBS | HEART RATE: 103 BPM | HEIGHT: 64 IN | BODY MASS INDEX: 40.97 KG/M2 | TEMPERATURE: 98.1 F | SYSTOLIC BLOOD PRESSURE: 135 MMHG | OXYGEN SATURATION: 97 % | DIASTOLIC BLOOD PRESSURE: 93 MMHG

## 2024-06-13 DIAGNOSIS — C50.412 MALIGNANT NEOPLASM OF UPPER-OUTER QUADRANT OF LEFT BREAST IN FEMALE, ESTROGEN RECEPTOR POSITIVE: Primary | ICD-10-CM

## 2024-06-13 DIAGNOSIS — Z17.0 MALIGNANT NEOPLASM OF UPPER-OUTER QUADRANT OF LEFT BREAST IN FEMALE, ESTROGEN RECEPTOR POSITIVE: Primary | ICD-10-CM

## 2024-06-13 NOTE — PROGRESS NOTES
Subjective   Sd Man is a 59 y.o. female.  Referred by Dr. Mccain for left breast invasive ductal carcinoma    History of Present Illness     Patient is a 59-year-old postmenopausal lady who presented with a palpable abnormality of the left breast She initially felt and March 2024.  Patient feels like the mass has increased in size since she initially palpated it.  She brought this to the attention of her gynecologist who ordered diagnostic imaging and further workup.    Patient denies any previous abnormal mammograms or breast biopsies.    She has a diagnosis of PCOS and possible diabetes.  Her last normal mammogram prior to the most recent one was in 2015.    Patient reports extremely stressful situation at home with 3 of her daughters having had mental health issues and her ex- with bipolar disorder.  She has been dealing with a lot of stress since 2017.  She currently works as a .  She was working at [employer] in the past but no longer there.    Patient currently sees [facility] and on medications for anxiety and depression.  She also sees a counselor on a regular basis and has an appointment coming up with them.    Her support system is mainly her parents who live in [place] and also she has a few friends here.  During the summer if she were to do chemotherapy she prefers to get that done in [place] and subsequently transferred care back to us.    4/9/2024-bilateral diagnostic mammogram  Breast that heterogeneously dense.  Finding 1 high density irregular mass measuring 20 mm with indistinct margins at 2:00 in the left breast, 11 cm from the nipple.  This indicates the palpable lump in the left breast.  No suspicious abnormalities of the right breast.    Left breast ultrasound  Finding 1.irregular solid mass measuring 20 x 17 x 20 mm in the posterior one third left breast, 2:00, 11 cm from the nipple.  Finding 2.lymph node measures 20 x 9 x 13 mm on the left  breast.  4 mm cortical thickness noted.    Impression  Finding 1.solid mass in the left breast is suspicious, ultrasound-guided biopsy recommended  Finding 2.lymph node in the left breast is suspicious, FNA and possible spring-loaded or limited core biopsy is recommended.    4/16/2024-left breast ultrasound-guided biopsy  1.left breast 2:00-invasive ductal carcinoma  Grade 3  Invasive carcinoma involves multiple tissue cores and measures up to 14 mm  ER +91 to 100% strong  ID +31 to 40% moderate  HER2 negative, 1+  Ki-67 95%    2.lymph node of the left axilla with free-floating fragments of carcinoma, suspicious for metastasis.    5/10/2024-bilateral breast MRI  Biopsy-proven malignancy in the left breast measuring 2.7 cm at 3:00.  No other suspicious findings are seen.  Left axilla lymph node has undergone prior biopsy and shows diffuse cortical thickening suspicious for left axilla lymphadenopathy.  No suspicious abnormalities of the right breast.    5/23/2024-CT of the chest abdomen and pelvis  1.bone island in the right iliac wing, subchondral cyst in the left acetabulum.  2.2 0.4 cm rounded density in the lateral aspect of the left breast with a surgical marker.  2.4 cm left axillary lymph node.  3.12 millimeters subcapsular cyst in the left lobe of the liver, attenuation compatible with serous fluid.  Second likely cyst within the right lobe of the liver measuring 15 mm.  Attenuation compatible with slightly complex fluid.  No grossly suspicious liver lesion identified.  Further imaging of the liver with an ultrasound recommended.  4.no clear evidence of metastatic disease.    5/23/2024-bone scan without any evidence of metastatic disease    Patient presents today to discuss neoadjuvant chemotherapy given the high-grade and high Ki-67 of the tumor although the tumor is strongly ER positive.    She is extremely reluctant to the idea of receiving chemotherapy as she is concerned about feeling sick and she wants  to work for at least 1 year.    Interval history  Oncotype DX recurrence score on the core biopsy returned at 73 with a greater than 32% risk of distant metastasis with AI or tamoxifen alone.  Significant benefit from chemotherapy noted.  Patient presents today to discuss Oncotype DX recurrence score.  She plans to move to [place] next week and wants to get her treatment at the cancer center in [place].  She has contacted them and she has an appointment scheduled with the [place] cancer center on 2024.    The following portions of the patient's history were reviewed and updated as appropriate: allergies, current medications, past family history, past medical history, past social history, past surgical history, and problem list.    Past Medical History:   Diagnosis Date    ADHD     Depression     Obesity 2012    PCOS (polycystic ovarian syndrome)     PTSD (post-traumatic stress disorder)         Past Surgical History:   Procedure Laterality Date    OOPHORECTOMY Bilateral 2014        Family History   Problem Relation Age of Onset    Melanoma Mother 75        alive currently 79    Atrial fibrillation Father     Hypertension Father         Social History     Socioeconomic History    Marital status:    Tobacco Use    Smoking status: Never     Passive exposure: Never    Smokeless tobacco: Never   Vaping Use    Vaping status: Never Used   Substance and Sexual Activity    Alcohol use: Never    Drug use: Never    Sexual activity: Not Currently        OB History    No obstetric history on file.      Age at menarche-14  Age at first live childbirth-32   3 para 3  0  Age at menopause-47  Breast-feeding for 2 to 3 years  Oral contraceptive pill use none  Hormone replacement therapy for 2 years    No Known Allergies         Review of Systems  Review of systems as mentioned HPI otherwise negative    Objective   Blood pressure 135/93, pulse 103, temperature 98.1 °F (36.7 °C), temperature  "source Oral, resp. rate 18, height 162 cm (63.78\"), weight 109 kg (240 lb), SpO2 97%.   Physical Exam  Vitals reviewed.   Constitutional:       Appearance: Normal appearance. She is obese.   HENT:      Nose: Nose normal.      Mouth/Throat:      Pharynx: Oropharynx is clear.   Eyes:      Conjunctiva/sclera: Conjunctivae normal.   Cardiovascular:      Rate and Rhythm: Normal rate and regular rhythm.   Pulmonary:      Effort: Pulmonary effort is normal.   Abdominal:      General: Abdomen is flat.   Musculoskeletal:         General: Normal range of motion.      Cervical back: Normal range of motion.   Skin:     General: Skin is warm.   Neurological:      General: No focal deficit present.      Mental Status: She is alert and oriented to person, place, and time.   Psychiatric:         Mood and Affect: Mood normal.         Behavior: Behavior normal.         Thought Content: Thought content normal.         Judgment: Judgment normal.       Breast Exam: Right breast appears normal on inspection.  No palpable abnormalities of the right breast.  Left breast on inspection no significant abnormalities other than biopsy site changes.  On palpation at 2:00, 10 to 11 cm from the nipple there is a 2.8 x 2.8 cm mass.  There is an easily palpable left axillary lymph node measuring approximately 2 cm.  No cervical or supraclavicular lymphadenopathy.    I have reexamined the patient and the results are consistent with the previously documented exam. Kristan Chawla MD      Hospital Outpatient Visit on 06/07/2024   Component Date Value Ref Range Status    EF_3D-VOL 06/07/2024 64.0  % Final    LV GLOBAL STRAIN  06/07/2024 -19.6  % Final    LVIDd 06/07/2024 4.0  cm Final    LVIDs 06/07/2024 2.28  cm Final    IVSd 06/07/2024 1.50  cm Final    LVPWd 06/07/2024 1.30  cm Final    FS 06/07/2024 42.9  % Final    IVS/LVPW 06/07/2024 1.15  cm Final    ESV(cubed) 06/07/2024 11.9  ml Final    LV Sys Vol (BSA corrected) 06/07/2024 17.4  cm2 Final    " EDV(cubed) 06/07/2024 64.0  ml Final    LV Singh Vol (BSA corrected) 06/07/2024 49.5  cm2 Final    LV mass(C)d 06/07/2024 209.0  grams Final    LVOT area 06/07/2024 3.2  cm2 Final    LVOT diam 06/07/2024 2.02  cm Final    EDV(MOD-sp2) 06/07/2024 105.0  ml Final    EDV(MOD-sp4) 06/07/2024 105.0  ml Final    ESV(MOD-sp2) 06/07/2024 37.0  ml Final    ESV(MOD-sp4) 06/07/2024 37.0  ml Final    SV(MOD-sp2) 06/07/2024 68.0  ml Final    SV(MOD-sp4) 06/07/2024 68.0  ml Final    SVi(MOD-SP2) 06/07/2024 32.1  ml/m2 Final    SVi(MOD-SP4) 06/07/2024 32.1  ml/m2 Final    SVi (LVOT) 06/07/2024 22.3  ml/m2 Final    EF(MOD-sp2) 06/07/2024 64.8  % Final    EF(MOD-sp4) 06/07/2024 64.8  % Final    MV E max galo 06/07/2024 55.3  cm/sec Final    MV A max galo 06/07/2024 81.0  cm/sec Final    MV dec time 06/07/2024 0.18  sec Final    MV E/A 06/07/2024 0.68   Final    Pulm A Revs Dur 06/07/2024 0.07  sec Final    MV A dur 06/07/2024 0.10  sec Final    LA ESV Index (BP) 06/07/2024 18.1  ml/m2 Final    Med Peak E' Galo 06/07/2024 7.6  cm/sec Final    Lat Peak E' Galo 06/07/2024 9.8  cm/sec Final    Avg E/e' ratio 06/07/2024 6.36   Final    SV(LVOT) 06/07/2024 47.2  ml Final    SV(RVOT) 06/07/2024 48.7  ml Final    Qp/Qs 06/07/2024 1.03   Final    RV Base 06/07/2024 3.4  cm Final    RV Mid 06/07/2024 2.41  cm Final    RV Length 06/07/2024 6.8  cm Final    TAPSE (>1.6) 06/07/2024 2.7  cm Final    RV S' 06/07/2024 15.8  cm/sec Final    Pulm Sys Galo 06/07/2024 64.3  cm/sec Final    Pulm Singh Galo 06/07/2024 38.1  cm/sec Final    Pulm S/D 06/07/2024 1.69   Final    Pulm A Revs Galo 06/07/2024 58.7  cm/sec Final    LV V1 max 06/07/2024 90.4  cm/sec Final    LV V1 max PG 06/07/2024 3.3  mmHg Final    LV V1 mean PG 06/07/2024 2.00  mmHg Final    LV V1 VTI 06/07/2024 14.7  cm Final    Ao pk galo 06/07/2024 106.0  cm/sec Final    Ao max PG 06/07/2024 4.5  mmHg Final    Ao mean PG 06/07/2024 3.0  mmHg Final    Ao V2 VTI 06/07/2024 20.2  cm Final    POLINA(I,D)  06/07/2024 2.34  cm2 Final    MV max PG 06/07/2024 3.4  mmHg Final    MV mean PG 06/07/2024 1.39  mmHg Final    MV V2 VTI 06/07/2024 15.7  cm Final    MV P1/2t 06/07/2024 54.8  msec Final    MVA(P1/2t) 06/07/2024 4.0  cm2 Final    MVA(VTI) 06/07/2024 3.0  cm2 Final    MV dec slope 06/07/2024 353.1  cm/sec2 Final    RVOT diam 06/07/2024 2.05  cm Final    RV V1 max PG 06/07/2024 2.9  mmHg Final    RV V1 max 06/07/2024 85.7  cm/sec Final    RV V1 VTI 06/07/2024 14.7  cm Final    PA V2 max 06/07/2024 104.5  cm/sec Final    PA acc time 06/07/2024 0.12  sec Final    Ao root diam 06/07/2024 3.5  cm Final    ACS 06/07/2024 2.17  cm Final    Sinus 06/07/2024 3.2  cm Final    STJ 06/07/2024 2.9  cm Final    EF(MOD-bp) 06/07/2024 65.0  % Final    Ascending aorta 06/07/2024 3.5  cm Final    Aortic arch 06/07/2024 2.5  cm Final    Abdo Ao Diam 06/07/2024 2.2  cm Final   Lab on 06/06/2024   Component Date Value Ref Range Status    WBC 06/06/2024 8.19  3.40 - 10.80 10*3/mm3 Final    RBC 06/06/2024 4.98  3.77 - 5.28 10*6/mm3 Final    Hemoglobin 06/06/2024 15.4  12.0 - 15.9 g/dL Final    Hematocrit 06/06/2024 44.1  34.0 - 46.6 % Final    MCV 06/06/2024 88.6  79.0 - 97.0 fL Final    MCH 06/06/2024 30.9  26.6 - 33.0 pg Final    MCHC 06/06/2024 34.9  31.5 - 35.7 g/dL Final    RDW 06/06/2024 13.0  12.3 - 15.4 % Final    RDW-SD 06/06/2024 41.9  37.0 - 54.0 fl Final    MPV 06/06/2024 8.5  6.0 - 12.0 fL Final    Platelets 06/06/2024 274  140 - 450 10*3/mm3 Final    Neutrophil % 06/06/2024 49.5  42.7 - 76.0 % Final    Lymphocyte % 06/06/2024 38.9  19.6 - 45.3 % Final    Monocyte % 06/06/2024 8.1  5.0 - 12.0 % Final    Eosinophil % 06/06/2024 2.4  0.3 - 6.2 % Final    Basophil % 06/06/2024 0.6  0.0 - 1.5 % Final    Immature Grans % 06/06/2024 0.5  0.0 - 0.5 % Final    Neutrophils, Absolute 06/06/2024 4.05  1.70 - 7.00 10*3/mm3 Final    Lymphocytes, Absolute 06/06/2024 3.19 (H)  0.70 - 3.10 10*3/mm3 Final    Monocytes, Absolute 06/06/2024  0.66  0.10 - 0.90 10*3/mm3 Final    Eosinophils, Absolute 06/06/2024 0.20  0.00 - 0.40 10*3/mm3 Final    Basophils, Absolute 06/06/2024 0.05  0.00 - 0.20 10*3/mm3 Final    Immature Grans, Absolute 06/06/2024 0.04  0.00 - 0.05 10*3/mm3 Final    nRBC 06/06/2024 0.0  0.0 - 0.2 /100 WBC Final   Hospital Outpatient Visit on 05/23/2024   Component Date Value Ref Range Status    Creatinine 05/23/2024 0.80  0.60 - 1.30 mg/dL Final    Serial Number: [id number]:  [id number]        US Liver    Result Date: 6/9/2024  1. Echogenic liver consistent with fatty infiltration. 2. 1.6 cm benign-appearing right hepatic lobe cyst.   This report was finalized on 6/9/2024 10:05 AM by Dr. Moises Preciado M.D on Workstation: [id number]      NM Bone Scan Whole Body    Result Date: 5/28/2024  No scintigraphic evidence of osseous metastatic disease.   This report was finalized on 5/28/2024 2:19 PM by Dr. Arturo Ta M.D on Workstation: [id number]        MRI of the breast images independently reviewed interpreted by me in detail summarized above  CT chest abdomen pelvis and bone scan images independently reviewed and interpreted by me in detail summarized above    6/4/2024-ultrasound of the liver-echogenic liver consistent with fatty infiltration  1.6 cm benign-appearing right hepatic lobe cyst.    Reviewed ultrasound images independently and interpreted them independently.    Assessment & Plan       *Left breast invasive ductal carcinoma  Clinical T2 N1a M0, Anatomic stage IIb, prognostic stage IIa.  The tumor is grade 3, Ki-67 95%, ER +91 to 100% strong, MO +31 to 40% moderate.  We discussed today that typically for ER positive breast cancers in postmenopausal women we would like to proceed with surgery followed by Oncotype DX recurrence goal use to decide on adjuvant chemotherapy needs however in her situation given that she has lymph node positive disease with a high-grade tumor and a high Ki-67 we feel like they would be a  definite benefit from chemotherapy and downstaging the tumor and potentially avoiding axillary dissection if possible.  Due to this reason I recommend that she proceed with neoadjuvant chemotherapy.  Oncotype DX recurrence score of the tumor noted to be 73 with a greater than 32% risk of distant metastasis with AI or tamoxifen alone.  Significant benefit from chemotherapy.  If she were to proceed with chemotherapy she would like to receive chemotherapy at [place] during summer where her parents live.  Given the severely elevated Oncotype DX recurrence score I recommend strongly that she proceed with neoadjuvant dose dense Adriamycin and Cytoxan followed by weekly Taxol.  Subsequently she would need to proceed with surgery.  Port placement plan for 6/20/2024  Patient plans to see her oncologist in [place] on 6/25/2024.  She will require endocrine therapy and most likely adjuvant radiation following completion of chemotherapy and surgery.    *Severe anxiety and suicidal thoughts  She is a current patient of [facility].  She sees a psychiatric nurse practitioner as well as a counselor and has an appointment with them.  Currently on Trintellix 10 mg.  Severe stressors as reported in the HPI.      *Obesity  Class III  BMI 41.5      *Possible diabetes-she will require follow-up with her primary care physician for management of the same.      *Cardiac health-she has seen Dr. Syed and echocardiogram has been obtained which shows an ejection fraction of 65% with a GLS of -19.6%      *[id number] 2024, after she returns from [place].      40 minutes total spent on the encounter on the same day including reviewing the echocardiogram results, reviewing the liver ultrasound independently and interpreting them independently, face-to-face time and documentation on the same day.

## 2024-06-14 ENCOUNTER — PRE-ADMISSION TESTING (OUTPATIENT)
Dept: PREADMISSION TESTING | Facility: HOSPITAL | Age: 59
End: 2024-06-14
Payer: COMMERCIAL

## 2024-06-14 VITALS
OXYGEN SATURATION: 96 % | WEIGHT: 242.7 LBS | HEIGHT: 65 IN | BODY MASS INDEX: 40.44 KG/M2 | RESPIRATION RATE: 18 BRPM | SYSTOLIC BLOOD PRESSURE: 147 MMHG | TEMPERATURE: 98.6 F | DIASTOLIC BLOOD PRESSURE: 90 MMHG | HEART RATE: 96 BPM

## 2024-06-14 LAB
ANION GAP SERPL CALCULATED.3IONS-SCNC: 9.2 MMOL/L (ref 5–15)
BUN SERPL-MCNC: 15 MG/DL (ref 6–20)
BUN/CREAT SERPL: 18.8 (ref 7–25)
CALCIUM SPEC-SCNC: 8.8 MG/DL (ref 8.6–10.5)
CHLORIDE SERPL-SCNC: 102 MMOL/L (ref 98–107)
CO2 SERPL-SCNC: 24.8 MMOL/L (ref 22–29)
CREAT SERPL-MCNC: 0.8 MG/DL (ref 0.57–1)
EGFRCR SERPLBLD CKD-EPI 2021: 86.1 ML/MIN/1.73
GLUCOSE SERPL-MCNC: 133 MG/DL (ref 65–99)
POTASSIUM SERPL-SCNC: 4.4 MMOL/L (ref 3.5–5.2)
SODIUM SERPL-SCNC: 136 MMOL/L (ref 136–145)

## 2024-06-14 PROCEDURE — 36415 COLL VENOUS BLD VENIPUNCTURE: CPT

## 2024-06-14 PROCEDURE — 80048 BASIC METABOLIC PNL TOTAL CA: CPT

## 2024-06-14 NOTE — TELEPHONE ENCOUNTER
Patient called to report.  Her insurance denied sleep study.  They are requesting a Peer to Peer.   Her sleep study is currently scheduled for 6/24/24 but she will be out of town for her chemo this date. / KIERSTEN     Patient can be reached at [phone]

## 2024-06-14 NOTE — DISCHARGE INSTRUCTIONS
Take the following medications the morning of surgery: TRINTELLIX      If you are on prescription narcotic pain medication to control your pain you may also take that medication the morning of surgery.    General Instructions:  Do not eat solid food after midnight the night before surgery.  You may drink clear liquids day of surgery but must stop at least one hour before your hospital arrival time.  It is beneficial for you to have a clear drink that contains carbohydrates the day of surgery.  We suggest a 12 to 20 ounce bottle of Gatorade or Powerade for non-diabetic patients or a 12 to 20 ounce bottle of G2 or Powerade Zero for diabetic patients. (Pediatric patients, are not advised to drink a 12 to 20 ounce carbohydrate drink)    Clear liquids are liquids you can see through.  Nothing red in color.     Plain water                               Sports drinks  Sodas                                   Gelatin (Jell-O)  Fruit juices without pulp such as white grape juice and apple juice  Popsicles that contain no fruit or yogurt  Tea or coffee (no cream or milk added)  Gatorade / Powerade  G2 / Powerade Zero    Infants may have breast milk up to four hours before surgery.  Infants drinking formula may drink formula up to six hours before surgery.   Patients who avoid smoking, chewing tobacco and alcohol for 4 weeks prior to surgery have a reduced risk of post-operative complications.  Quit smoking as many days before surgery as you can.  Do not smoke, use chewing tobacco or drink alcohol the day of surgery.   If applicable bring your C-PAP/ BI-PAP machine in with you to preop day of surgery.  Bring any papers given to you in the doctor’s office.  Wear clean comfortable clothes.  Do not wear contact lenses, false eyelashes or make-up.  Bring a case for your glasses.   Bring crutches or walker if applicable.  Remove all piercings.  Leave jewelry and any other valuables at home.  Hair extensions with metal clips must be  removed prior to surgery.  The Pre-Admission Testing nurse will instruct you to bring medications if unable to obtain an accurate list in Pre-Admission Testing.        If you were given a blood bank ID arm band remember to bring it with you the day of surgery.    Preventing a Surgical Site Infection:  For 2 to 3 days before surgery, avoid shaving with a razor because the razor can irritate skin and make it easier to develop an infection.    Any areas of open skin can increase the risk of a post-operative wound infection by allowing bacteria to enter and travel throughout the body.  Notify your surgeon if you have any skin wounds / rashes even if it is not near the expected surgical site.  The area will need assessed to determine if surgery should be delayed until it is healed.  The night prior to surgery shower using a fresh bar of anti-bacterial soap (such as Dial) and clean washcloth.  Sleep in a clean bed with clean clothing.  Do not allow pets to sleep with you.  Shower on the morning of surgery using a fresh bar of anti-bacterial soap (such as Dial) and clean washcloth.  Dry with a clean towel and dress in clean clothing.  Ask your surgeon if you will be receiving antibiotics prior to surgery.  Make sure you, your family, and all healthcare providers clean their hands with soap and water or an alcohol based hand  before caring for you or your wound.    Day of surgery:  Your arrival time is approximately two hours before your scheduled surgery time.  Upon arrival, a Pre-op nurse and Anesthesiologist will review your health history, obtain vital signs, and answer questions you may have.  The only belongings needed at this time will be a list of your home medications and if applicable your C-PAP/BI-PAP machine.  A Pre-op nurse will start an IV and you may receive medication in preparation for surgery, including something to help you relax.     Please be aware that surgery does come with discomfort.  We  want to make every effort to control your discomfort so please discuss any uncontrolled symptoms with your nurse.   Your doctor will most likely have prescribed pain medications.      If you are going home after surgery you will receive individualized written care instructions before being discharged.  A responsible adult must drive you to and from the hospital on the day of your surgery and ideally stay with you through the night.   .  Discharge prescriptions can be filled by the hospital pharmacy during regular pharmacy hours.  If you are having surgery late in the day/evening your prescription may be e-prescribed to your pharmacy.  Please verify your pharmacy hours or chose a 24 hour pharmacy to avoid not having access to your prescription because your pharmacy has closed for the day.    If you are staying overnight following surgery, you will be transported to your hospital room following the recovery period.  [facility] has all private rooms.    If you have any questions please call Pre-Admission Testing at [phone].  Deductibles and co-payments are collected on the day of service. Please be prepared to pay the required co-pay, deductible or deposit on the day of service as defined by your plan.    Call your surgeon immediately if you experience any of the following symptoms:  Sore Throat  Shortness of Breath or difficulty breathing  Cough  Chills  Body soreness or muscle pain  Headache  Fever  New loss of taste or smell  Do not arrive for your surgery ill.  Your procedure will need to be rescheduled to another time.  You will need to call your physician before the day of surgery to avoid any unnecessary exposure to hospital staff as well as other patients.

## 2024-06-17 ENCOUNTER — TELEPHONE (OUTPATIENT)
Dept: CARDIOLOGY | Facility: CLINIC | Age: 59
End: 2024-06-17
Payer: COMMERCIAL

## 2024-06-17 ENCOUNTER — OFFICE VISIT (OUTPATIENT)
Dept: PSYCHIATRY | Facility: HOSPITAL | Age: 59
End: 2024-06-17
Payer: COMMERCIAL

## 2024-06-17 ENCOUNTER — HOSPITAL ENCOUNTER (OUTPATIENT)
Dept: SLEEP MEDICINE | Facility: HOSPITAL | Age: 59
End: 2024-06-17
Payer: COMMERCIAL

## 2024-06-17 DIAGNOSIS — E78.5 HYPERLIPIDEMIA LDL GOAL <100: ICD-10-CM

## 2024-06-17 DIAGNOSIS — G47.10 HYPERSOMNIA: ICD-10-CM

## 2024-06-17 DIAGNOSIS — R40.0 DAYTIME SOMNOLENCE: ICD-10-CM

## 2024-06-17 DIAGNOSIS — R06.83 SNORING: ICD-10-CM

## 2024-06-17 DIAGNOSIS — R73.09 ELEVATED GLUCOSE: ICD-10-CM

## 2024-06-17 DIAGNOSIS — Z79.899 ENCOUNTER FOR MONITORING CARDIOTOXIC DRUG THERAPY: ICD-10-CM

## 2024-06-17 DIAGNOSIS — Z51.81 ENCOUNTER FOR MONITORING CARDIOTOXIC DRUG THERAPY: ICD-10-CM

## 2024-06-17 DIAGNOSIS — F90.9 ATTENTION DEFICIT HYPERACTIVITY DISORDER (ADHD), UNSPECIFIED ADHD TYPE: Primary | ICD-10-CM

## 2024-06-17 DIAGNOSIS — F33.41 RECURRENT MAJOR DEPRESSIVE DISORDER, IN PARTIAL REMISSION: ICD-10-CM

## 2024-06-17 PROCEDURE — 95806 SLEEP STUDY UNATT&RESP EFFT: CPT

## 2024-06-17 PROCEDURE — 99205 OFFICE O/P NEW HI 60 MIN: CPT | Performed by: NURSE PRACTITIONER

## 2024-06-17 NOTE — TELEPHONE ENCOUNTER
Caller: SHERRY    Relationship: PAT     Best call back number: [phone]        What was the call regarding: DARRIUS UPLOAD EKG TRACING FROM 6/12/24 ASAP AS PT HAS A PROCEDURE 6/18/24

## 2024-06-17 NOTE — PROGRESS NOTES
Support[facility]cology Services  In Person Session    Subjective  Patient ID: Sd aMn is a 59 y.o. female is seen face to face in the [facility] ([facility].    CC: Grief surrounding need to initiate treatment    HPI/ Interval History:   Pt is seen in follow up regarding grief and distress surrounding decision to initiate treatment for breast cancer. Pt identifies concern for nausea and hair loss, questioning ability to have a wig before she loses her hair. Pt plans to leave for [place] on Saturday to pursue chemotherapy, planning to remain there through September with hopes to return to job at that time. Pt states there is a peer support network in [place] and is eager to participate in this.    Mood and anxiety sx are stable. Pt denies SI, and states she has never been suicidal and does not want to harm self. ADHD is managed, despite recognition of increase in losing things, disorganization alongside current stress. Continues on Azstayys, adderall per psych APRN through [facility], Clint Rahill. Plans to call for one more refill prior to move to [place], unsure of how to continue meds during time away. Otherwise remains very interested in transitioning behavioral health care to behav onc team throughout cancer dx and treatment with goals of returning to [place] at completion.    Pt continues to mourn various interpersonal stressors, health complexities. Recognizes increased blood glucose (A1C pending), hypertension. Sees mgmt of various factors of health as being important.    Exam: As above    Recent Labs Reviewed:  Pre-Admission Testing on 06/14/2024   Component Date Value    Glucose 06/14/2024 133 (H)     BUN 06/14/2024 15     Creatinine 06/14/2024 0.80     Sodium 06/14/2024 136     Potassium 06/14/2024 4.4     Chloride 06/14/2024 102     CO2 06/14/2024 24.8     Calcium 06/14/2024 8.8     BUN/Creatinine Ratio 06/14/2024 18.8     Anion Gap 06/14/2024 9.2     eGFR  06/14/2024 86.1    Hospital Outpatient Visit on 06/07/2024   Component Date Value    EF_3D-VOL 06/07/2024 64.0     LV GLOBAL STRAIN  06/07/2024 -19.6     LVIDd 06/07/2024 4.0     LVIDs 06/07/2024 2.28     IVSd 06/07/2024 1.50     LVPWd 06/07/2024 1.30     FS 06/07/2024 42.9     IVS/LVPW 06/07/2024 1.15     ESV(cubed) 06/07/2024 11.9     LV Sys Vol (BSA correcte* 06/07/2024 17.4     EDV(cubed) 06/07/2024 64.0     LV Singh Vol (BSA correct* 06/07/2024 49.5     LV mass(C)d 06/07/2024 209.0     LVOT area 06/07/2024 3.2     LVOT diam 06/07/2024 2.02     EDV(MOD-sp2) 06/07/2024 105.0     EDV(MOD-sp4) 06/07/2024 105.0     ESV(MOD-sp2) 06/07/2024 37.0     ESV(MOD-sp4) 06/07/2024 37.0     SV(MOD-sp2) 06/07/2024 68.0     SV(MOD-sp4) 06/07/2024 68.0     SVi(MOD-SP2) 06/07/2024 32.1     SVi(MOD-SP4) 06/07/2024 32.1     SVi (LVOT) 06/07/2024 22.3     EF(MOD-sp2) 06/07/2024 64.8     EF(MOD-sp4) 06/07/2024 64.8     MV E max galo 06/07/2024 55.3     MV A max galo 06/07/2024 81.0     MV dec time 06/07/2024 0.18     MV E/A 06/07/2024 0.68     Pulm A Revs Dur 06/07/2024 0.07     MV A dur 06/07/2024 0.10     LA ESV Index (BP) 06/07/2024 18.1     Med Peak E' Galo 06/07/2024 7.6     Lat Peak E' Galo 06/07/2024 9.8     Avg E/e' ratio 06/07/2024 6.36     SV(LVOT) 06/07/2024 47.2     SV(RVOT) 06/07/2024 48.7     Qp/Qs 06/07/2024 1.03     RV Base 06/07/2024 3.4     RV Mid 06/07/2024 2.41     RV Length 06/07/2024 6.8     TAPSE (>1.6) 06/07/2024 2.7     RV S' 06/07/2024 15.8     Pulm Sys Galo 06/07/2024 64.3     Pulm Singh Galo 06/07/2024 38.1     Pulm S/D 06/07/2024 1.69     Pulm A Revs Galo 06/07/2024 58.7     LV V1 max 06/07/2024 90.4     LV V1 max PG 06/07/2024 3.3     LV V1 mean PG 06/07/2024 2.00     LV V1 VTI 06/07/2024 14.7     Ao pk galo 06/07/2024 106.0     Ao max PG 06/07/2024 4.5     Ao mean PG 06/07/2024 3.0     Ao V2 VTI 06/07/2024 20.2     POLINA(I,D) 06/07/2024 2.34     MV max PG 06/07/2024 3.4     MV mean PG 06/07/2024 1.39     MV V2 VTI  06/07/2024 15.7     MV P1/2t 06/07/2024 54.8     MVA(P1/2t) 06/07/2024 4.0     MVA(VTI) 06/07/2024 3.0     MV dec slope 06/07/2024 353.1     RVOT diam 06/07/2024 2.05     RV V1 max PG 06/07/2024 2.9     RV V1 max 06/07/2024 85.7     RV V1 VTI 06/07/2024 14.7     PA V2 max 06/07/2024 104.5     PA acc time 06/07/2024 0.12     Ao root diam 06/07/2024 3.5     ACS 06/07/2024 2.17     Sinus 06/07/2024 3.2     STJ 06/07/2024 2.9     EF(MOD-bp) 06/07/2024 65.0     Ascending aorta 06/07/2024 3.5     Aortic arch 06/07/2024 2.5     Abdo Ao Diam 06/07/2024 2.2    Lab on 06/06/2024   Component Date Value    WBC 06/06/2024 8.19     RBC 06/06/2024 4.98     Hemoglobin 06/06/2024 15.4     Hematocrit 06/06/2024 44.1     MCV 06/06/2024 88.6     MCH 06/06/2024 30.9     MCHC 06/06/2024 34.9     RDW 06/06/2024 13.0     RDW-SD 06/06/2024 41.9     MPV 06/06/2024 8.5     Platelets 06/06/2024 274     Neutrophil % 06/06/2024 49.5     Lymphocyte % 06/06/2024 38.9     Monocyte % 06/06/2024 8.1     Eosinophil % 06/06/2024 2.4     Basophil % 06/06/2024 0.6     Immature Grans % 06/06/2024 0.5     Neutrophils, Absolute 06/06/2024 4.05     Lymphocytes, Absolute 06/06/2024 3.19 (H)     Monocytes, Absolute 06/06/2024 0.66     Eosinophils, Absolute 06/06/2024 0.20     Basophils, Absolute 06/06/2024 0.05     Immature Grans, Absolute 06/06/2024 0.04     nRBC 06/06/2024 0.0    Hospital Outpatient Visit on 05/23/2024   Component Date Value    Creatinine 05/23/2024 0.80    Labs reviewed; blood glucose elevated. A1C and lipid panel pending.    Current Psychotropic Medications:  Adderall 12.5 q day  Astarys 39.2-7/8 mg daily  Trintellix 20 mg daily    Plan of Care/ Medical Decision Making  Discussed with patient understanding of my inability to provide care or services while pt not in [place]. Pt VU.   At present, I recommend continuing medications as written. Pt is aware she may need to find behav health provider to continue stimulants.  Pt is agreeable to  connecting to peer support network in [place].  Discussed consideration of olanzapine 5 mg q hs on days surrounding chemo, exploring benefit to nausea, appetite, sleep, and any disruption in behavioral health sx. Metabolic impact discussed at length, with importance of understanding and treating any elevation in A1C, lipids, BP, etc.  Assisted patient with connection to wig room, discussed [employer] for additional questions or needs.  No follow up has been arranged at this time. Pt agrees to schedule FU once back in [place].  Pt has provided me with JONNY to speak with existing APRN, Everardo Vora, regarding plan of care.    Diagnoses and all orders for this visit:    1. Attention deficit hyperactivity disorder (ADHD), unspecified ADHD type (Primary)    2. Recurrent major depressive disorder, in partial remission    I spent 44 minutes caring for Sd on this date of service. This time includes time spent by me in the following activities: preparing for the visit, reviewing tests, obtaining and/or reviewing a separately obtained history, performing a medically appropriate examination and/or evaluation, counseling and educating the patient/family/caregiver, ordering medications, tests, or procedures, referring and communicating with other health care professionals, documenting information in the medical record, and care coordination.

## 2024-06-17 NOTE — TELEPHONE ENCOUNTER
Sent via STAT scan.  Email sent to have this uploaded asap.  Info to PAT to let Keri know.  (Done)

## 2024-06-20 ENCOUNTER — APPOINTMENT (OUTPATIENT)
Dept: GENERAL RADIOLOGY | Facility: HOSPITAL | Age: 59
End: 2024-06-20
Payer: COMMERCIAL

## 2024-06-20 ENCOUNTER — TELEPHONE (OUTPATIENT)
Dept: CARDIOLOGY | Facility: CLINIC | Age: 59
End: 2024-06-20
Payer: COMMERCIAL

## 2024-06-20 ENCOUNTER — HOSPITAL ENCOUNTER (OUTPATIENT)
Facility: HOSPITAL | Age: 59
Setting detail: HOSPITAL OUTPATIENT SURGERY
Discharge: HOME | End: 2024-06-20
Attending: STUDENT IN AN ORGANIZED HEALTH CARE EDUCATION/TRAINING PROGRAM | Admitting: STUDENT IN AN ORGANIZED HEALTH CARE EDUCATION/TRAINING PROGRAM
Payer: COMMERCIAL

## 2024-06-20 ENCOUNTER — ANESTHESIA EVENT (OUTPATIENT)
Dept: PERIOP | Facility: HOSPITAL | Age: 59
End: 2024-06-20
Payer: COMMERCIAL

## 2024-06-20 ENCOUNTER — ANESTHESIA (OUTPATIENT)
Dept: PERIOP | Facility: HOSPITAL | Age: 59
End: 2024-06-20
Payer: COMMERCIAL

## 2024-06-20 VITALS
BODY MASS INDEX: 40.4 KG/M2 | HEIGHT: 65 IN | SYSTOLIC BLOOD PRESSURE: 124 MMHG | OXYGEN SATURATION: 100 % | RESPIRATION RATE: 16 BRPM | TEMPERATURE: 98.2 F | DIASTOLIC BLOOD PRESSURE: 79 MMHG | WEIGHT: 242.51 LBS | HEART RATE: 89 BPM

## 2024-06-20 DIAGNOSIS — Z17.0 MALIGNANT NEOPLASM OF UPPER-OUTER QUADRANT OF LEFT BREAST IN FEMALE, ESTROGEN RECEPTOR POSITIVE: ICD-10-CM

## 2024-06-20 DIAGNOSIS — C50.412 MALIGNANT NEOPLASM OF UPPER-OUTER QUADRANT OF LEFT BREAST IN FEMALE, ESTROGEN RECEPTOR POSITIVE: ICD-10-CM

## 2024-06-20 PROCEDURE — 76937 US GUIDE VASCULAR ACCESS: CPT | Performed by: STUDENT IN AN ORGANIZED HEALTH CARE EDUCATION/TRAINING PROGRAM

## 2024-06-20 PROCEDURE — 77001 FLUOROGUIDE FOR VEIN DEVICE: CPT | Performed by: STUDENT IN AN ORGANIZED HEALTH CARE EDUCATION/TRAINING PROGRAM

## 2024-06-20 PROCEDURE — 25010000002 MIDAZOLAM PER 1 MG: Performed by: ANESTHESIOLOGY

## 2024-06-20 PROCEDURE — 25010000002 BUPIVACAINE (PF) 0.5 % SOLUTION 10 ML VIAL: Performed by: STUDENT IN AN ORGANIZED HEALTH CARE EDUCATION/TRAINING PROGRAM

## 2024-06-20 PROCEDURE — 25010000002 KETOROLAC TROMETHAMINE PER 15 MG: Performed by: NURSE ANESTHETIST, CERTIFIED REGISTERED

## 2024-06-20 PROCEDURE — 36561 INSERT TUNNELED CV CATH: CPT | Performed by: STUDENT IN AN ORGANIZED HEALTH CARE EDUCATION/TRAINING PROGRAM

## 2024-06-20 PROCEDURE — 25810000003 LACTATED RINGERS PER 1000 ML: Performed by: NURSE ANESTHETIST, CERTIFIED REGISTERED

## 2024-06-20 PROCEDURE — 25810000003 LACTATED RINGERS PER 1000 ML: Performed by: ANESTHESIOLOGY

## 2024-06-20 PROCEDURE — 25010000002 HEPARIN (PORCINE) PER 1000 UNITS: Performed by: STUDENT IN AN ORGANIZED HEALTH CARE EDUCATION/TRAINING PROGRAM

## 2024-06-20 PROCEDURE — 25010000002 CEFAZOLIN PER 500 MG: Performed by: STUDENT IN AN ORGANIZED HEALTH CARE EDUCATION/TRAINING PROGRAM

## 2024-06-20 PROCEDURE — 76000 FLUOROSCOPY <1 HR PHYS/QHP: CPT

## 2024-06-20 PROCEDURE — 25010000002 PROPOFOL 200 MG/20ML EMULSION: Performed by: NURSE ANESTHETIST, CERTIFIED REGISTERED

## 2024-06-20 PROCEDURE — 25010000002 FENTANYL CITRATE (PF) 50 MCG/ML SOLUTION: Performed by: NURSE ANESTHETIST, CERTIFIED REGISTERED

## 2024-06-20 PROCEDURE — C1788 PORT, INDWELLING, IMP: HCPCS | Performed by: STUDENT IN AN ORGANIZED HEALTH CARE EDUCATION/TRAINING PROGRAM

## 2024-06-20 DEVICE — POWERPORT CLEARVUE IMPLANTABLE PORT WITH ATTACHABLE 8F POLYURETHANE OPEN-ENDED SINGLE-LUMEN VENOUS CATHETER INTERMEDIATE KIT
Type: IMPLANTABLE DEVICE | Site: CHEST | Status: FUNCTIONAL
Brand: POWERPORT CLEARVUE

## 2024-06-20 RX ORDER — FENTANYL CITRATE 50 UG/ML
50 INJECTION, SOLUTION INTRAMUSCULAR; INTRAVENOUS ONCE AS NEEDED
Status: DISCONTINUED | OUTPATIENT
Start: 2024-06-20 | End: 2024-06-20 | Stop reason: HOSPADM

## 2024-06-20 RX ORDER — SODIUM CHLORIDE 0.9 % (FLUSH) 0.9 %
3-10 SYRINGE (ML) INJECTION AS NEEDED
Status: DISCONTINUED | OUTPATIENT
Start: 2024-06-20 | End: 2024-06-20 | Stop reason: HOSPADM

## 2024-06-20 RX ORDER — HYDRALAZINE HYDROCHLORIDE 20 MG/ML
5 INJECTION INTRAMUSCULAR; INTRAVENOUS
Status: DISCONTINUED | OUTPATIENT
Start: 2024-06-20 | End: 2024-06-20 | Stop reason: HOSPADM

## 2024-06-20 RX ORDER — IPRATROPIUM BROMIDE AND ALBUTEROL SULFATE 2.5; .5 MG/3ML; MG/3ML
3 SOLUTION RESPIRATORY (INHALATION) ONCE AS NEEDED
Status: DISCONTINUED | OUTPATIENT
Start: 2024-06-20 | End: 2024-06-20 | Stop reason: HOSPADM

## 2024-06-20 RX ORDER — FENTANYL CITRATE 50 UG/ML
50 INJECTION, SOLUTION INTRAMUSCULAR; INTRAVENOUS
Status: DISCONTINUED | OUTPATIENT
Start: 2024-06-20 | End: 2024-06-20 | Stop reason: HOSPADM

## 2024-06-20 RX ORDER — LABETALOL HYDROCHLORIDE 5 MG/ML
5 INJECTION, SOLUTION INTRAVENOUS
Status: DISCONTINUED | OUTPATIENT
Start: 2024-06-20 | End: 2024-06-20 | Stop reason: HOSPADM

## 2024-06-20 RX ORDER — PROPOFOL 10 MG/ML
INJECTION, EMULSION INTRAVENOUS AS NEEDED
Status: DISCONTINUED | OUTPATIENT
Start: 2024-06-20 | End: 2024-06-20 | Stop reason: SURG

## 2024-06-20 RX ORDER — FENTANYL CITRATE 50 UG/ML
INJECTION, SOLUTION INTRAMUSCULAR; INTRAVENOUS AS NEEDED
Status: DISCONTINUED | OUTPATIENT
Start: 2024-06-20 | End: 2024-06-20 | Stop reason: SURG

## 2024-06-20 RX ORDER — LIDOCAINE HYDROCHLORIDE 10 MG/ML
0.5 INJECTION, SOLUTION INFILTRATION; PERINEURAL ONCE AS NEEDED
Status: DISCONTINUED | OUTPATIENT
Start: 2024-06-20 | End: 2024-06-20 | Stop reason: HOSPADM

## 2024-06-20 RX ORDER — SODIUM CHLORIDE 0.9 % (FLUSH) 0.9 %
3 SYRINGE (ML) INJECTION EVERY 12 HOURS SCHEDULED
Status: DISCONTINUED | OUTPATIENT
Start: 2024-06-20 | End: 2024-06-20 | Stop reason: HOSPADM

## 2024-06-20 RX ORDER — DROPERIDOL 2.5 MG/ML
0.62 INJECTION, SOLUTION INTRAMUSCULAR; INTRAVENOUS
Status: DISCONTINUED | OUTPATIENT
Start: 2024-06-20 | End: 2024-06-20 | Stop reason: HOSPADM

## 2024-06-20 RX ORDER — SODIUM CHLORIDE, SODIUM LACTATE, POTASSIUM CHLORIDE, CALCIUM CHLORIDE 600; 310; 30; 20 MG/100ML; MG/100ML; MG/100ML; MG/100ML
INJECTION, SOLUTION INTRAVENOUS CONTINUOUS PRN
Status: DISCONTINUED | OUTPATIENT
Start: 2024-06-20 | End: 2024-06-20 | Stop reason: SURG

## 2024-06-20 RX ORDER — FAMOTIDINE 10 MG/ML
20 INJECTION, SOLUTION INTRAVENOUS ONCE
Status: COMPLETED | OUTPATIENT
Start: 2024-06-20 | End: 2024-06-20

## 2024-06-20 RX ORDER — OXYCODONE AND ACETAMINOPHEN 7.5; 325 MG/1; MG/1
1 TABLET ORAL EVERY 4 HOURS PRN
Status: DISCONTINUED | OUTPATIENT
Start: 2024-06-20 | End: 2024-06-20 | Stop reason: HOSPADM

## 2024-06-20 RX ORDER — PROMETHAZINE HYDROCHLORIDE 25 MG/1
25 SUPPOSITORY RECTAL ONCE AS NEEDED
Status: DISCONTINUED | OUTPATIENT
Start: 2024-06-20 | End: 2024-06-20 | Stop reason: HOSPADM

## 2024-06-20 RX ORDER — SODIUM CHLORIDE, SODIUM LACTATE, POTASSIUM CHLORIDE, CALCIUM CHLORIDE 600; 310; 30; 20 MG/100ML; MG/100ML; MG/100ML; MG/100ML
9 INJECTION, SOLUTION INTRAVENOUS CONTINUOUS
Status: DISCONTINUED | OUTPATIENT
Start: 2024-06-20 | End: 2024-06-20 | Stop reason: HOSPADM

## 2024-06-20 RX ORDER — MIDAZOLAM HYDROCHLORIDE 1 MG/ML
1 INJECTION INTRAMUSCULAR; INTRAVENOUS
Status: DISCONTINUED | OUTPATIENT
Start: 2024-06-20 | End: 2024-06-20 | Stop reason: HOSPADM

## 2024-06-20 RX ORDER — HYDROMORPHONE HYDROCHLORIDE 1 MG/ML
0.5 INJECTION, SOLUTION INTRAMUSCULAR; INTRAVENOUS; SUBCUTANEOUS
Status: DISCONTINUED | OUTPATIENT
Start: 2024-06-20 | End: 2024-06-20 | Stop reason: HOSPADM

## 2024-06-20 RX ORDER — PROMETHAZINE HYDROCHLORIDE 25 MG/1
25 TABLET ORAL ONCE AS NEEDED
Status: DISCONTINUED | OUTPATIENT
Start: 2024-06-20 | End: 2024-06-20 | Stop reason: HOSPADM

## 2024-06-20 RX ORDER — HYDROCODONE BITARTRATE AND ACETAMINOPHEN 5; 325 MG/1; MG/1
1 TABLET ORAL ONCE AS NEEDED
Status: DISCONTINUED | OUTPATIENT
Start: 2024-06-20 | End: 2024-06-20 | Stop reason: HOSPADM

## 2024-06-20 RX ORDER — DIPHENHYDRAMINE HYDROCHLORIDE 50 MG/ML
12.5 INJECTION INTRAMUSCULAR; INTRAVENOUS
Status: DISCONTINUED | OUTPATIENT
Start: 2024-06-20 | End: 2024-06-20 | Stop reason: HOSPADM

## 2024-06-20 RX ORDER — FLUMAZENIL 0.1 MG/ML
0.2 INJECTION INTRAVENOUS AS NEEDED
Status: DISCONTINUED | OUTPATIENT
Start: 2024-06-20 | End: 2024-06-20 | Stop reason: HOSPADM

## 2024-06-20 RX ORDER — KETOROLAC TROMETHAMINE 30 MG/ML
INJECTION, SOLUTION INTRAMUSCULAR; INTRAVENOUS AS NEEDED
Status: DISCONTINUED | OUTPATIENT
Start: 2024-06-20 | End: 2024-06-20 | Stop reason: SURG

## 2024-06-20 RX ORDER — ONDANSETRON 2 MG/ML
4 INJECTION INTRAMUSCULAR; INTRAVENOUS ONCE AS NEEDED
Status: DISCONTINUED | OUTPATIENT
Start: 2024-06-20 | End: 2024-06-20 | Stop reason: HOSPADM

## 2024-06-20 RX ORDER — NALOXONE HCL 0.4 MG/ML
0.2 VIAL (ML) INJECTION AS NEEDED
Status: DISCONTINUED | OUTPATIENT
Start: 2024-06-20 | End: 2024-06-20 | Stop reason: HOSPADM

## 2024-06-20 RX ORDER — EPHEDRINE SULFATE 50 MG/ML
5 INJECTION, SOLUTION INTRAVENOUS ONCE AS NEEDED
Status: DISCONTINUED | OUTPATIENT
Start: 2024-06-20 | End: 2024-06-20 | Stop reason: HOSPADM

## 2024-06-20 RX ORDER — LIDOCAINE HYDROCHLORIDE 20 MG/ML
INJECTION, SOLUTION EPIDURAL; INFILTRATION; INTRACAUDAL; PERINEURAL AS NEEDED
Status: DISCONTINUED | OUTPATIENT
Start: 2024-06-20 | End: 2024-06-20 | Stop reason: SURG

## 2024-06-20 RX ADMIN — PROPOFOL 30 MG: 10 INJECTION, EMULSION INTRAVENOUS at 08:19

## 2024-06-20 RX ADMIN — PROPOFOL 100 MCG/KG/MIN: 10 INJECTION, EMULSION INTRAVENOUS at 07:54

## 2024-06-20 RX ADMIN — FENTANYL CITRATE 25 MCG: 50 INJECTION, SOLUTION INTRAMUSCULAR; INTRAVENOUS at 07:55

## 2024-06-20 RX ADMIN — PROPOFOL 50 MG: 10 INJECTION, EMULSION INTRAVENOUS at 07:55

## 2024-06-20 RX ADMIN — SODIUM CHLORIDE, POTASSIUM CHLORIDE, SODIUM LACTATE AND CALCIUM CHLORIDE 9 ML/HR: 600; 310; 30; 20 INJECTION, SOLUTION INTRAVENOUS at 06:45

## 2024-06-20 RX ADMIN — SODIUM CHLORIDE 2000 MG: 900 INJECTION INTRAVENOUS at 07:39

## 2024-06-20 RX ADMIN — LIDOCAINE HYDROCHLORIDE 60 MG: 20 INJECTION, SOLUTION EPIDURAL; INFILTRATION; INTRACAUDAL; PERINEURAL at 07:53

## 2024-06-20 RX ADMIN — PROPOFOL 100 MG: 10 INJECTION, EMULSION INTRAVENOUS at 07:53

## 2024-06-20 RX ADMIN — FENTANYL CITRATE 25 MCG: 50 INJECTION, SOLUTION INTRAMUSCULAR; INTRAVENOUS at 07:53

## 2024-06-20 RX ADMIN — KETOROLAC TROMETHAMINE 30 MG: 30 INJECTION, SOLUTION INTRAMUSCULAR at 08:34

## 2024-06-20 RX ADMIN — SODIUM CHLORIDE, POTASSIUM CHLORIDE, SODIUM LACTATE AND CALCIUM CHLORIDE: 600; 310; 30; 20 INJECTION, SOLUTION INTRAVENOUS at 07:47

## 2024-06-20 RX ADMIN — MIDAZOLAM 1 MG: 1 INJECTION INTRAMUSCULAR; INTRAVENOUS at 06:45

## 2024-06-20 RX ADMIN — FAMOTIDINE 20 MG: 10 INJECTION INTRAVENOUS at 06:45

## 2024-06-20 RX ADMIN — PROPOFOL 30 MG: 10 INJECTION, EMULSION INTRAVENOUS at 08:18

## 2024-06-20 NOTE — ANESTHESIA POSTPROCEDURE EVALUATION
"Patient: Sd Man    Procedure Summary       Date: 06/20/24 Room / Location: [facility] OR [address] [place] [address] MAIN OR    Anesthesia Start: 0747 Anesthesia Stop: 0855    Procedure: INSERTION VENOUS ACCESS DEVICE (Right) Diagnosis:       Malignant neoplasm of upper-outer quadrant of left breast in female, estrogen receptor positive      (Malignant neoplasm of upper-outer quadrant of left breast in female, estrogen receptor positive [C50.412, Z17.0])    Surgeons: Moira Mccain MD Provider: Candy Tyler MD    Anesthesia Type: general, MAC ASA Status: 2            Anesthesia Type: general, MAC    Vitals  Vitals Value Taken Time   /65 06/20/24 0915   Temp     Pulse 62 06/20/24 0923   Resp 16 06/20/24 0900   SpO2 100 % 06/20/24 0923   Vitals shown include unfiled device data.        Post Anesthesia Care and Evaluation    Patient location during evaluation: PACU  Patient participation: complete - patient participated  Level of consciousness: awake and alert  Pain management: adequate    Airway patency: patent  Anesthetic complications: No anesthetic complications  PONV Status: controlled  Cardiovascular status: acceptable and hemodynamically stable  Respiratory status: acceptable  Hydration status: acceptable    Comments: /79 (BP Location: Right arm, Patient Position: Lying)   Pulse 89   Temp 36.8 °C (98.2 °F) (Oral)   Resp 16   Ht 165 cm (64.96\")   Wt 110 kg (242 lb 8.1 oz)   LMP  (LMP Unknown)   SpO2 100%   BMI 40.40 kg/m²     "

## 2024-06-20 NOTE — INTERVAL H&P NOTE
H&P reviewed. The patient was examined and there are no changes to the H&P.      Port today for neoadjuvant chemotherapy    Moira Mccain MD

## 2024-06-20 NOTE — ANESTHESIA PREPROCEDURE EVALUATION
" Anesthesia Evaluation     Patient summary reviewed and Nursing notes reviewed                Airway   Mallampati: II  Dental - normal exam     Pulmonary - negative pulmonary ROS   Cardiovascular     ECG reviewed  Rhythm: regular    (+) hyperlipidemia    ROS comment: Baseline echo with ejection fraction 65%, GLS -19.6% with grade 1 diastolic dysfunction trivial valve regurgitation.  There was moderate left ventricular hypertrophy.    sinus rhythm    Neuro/Psych  (+) psychiatric history Anxiety and PTSD  GI/Hepatic/Renal/Endo    (+) obesity, morbid obesity    ROS Comment: Pcos  Metabolic syndrome      Musculoskeletal (-) negative ROS    Abdominal   (+) obese   Substance History - negative use     OB/GYN negative ob/gyn ROS         Other      history of cancer                  Anesthesia Plan    ASA 2     general and MAC     (/90 (BP Location: Right arm, Patient Position: Lying)   Pulse 90   Temp 36.8 °C (98.2 °F) (Oral)   Resp 20   Ht 165 cm (64.96\")   Wt 110 kg (242 lb 8.1 oz)   LMP  (LMP Unknown)   SpO2 98%   BMI 40.40 kg/m²     I have reviewed the patient's history with the patient and the chart, including all pertinent laboratory results and imaging. I have explained the risks of anesthesia including but not limited to dental damage, corneal abrasion, nerve injury, MI, stroke, and death.    )  intravenous induction     Anesthetic plan, risks, benefits, and alternatives have been provided, discussed and informed consent has been obtained with: patient.    CODE STATUS:         "

## 2024-06-20 NOTE — OP NOTE
INSERTION VENOUS ACCESS DEVICE  Procedure Report    Patient Name:  Sd Man  YOB: 1965    Date of Surgery:  6/20/2024     Indications:  breast cancer    Pre-op Diagnosis:   Malignant neoplasm of upper-outer quadrant of left breast in female, estrogen receptor positive [C50.412, Z17.0]       Post-Op Diagnosis Codes:     * Malignant neoplasm of upper-outer quadrant of left breast in female, estrogen receptor positive [C50.412, Z17.0]      Procedure(s):  INSERTION VENOUS ACCESS DEVICE      Staff:  Surgeon(s):  Moira Mccain MD      Anesthesia: Monitored Anesthesia Care    Estimated Blood Loss: minimal    Implants:    Implant Name Type Inv. Item Serial No.  Lot No. LRB No. Used Action   KT PORT CLEARVUE POWERPORT ISP W/8F POLY CATH - [id number] Implant KT PORT CLEARVUE POWERPORT ISP W/8F POLY CATH  [employer] [id number] 1 Implanted       Specimen:          None        Findings: right port placement    Complications: none    Description of Procedure:   Once adequate anesthesia had been achieved by the anesthesia team, the upper anterior chest and both sides of the neck were prepped and draped in the usual sterile fashion. A preoperative time out confirmed the correct patient and procedure. The skin and subcutaneous tissue on the right upper chest/neck was infiltrated with Lidocaine 1% and Marcaine 0.5% in a 50/50 mixture (with epinephrine). Initially the subclavian vein was attempted to access but unable to cannulate. Then under ultrasound guidance the right internal jugular vein was accessed on the initial attempt with an 18 gauge needle and a guide wire was placed without difficulty utilizing a Seldinger technique.  C-arm fluoroscopy confirmed the proper position of the guide wire in the superior vena cava.  A 3 cm incision was made in the anterior chest wall and a pocket was created for the port.  The catheter was irrigated with heparinized saline, cut to length (24  cm) and secured to the port.  Over the guide wire, the vein dilator and the peel away sheath were inserted uneventfully.  Position was confirmed with fluoroscopy.  The wire and the vein dilator were then removed and the catheter was tunneled in the peel away sheath.  Good flushing was obtained from the port.  C-arm fluoroscopy confirmed the good position of the tip of the catheter and no kinking. The port was secured with 2-0 Prolene.  The incision was closed in layers with 3-0 vicryl deep dermal and 4-0 monocryl subcuticular. a 4-0 vicryl was used to close the small incision on the neck.    Dermabond was placed over the wounds.  Final instrument counts were correct at the end of the procedure. A post procedure chest xray was ordered to ensure no pneumothorax was present.                  Moira Mccain MD     Date: 6/20/2024  Time: 08:59 EDT

## 2024-06-20 NOTE — DISCHARGE INSTRUCTIONS
FOR PATIENTS WHO HAVE HAD PORT PLACED:   Keep dressing in place and dressings dry for 24 hours.   May then remove dressings and may shower. Surgical Glue will remain over incision.  Blot dry incision with towel.  No tubs, hot tubs, pools.   May use port immediately.  Glue will start to peel in 7-10 days, do not remove, will fall off.     FOR ALL PATIENTS:   Responsible adult to stay with patient at discharge and at least 24 hours after discharge.   Call the office for any of the following: persistent bleeding, excessive bruising or swelling, excessive sleepiness or trouble breathing, severe pain, persistent nausea or vomiting, fever greater than 101.0F     Postop appointment was scheduled in the office preoperatively. If patient cannot find that appointment, please call the office for a reminder on the next business day. [phone].

## 2024-06-20 NOTE — TELEPHONE ENCOUNTER
Please blood pressure readings look quite good.  The lipid panel will need to be done fasting.  I am not sure if that is feasible at the time that she will go to hematology.  If she can do it that is great.  Nonfasting labs however not very helpful.  She was also to get a hemoglobin A1c which can be done whether she is fasting or not.  Please let me know [facility] orders.

## 2024-06-20 NOTE — TELEPHONE ENCOUNTER
Pt called with updated BP Readings beofre her surgery today.    127/87  112/82  139/90  135/92  118/83  113/79  125/88  120/84  126/84  Pulse 80s    She also wanted to let us know that PAT did not draw the lipids as we wanted.  Is this ok to get at next visit with [facility]?

## 2024-06-21 NOTE — TELEPHONE ENCOUNTER
Spoke with pt.  Verbalized understanding.      Pt is going to [place] for chemo.  She leaves tomorrow.  Pt said she will check with her Onco up there is they will draw the lipids.  Pt said she was fasting for the BMP the other day.  She said she has a history of pre-diabetes and was on Metformin in the past with her PCP but pt stopped.  She will get established up there with someone as she plans to finish her chemo up there before coming back to [place].      I will fax the order for lipids up to her Onco.  Pt has appointment on Tuesday and will check to verify they will draw.      Onco is Dr Yost in [place], [place]  Phone: [phone]  Fax: [phone]

## 2024-06-23 ENCOUNTER — DOCUMENTATION (OUTPATIENT)
Dept: ONCOLOGY | Facility: CLINIC | Age: 59
End: 2024-06-23
Payer: COMMERCIAL

## 2024-06-24 ENCOUNTER — TELEPHONE (OUTPATIENT)
Dept: ONCOLOGY | Facility: CLINIC | Age: 59
End: 2024-06-24
Payer: COMMERCIAL

## 2024-06-24 RX ORDER — ONDANSETRON HYDROCHLORIDE 8 MG/1
8 TABLET, FILM COATED ORAL EVERY 8 HOURS PRN
Qty: 30 TABLET | Refills: 0 | Status: SHIPPED | OUTPATIENT
Start: 2024-06-24

## 2024-06-24 NOTE — TELEPHONE ENCOUNTER
Returned call to Sd.  She reports that last week she was seen at the dentist and was found to be in need of two root canals.  She was prescribed an antibiotic for this at this time.  She also reports that her father flew in to accompany her on the drive to [place] and has come down with Covid, and she also has been diagnosed with Covid. Currently she feels pretty poorly.  She contacted our on call provider yesterday and was prescribed paxlovid.  She states she was instructed not to start it unless her symptoms worsened and per our conversation today, she feels worse today than yesterday and has since spiked a fever of 100.6.  she is to start her Paxlovid, we have sent in zofran for potential gi symptoms.  I have asked to call back should she have any futher issues.  She is planning to head to [place] just as she and her father are feeling better.

## 2024-06-24 NOTE — TELEPHONE ENCOUNTER
Provider: Dr. Chawla  Caller: Sd Man  Relationship to Patient: Self  Call Back Phone Number: [phone]  Reason for Call: Please call pt to advise. Pt stated she has Covid. Was not able to go for treatment in [place]. Also has a a cracked tooth will need root canal. Please call to go over next steps.

## 2024-06-25 NOTE — TELEPHONE ENCOUNTER
Pt was dx with COVID.  She is now not leaving for a few days.  I set reminder for Monday to verify next step.

## 2024-07-01 ENCOUNTER — TELEPHONE (OUTPATIENT)
Dept: CARDIOLOGY | Facility: CLINIC | Age: 59
End: 2024-07-01
Payer: COMMERCIAL

## 2024-07-01 ENCOUNTER — TELEPHONE (OUTPATIENT)
Dept: SLEEP MEDICINE | Facility: HOSPITAL | Age: 59
End: 2024-07-01
Payer: COMMERCIAL

## 2024-07-01 NOTE — TELEPHONE ENCOUNTER
Please let her know that home sleep study did show evidence of sleep apnea.  Sleep medicine will be reaching out to her to schedule an appointment to discuss results and next steps.

## 2024-07-01 NOTE — TELEPHONE ENCOUNTER
Results and recommendations called to pt.  Instructed to call with any further questions or concerns.  Verbalized understanding.    Estefania- pt states that sleep medicine has already called her to schedule, however, she is in [place] for chemo, and is unsure when she will be returning.  Sleep medicine clinic was going to discuss with their doctor, and call pt back.  Any further recommendations?    Thanks so much,  Portia Walker, RN  Triage Nurse, [facility] [place]  07/01/24 09:57 EDT

## 2024-07-01 NOTE — TELEPHONE ENCOUNTER
Spoke with patient , unable to schedule follow up due to pt be in [place] for a while for chemo . Will reach out to Dr for how he would like to proceed .

## 2024-07-03 ENCOUNTER — TELEPHONE (OUTPATIENT)
Dept: SLEEP MEDICINE | Facility: HOSPITAL | Age: 59
End: 2024-07-03
Payer: COMMERCIAL

## 2024-07-03 NOTE — TELEPHONE ENCOUNTER
Spoke with patient , she is willing to get set up on CPAP in [place] .  Will send order to [facility] fax number [phone], she will need to follow up and establish care in [place] if she is unable to follow up at the [place] office .

## 2024-07-10 ENCOUNTER — PATIENT OUTREACH (OUTPATIENT)
Dept: OTHER | Facility: HOSPITAL | Age: 59
End: 2024-07-10
Payer: COMMERCIAL

## 2024-09-10 ENCOUNTER — TELEPHONE (OUTPATIENT)
Dept: ONCOLOGY | Facility: CLINIC | Age: 59
End: 2024-09-10
Payer: COMMERCIAL

## 2024-09-10 PROBLEM — Z45.2 ENCOUNTER FOR FITTING AND ADJUSTMENT OF VASCULAR CATHETER: Status: ACTIVE | Noted: 2024-09-10

## 2024-09-10 RX ORDER — SODIUM CHLORIDE 0.9 % (FLUSH) 0.9 %
10 SYRINGE (ML) INJECTION AS NEEDED
OUTPATIENT
Start: 2024-09-10

## 2024-09-10 RX ORDER — HEPARIN SODIUM (PORCINE) LOCK FLUSH IV SOLN 100 UNIT/ML 100 UNIT/ML
500 SOLUTION INTRAVENOUS AS NEEDED
OUTPATIENT
Start: 2024-09-10

## 2024-09-10 NOTE — TELEPHONE ENCOUNTER
Caller: Sd Man    Relationship: Self    Best call back number:   Telephone Information:   Mobile [phone]     What was the call regarding: PATIENT IS COMING BACK TO [place].  HER NEXT SCHEDULED TREATMENT FOR TAXOL (HAD 3 OUT OF 12 INFUSIONS) WOULD BE ON 9/25/2024, BUT WOULD LIKE IT ON 9/24/2024 IN THE AFTERNOON.     MAY NEED TO COMPLETE PRIOR AUTH FOR THE TAXOL INFUSION.    CALL TO SCHEDULE

## 2024-09-18 ENCOUNTER — TELEPHONE (OUTPATIENT)
Dept: CARDIOLOGY | Facility: CLINIC | Age: 59
End: 2024-09-18

## 2024-09-18 NOTE — TELEPHONE ENCOUNTER
Caller: Sd Man    Relationship to patient: Self    Best call back number:  [phone]    Patient is needing: PATIENT CHECKING TO SEE WHICH SLEEP DOCTOR DR ORR IS WANTING HER TO SEE

## 2024-09-25 ENCOUNTER — OFFICE VISIT (OUTPATIENT)
Dept: ONCOLOGY | Facility: CLINIC | Age: 59
End: 2024-09-25
Payer: COMMERCIAL

## 2024-09-25 ENCOUNTER — INFUSION (OUTPATIENT)
Dept: ONCOLOGY | Facility: HOSPITAL | Age: 59
End: 2024-09-25
Payer: COMMERCIAL

## 2024-09-25 VITALS
BODY MASS INDEX: 41.73 KG/M2 | TEMPERATURE: 98.5 F | WEIGHT: 250.5 LBS | DIASTOLIC BLOOD PRESSURE: 87 MMHG | OXYGEN SATURATION: 96 % | HEART RATE: 103 BPM | HEIGHT: 65 IN | RESPIRATION RATE: 16 BRPM | SYSTOLIC BLOOD PRESSURE: 126 MMHG

## 2024-09-25 DIAGNOSIS — Z17.0 MALIGNANT NEOPLASM OF UPPER-OUTER QUADRANT OF LEFT BREAST IN FEMALE, ESTROGEN RECEPTOR POSITIVE: Primary | ICD-10-CM

## 2024-09-25 DIAGNOSIS — C50.412 MALIGNANT NEOPLASM OF UPPER-OUTER QUADRANT OF LEFT BREAST IN FEMALE, ESTROGEN RECEPTOR POSITIVE: Primary | ICD-10-CM

## 2024-09-25 DIAGNOSIS — Z51.11 ENCOUNTER FOR ANTINEOPLASTIC CHEMOTHERAPY: ICD-10-CM

## 2024-09-25 LAB
ALBUMIN SERPL-MCNC: 4 G/DL (ref 3.5–5.2)
ALBUMIN/GLOB SERPL: 1.9 G/DL
ALP SERPL-CCNC: 89 U/L (ref 39–117)
ALT SERPL W P-5'-P-CCNC: 69 U/L (ref 1–33)
ANION GAP SERPL CALCULATED.3IONS-SCNC: 12.4 MMOL/L (ref 5–15)
AST SERPL-CCNC: 41 U/L (ref 1–32)
BASOPHILS # BLD AUTO: 0.07 10*3/MM3 (ref 0–0.2)
BASOPHILS NFR BLD AUTO: 1.4 % (ref 0–1.5)
BILIRUB SERPL-MCNC: 0.4 MG/DL (ref 0–1.2)
BUN SERPL-MCNC: 19 MG/DL (ref 6–20)
BUN/CREAT SERPL: 25 (ref 7–25)
CALCIUM SPEC-SCNC: 8.8 MG/DL (ref 8.6–10.5)
CHLORIDE SERPL-SCNC: 106 MMOL/L (ref 98–107)
CO2 SERPL-SCNC: 22.6 MMOL/L (ref 22–29)
CREAT SERPL-MCNC: 0.76 MG/DL (ref 0.57–1)
DEPRECATED RDW RBC AUTO: 69.2 FL (ref 37–54)
EGFRCR SERPLBLD CKD-EPI 2021: 91.5 ML/MIN/1.73
EOSINOPHIL # BLD AUTO: 0.21 10*3/MM3 (ref 0–0.4)
EOSINOPHIL NFR BLD AUTO: 4.2 % (ref 0.3–6.2)
ERYTHROCYTE [DISTWIDTH] IN BLOOD BY AUTOMATED COUNT: 19.5 % (ref 12.3–15.4)
GLOBULIN UR ELPH-MCNC: 2.1 GM/DL
GLUCOSE SERPL-MCNC: 175 MG/DL (ref 65–99)
HCT VFR BLD AUTO: 29 % (ref 34–46.6)
HGB BLD-MCNC: 9.9 G/DL (ref 12–15.9)
IMM GRANULOCYTES # BLD AUTO: 0.18 10*3/MM3 (ref 0–0.05)
IMM GRANULOCYTES NFR BLD AUTO: 3.6 % (ref 0–0.5)
LYMPHOCYTES # BLD AUTO: 1.34 10*3/MM3 (ref 0.7–3.1)
LYMPHOCYTES NFR BLD AUTO: 26.6 % (ref 19.6–45.3)
MCH RBC QN AUTO: 34.1 PG (ref 26.6–33)
MCHC RBC AUTO-ENTMCNC: 34.1 G/DL (ref 31.5–35.7)
MCV RBC AUTO: 100 FL (ref 79–97)
MONOCYTES # BLD AUTO: 0.45 10*3/MM3 (ref 0.1–0.9)
MONOCYTES NFR BLD AUTO: 8.9 % (ref 5–12)
NEUTROPHILS NFR BLD AUTO: 2.78 10*3/MM3 (ref 1.7–7)
NEUTROPHILS NFR BLD AUTO: 55.3 % (ref 42.7–76)
NRBC BLD AUTO-RTO: 1 /100 WBC (ref 0–0.2)
PLATELET # BLD AUTO: 295 10*3/MM3 (ref 140–450)
PMV BLD AUTO: 8.3 FL (ref 6–12)
POTASSIUM SERPL-SCNC: 4.6 MMOL/L (ref 3.5–5.2)
PROT SERPL-MCNC: 6.1 G/DL (ref 6–8.5)
RBC # BLD AUTO: 2.9 10*6/MM3 (ref 3.77–5.28)
SODIUM SERPL-SCNC: 141 MMOL/L (ref 136–145)
WBC NRBC COR # BLD AUTO: 5.03 10*3/MM3 (ref 3.4–10.8)

## 2024-09-25 PROCEDURE — 25810000003 SODIUM CHLORIDE 0.9 % SOLUTION: Performed by: INTERNAL MEDICINE

## 2024-09-25 PROCEDURE — 25810000003 SODIUM CHLORIDE 0.9 % SOLUTION 250 ML FLEX CONT: Performed by: INTERNAL MEDICINE

## 2024-09-25 PROCEDURE — 25010000002 PACLITAXEL PER 1 MG: Performed by: INTERNAL MEDICINE

## 2024-09-25 PROCEDURE — 80053 COMPREHEN METABOLIC PANEL: CPT

## 2024-09-25 PROCEDURE — 96375 TX/PRO/DX INJ NEW DRUG ADDON: CPT

## 2024-09-25 PROCEDURE — 96413 CHEMO IV INFUSION 1 HR: CPT

## 2024-09-25 PROCEDURE — 25010000002 DIPHENHYDRAMINE PER 50 MG: Performed by: INTERNAL MEDICINE

## 2024-09-25 PROCEDURE — 25010000002 ONDANSETRON PER 1 MG: Performed by: INTERNAL MEDICINE

## 2024-09-25 PROCEDURE — 25010000002 DEXAMETHASONE SODIUM PHOSPHATE 100 MG/10ML SOLUTION: Performed by: INTERNAL MEDICINE

## 2024-09-25 PROCEDURE — 85025 COMPLETE CBC W/AUTO DIFF WBC: CPT

## 2024-09-25 RX ORDER — FAMOTIDINE 10 MG/ML
20 INJECTION, SOLUTION INTRAVENOUS AS NEEDED
Status: CANCELLED | OUTPATIENT
Start: 2024-09-25

## 2024-09-25 RX ORDER — FAMOTIDINE 10 MG/ML
20 INJECTION, SOLUTION INTRAVENOUS ONCE
Status: COMPLETED | OUTPATIENT
Start: 2024-09-25 | End: 2024-09-25

## 2024-09-25 RX ORDER — SODIUM CHLORIDE 9 MG/ML
20 INJECTION, SOLUTION INTRAVENOUS ONCE
Status: COMPLETED | OUTPATIENT
Start: 2024-09-25 | End: 2024-09-25

## 2024-09-25 RX ORDER — SODIUM CHLORIDE 9 MG/ML
20 INJECTION, SOLUTION INTRAVENOUS ONCE
Status: CANCELLED | OUTPATIENT
Start: 2024-09-25

## 2024-09-25 RX ORDER — ONDANSETRON 2 MG/ML
8 INJECTION INTRAMUSCULAR; INTRAVENOUS ONCE
Status: COMPLETED | OUTPATIENT
Start: 2024-09-25 | End: 2024-09-25

## 2024-09-25 RX ORDER — FAMOTIDINE 10 MG/ML
20 INJECTION, SOLUTION INTRAVENOUS ONCE
Status: CANCELLED | OUTPATIENT
Start: 2024-09-25

## 2024-09-25 RX ORDER — DIPHENHYDRAMINE HYDROCHLORIDE 50 MG/ML
50 INJECTION INTRAMUSCULAR; INTRAVENOUS AS NEEDED
Status: CANCELLED | OUTPATIENT
Start: 2024-09-25

## 2024-09-25 RX ADMIN — SODIUM CHLORIDE 20 ML/HR: 9 INJECTION, SOLUTION INTRAVENOUS at 09:23

## 2024-09-25 RX ADMIN — ONDANSETRON 8 MG: 2 INJECTION INTRAMUSCULAR; INTRAVENOUS at 10:01

## 2024-09-25 RX ADMIN — DIPHENHYDRAMINE HYDROCHLORIDE 25 MG: 50 INJECTION, SOLUTION INTRAMUSCULAR; INTRAVENOUS at 09:39

## 2024-09-25 RX ADMIN — DEXAMETHASONE SODIUM PHOSPHATE 12 MG: 10 INJECTION, SOLUTION INTRAMUSCULAR; INTRAVENOUS at 09:23

## 2024-09-25 RX ADMIN — PACLITAXEL 170 MG: 6 INJECTION, SOLUTION INTRAVENOUS at 10:27

## 2024-09-25 RX ADMIN — FAMOTIDINE 20 MG: 10 INJECTION INTRAVENOUS at 10:02

## 2024-09-26 ENCOUNTER — OFFICE VISIT (OUTPATIENT)
Dept: SLEEP MEDICINE | Facility: HOSPITAL | Age: 59
End: 2024-09-26
Payer: COMMERCIAL

## 2024-09-26 VITALS — WEIGHT: 248 LBS | OXYGEN SATURATION: 95 % | BODY MASS INDEX: 42.34 KG/M2 | HEART RATE: 102 BPM | HEIGHT: 64 IN

## 2024-09-26 DIAGNOSIS — G47.14 HYPERSOMNIA DUE TO MEDICAL CONDITION: ICD-10-CM

## 2024-09-26 DIAGNOSIS — E66.01 CLASS 3 SEVERE OBESITY DUE TO EXCESS CALORIES WITH SERIOUS COMORBIDITY AND BODY MASS INDEX (BMI) OF 40.0 TO 44.9 IN ADULT: ICD-10-CM

## 2024-09-26 DIAGNOSIS — G47.33 OSA ON CPAP: Primary | ICD-10-CM

## 2024-09-26 DIAGNOSIS — G47.36 HYPOXEMIA ASSOCIATED WITH SLEEP: ICD-10-CM

## 2024-09-26 PROCEDURE — G0463 HOSPITAL OUTPT CLINIC VISIT: HCPCS

## 2024-09-27 NOTE — PROGRESS NOTES
Subjective   Sd Man is a 59 y.o. female.  Referred by Dr. Mccain for left breast invasive ductal carcinoma    History of Present Illness     Patient is a 59-year-old postmenopausal lady who presented with a palpable abnormality of the left breast She initially felt and March 2024.  Patient feels like the mass has increased in size since she initially palpated it.  She brought this to the attention of her gynecologist who ordered diagnostic imaging and further workup.    Patient denies any previous abnormal mammograms or breast biopsies.    She has a diagnosis of PCOS and possible diabetes.  Her last normal mammogram prior to the most recent one was in 2015.    Patient reports extremely stressful situation at home with 3 of her daughters having had mental health issues and her ex- with bipolar disorder.  She has been dealing with a lot of stress since 2017.  She currently works as a .  She was working at [employer] in the past but no longer there.    Patient currently sees [facility] and on medications for anxiety and depression.  She also sees a counselor on a regular basis and has an appointment coming up with them.    Her support system is mainly her parents who live in [place] and also she has a few friends here.  During the summer if she were to do chemotherapy she prefers to get that done in [place] and subsequently transferred care back to us.    4/9/2024-bilateral diagnostic mammogram  Breast that heterogeneously dense.  Finding 1 high density irregular mass measuring 20 mm with indistinct margins at 2:00 in the left breast, 11 cm from the nipple.  This indicates the palpable lump in the left breast.  No suspicious abnormalities of the right breast.    Left breast ultrasound  Finding 1.irregular solid mass measuring 20 x 17 x 20 mm in the posterior one third left breast, 2:00, 11 cm from the nipple.  Finding 2.lymph node measures 20 x 9 x 13 mm on the left  breast.  4 mm cortical thickness noted.    Impression  Finding 1.solid mass in the left breast is suspicious, ultrasound-guided biopsy recommended  Finding 2.lymph node in the left breast is suspicious, FNA and possible spring-loaded or limited core biopsy is recommended.    4/16/2024-left breast ultrasound-guided biopsy  1.left breast 2:00-invasive ductal carcinoma  Grade 3  Invasive carcinoma involves multiple tissue cores and measures up to 14 mm  ER +91 to 100% strong  IL +31 to 40% moderate  HER2 negative, 1+  Ki-67 95%    2.lymph node of the left axilla with free-floating fragments of carcinoma, suspicious for metastasis.    5/10/2024-bilateral breast MRI  Biopsy-proven malignancy in the left breast measuring 2.7 cm at 3:00.  No other suspicious findings are seen.  Left axilla lymph node has undergone prior biopsy and shows diffuse cortical thickening suspicious for left axilla lymphadenopathy.  No suspicious abnormalities of the right breast.    5/23/2024-CT of the chest abdomen and pelvis  1.bone island in the right iliac wing, subchondral cyst in the left acetabulum.  2.2 0.4 cm rounded density in the lateral aspect of the left breast with a surgical marker.  2.4 cm left axillary lymph node.  3.12 millimeters subcapsular cyst in the left lobe of the liver, attenuation compatible with serous fluid.  Second likely cyst within the right lobe of the liver measuring 15 mm.  Attenuation compatible with slightly complex fluid.  No grossly suspicious liver lesion identified.  Further imaging of the liver with an ultrasound recommended.  4.no clear evidence of metastatic disease.    5/23/2024-bone scan without any evidence of metastatic disease    Patient presents today to discuss neoadjuvant chemotherapy given the high-grade and high Ki-67 of the tumor although the tumor is strongly ER positive.    She is extremely reluctant to the idea of receiving chemotherapy as she is concerned about feeling sick and she wants  to work for at least 1 year.    Oncotype DX recurrence score on the core biopsy returned at 73 with a greater than 32% risk of distant metastasis with AI or tamoxifen alone.  Significant benefit from chemotherapy noted.    Interval history  Patient had moved to [place] after the initial diagnosis and initiated neoadjuvant chemotherapy with dose dense Adriamycin and Cytoxan under the care of  at [facility] [place] oncology in [place], [place].  She completed the 4 cycles of dose dense Adriamycin and Cytoxan and currently receiving weekly Taxol.  Chemotherapy initiated on 7/12/2024.  Patient has not had any chemotherapy dose delays or interruptions.    This is a 59-year-old female who presents for follow-up on the above diagnosis. She is due for her 5th cycle of weekly Taxol. She denies numbness/tingling. She denies nausea or vomiting, does have some diarrhea, but reports it is not bothersome. She does not use imodium. Has some fatigue, however, remains at baseline. She continues to walk daily with her neighbor.     The following portions of the patient's history were reviewed and updated as appropriate: allergies, current medications, past family history, past medical history, past social history, past surgical history, and problem list.    Past Medical History:   Diagnosis Date    ADHD     Anxiety     Breast cancer     Depression     Insulin resistance     Metabolic syndrome X     Obesity 11/11/2012    PCOS (polycystic ovarian syndrome)     PTSD (post-traumatic stress disorder)         Past Surgical History:   Procedure Laterality Date    OOPHORECTOMY Bilateral 01/01/2014    VENOUS ACCESS DEVICE (PORT) INSERTION Right 6/20/2024    Procedure: INSERTION VENOUS ACCESS DEVICE;  Surgeon: Moira Mccain MD;  Location: [facility];  Service: General;  Laterality: Right;        Family History   Problem Relation Age of Onset    Melanoma Mother 75        alive currently 79    Atrial fibrillation Father      "Hypertension Father     Malig Hyperthermia Neg Hx         Social History     Socioeconomic History    Marital status:    Tobacco Use    Smoking status: Never     Passive exposure: Never    Smokeless tobacco: Never   Vaping Use    Vaping status: Never Used   Substance and Sexual Activity    Alcohol use: Never    Drug use: Never    Sexual activity: Not Currently        OB History    No obstetric history on file.      Age at menarche-14  Age at first live childbirth-32   3 para 3  0  Age at menopause-47  Breast-feeding for 2 to 3 years  Oral contraceptive pill use none  Hormone replacement therapy for 2 years    No Known Allergies       Objective   Blood pressure 111/77, pulse 111, temperature 98.6 °F (37 °C), temperature source Oral, resp. rate 20, height 162 cm (63.78\"), weight 113 kg (250 lb 3.2 oz), SpO2 95%.   Physical Exam  Vitals reviewed.   Constitutional:       Appearance: Normal appearance. She is obese.   HENT:      Nose: Nose normal.      Mouth/Throat:      Pharynx: Oropharynx is clear.   Eyes:      Conjunctiva/sclera: Conjunctivae normal.   Cardiovascular:      Rate and Rhythm: Normal rate and regular rhythm.   Pulmonary:      Effort: Pulmonary effort is normal.   Abdominal:      General: Abdomen is flat.   Musculoskeletal:         General: Normal range of motion.      Cervical back: Normal range of motion.   Skin:     General: Skin is warm and dry.   Neurological:      General: No focal deficit present.      Mental Status: She is alert and oriented to person, place, and time.   Psychiatric:         Mood and Affect: Mood normal.         Behavior: Behavior normal.         Thought Content: Thought content normal.         Judgment: Judgment normal.         Breast exam:  Right breast: Appears normal on inspection.  No palpable abnormalities of the right breast.  Left breast: On inspection no significant abnormalities other than biopsy site changes.  The left axillary mass at 2:00 as well " as the left axillary lymphadenopathy is no longer palpable.  No cervical or supraclavicular lymphadenopathy.    Labs:  Results from last 7 days   Lab Units 10/02/24  0835   WBC 10*3/mm3 5.93   NEUTROS ABS 10*3/mm3 3.34   HEMOGLOBIN g/dL 10.7*   HEMATOCRIT % 31.5*   PLATELETS 10*3/mm3 312     Results from last 7 days   Lab Units 10/02/24  0835   SODIUM mmol/L 140   POTASSIUM mmol/L 4.3   CHLORIDE mmol/L 105   CO2 mmol/L 25.9   BUN mg/dL 13   CREATININE mg/dL 0.75   CALCIUM mg/dL 8.9   ALBUMIN g/dL 4.0   BILIRUBIN mg/dL 0.5   ALK PHOS U/L 92   ALT (SGPT) U/L 90*   AST (SGOT) U/L 53*   GLUCOSE mg/dL 177*           Dexa bone density and peripheral    Result Date: 9/18/2024   Your patient is considered normal based on a DXA scan of the hips, spine & left forearm. No previous bone density studies. Please note the above FRAX percentages, which indicates this patient's percentage fracture risk over 10 years and associated risk factors. Note: Secondary Osteoporosis can be caused by Type 1 Diabetes, Osteogenesis Imperfecta, Hyperthyroidism, Hypogonadism, Premature Menopause (<46 years of age), chronic malnutrition, Malabsorption (Gastric Bypass) and Chronic liver disease. Potential additional risk factors include: Body weight < 127 lbs, Height loss > 1 inch, or Dilantin or Anastrazole (Arimidex) usage. NOF treatment guidelines suggest treatment based on the following: A hip or vertebral (clinical or morphometric) fracture, T-scores < -2.5 at the femoral neck or spine, Low bone mass (T-score between -1 and -2.5 at the femoral neck or spine) and a 10-year probability of a major osteoporosis-related fracture >20% or hip fracture >3%. Clinician's judgement and/or patient preferences may indicate treatment for people with 10-year fracture probabilities above or below these levels. A follow up DXA scan is recommended in 24 months if the patient is osteoporotic. Sincerely, Td Berger M.D. [facility] Cert[facility] Clinical        MRI of the breast images independently reviewed interpreted by me in detail summarized above  CT chest abdomen pelvis and bone scan images independently reviewed and interpreted by me in detail summarized above    6/4/2024-ultrasound of the liver-echogenic liver consistent with fatty infiltration  1.6 cm benign-appearing right hepatic lobe cyst.    Reviewed ultrasound images independently and interpreted them independently.    Assessment & Plan   *Left breast invasive ductal carcinoma  Clinical T2 N1a M0, Anatomic stage IIb, prognostic stage IIa.  The tumor is grade 3, Ki-67 95%, ER +91 to 100% strong, PA +31 to 40% moderate.  Oncotype DX recurrence score of the tumor noted to be 73 with a greater than 32% risk of distant metastasis with AI or tamoxifen alone.  Significant benefit from chemotherapy.  Initiated dose dense Adriamycin and Cytoxan on 7/12/2024 at [facility] under the care of Dr. Cash  Noted to have a significant response in the breast after initiation of neoadjuvant chemotherapy.  9/25/2024-week 4 of Taxol, labs reviewed and stable to proceed.  10/2/2024: Proceed with week 5 Taxol. Continues to tolerate well.     *Anemia  Hemoglobin has decreased to 9.9, secondary to chemotherapy  Continue to monitor  Patient reports fatigue as a result of anemia  10/2/2024: Hemoglobin 10.7    *Severe anxiety and suicidal thoughts  She is a current patient of [facility].  She sees a psychiatric nurse practitioner as well as a counselor and has an appointment with them.  Currently on Trintellix 10 mg.  Severe stressors as reported in the HPI.  10/2/2024: No complaints of this today.     *Obesity  Class III  BMI 41.7    *Elevated Liver enzymes  10/2/2024: ALT 90 and AST 53. Will continue to monitor. Advised against tylenol.       *Possible diabetes-she will require follow-up with her primary care physician for management of the same.      *Cardiac health-she has seen Dr. Syed and  echocardiogram has been obtained which shows an ejection fraction of 65% with a GLS of -19.6%      Plan:  Proceed with Week 5 of weekly Taxol.   Return next week for Week 6 taxol, cbc and cmp.   MD visit on 10/16/2024 for Week 7 Taxol, cbc and cmp.   Instructed to reach out sooner with any concerns or problems.     Cristina Boston, APRN

## 2024-09-29 PROBLEM — G47.14 HYPERSOMNIA DUE TO MEDICAL CONDITION: Status: ACTIVE | Noted: 2024-09-29

## 2024-09-29 PROBLEM — G47.33 OSA ON CPAP: Status: ACTIVE | Noted: 2024-06-17

## 2024-10-02 ENCOUNTER — INFUSION (OUTPATIENT)
Dept: ONCOLOGY | Facility: HOSPITAL | Age: 59
End: 2024-10-02
Payer: COMMERCIAL

## 2024-10-02 ENCOUNTER — OFFICE VISIT (OUTPATIENT)
Dept: ONCOLOGY | Facility: CLINIC | Age: 59
End: 2024-10-02
Payer: COMMERCIAL

## 2024-10-02 VITALS — SYSTOLIC BLOOD PRESSURE: 125 MMHG | HEART RATE: 101 BPM | DIASTOLIC BLOOD PRESSURE: 83 MMHG

## 2024-10-02 VITALS
DIASTOLIC BLOOD PRESSURE: 77 MMHG | HEIGHT: 64 IN | SYSTOLIC BLOOD PRESSURE: 111 MMHG | WEIGHT: 250.2 LBS | BODY MASS INDEX: 42.72 KG/M2 | HEART RATE: 111 BPM | RESPIRATION RATE: 20 BRPM | OXYGEN SATURATION: 95 % | TEMPERATURE: 98.6 F

## 2024-10-02 DIAGNOSIS — Z17.0 MALIGNANT NEOPLASM OF UPPER-OUTER QUADRANT OF LEFT BREAST IN FEMALE, ESTROGEN RECEPTOR POSITIVE: Primary | ICD-10-CM

## 2024-10-02 DIAGNOSIS — L65.9 ALOPECIA: ICD-10-CM

## 2024-10-02 DIAGNOSIS — C50.412 MALIGNANT NEOPLASM OF UPPER-OUTER QUADRANT OF LEFT BREAST IN FEMALE, ESTROGEN RECEPTOR POSITIVE: Primary | ICD-10-CM

## 2024-10-02 DIAGNOSIS — Z45.2 ENCOUNTER FOR FITTING AND ADJUSTMENT OF VASCULAR CATHETER: ICD-10-CM

## 2024-10-02 DIAGNOSIS — Z79.899 HIGH RISK MEDICATION USE: ICD-10-CM

## 2024-10-02 DIAGNOSIS — Z51.11 ENCOUNTER FOR ANTINEOPLASTIC CHEMOTHERAPY: ICD-10-CM

## 2024-10-02 LAB
ALBUMIN SERPL-MCNC: 4 G/DL (ref 3.5–5.2)
ALBUMIN/GLOB SERPL: 1.7 G/DL
ALP SERPL-CCNC: 92 U/L (ref 39–117)
ALT SERPL W P-5'-P-CCNC: 90 U/L (ref 1–33)
ANION GAP SERPL CALCULATED.3IONS-SCNC: 9.1 MMOL/L (ref 5–15)
AST SERPL-CCNC: 53 U/L (ref 1–32)
BASOPHILS # BLD AUTO: 0.05 10*3/MM3 (ref 0–0.2)
BASOPHILS NFR BLD AUTO: 0.8 % (ref 0–1.5)
BILIRUB SERPL-MCNC: 0.5 MG/DL (ref 0–1.2)
BUN SERPL-MCNC: 13 MG/DL (ref 6–20)
BUN/CREAT SERPL: 17.3 (ref 7–25)
CALCIUM SPEC-SCNC: 8.9 MG/DL (ref 8.6–10.5)
CHLORIDE SERPL-SCNC: 105 MMOL/L (ref 98–107)
CO2 SERPL-SCNC: 25.9 MMOL/L (ref 22–29)
CREAT SERPL-MCNC: 0.75 MG/DL (ref 0.57–1)
DEPRECATED RDW RBC AUTO: 68.2 FL (ref 37–54)
EGFRCR SERPLBLD CKD-EPI 2021: 93 ML/MIN/1.73
EOSINOPHIL # BLD AUTO: 0.21 10*3/MM3 (ref 0–0.4)
EOSINOPHIL NFR BLD AUTO: 3.5 % (ref 0.3–6.2)
ERYTHROCYTE [DISTWIDTH] IN BLOOD BY AUTOMATED COUNT: 18.6 % (ref 12.3–15.4)
GLOBULIN UR ELPH-MCNC: 2.3 GM/DL
GLUCOSE SERPL-MCNC: 177 MG/DL (ref 65–99)
HCT VFR BLD AUTO: 31.5 % (ref 34–46.6)
HGB BLD-MCNC: 10.7 G/DL (ref 12–15.9)
IMM GRANULOCYTES # BLD AUTO: 0.15 10*3/MM3 (ref 0–0.05)
IMM GRANULOCYTES NFR BLD AUTO: 2.5 % (ref 0–0.5)
LYMPHOCYTES # BLD AUTO: 1.69 10*3/MM3 (ref 0.7–3.1)
LYMPHOCYTES NFR BLD AUTO: 28.5 % (ref 19.6–45.3)
MCH RBC QN AUTO: 33.9 PG (ref 26.6–33)
MCHC RBC AUTO-ENTMCNC: 34 G/DL (ref 31.5–35.7)
MCV RBC AUTO: 99.7 FL (ref 79–97)
MONOCYTES # BLD AUTO: 0.49 10*3/MM3 (ref 0.1–0.9)
MONOCYTES NFR BLD AUTO: 8.3 % (ref 5–12)
NEUTROPHILS NFR BLD AUTO: 3.34 10*3/MM3 (ref 1.7–7)
NEUTROPHILS NFR BLD AUTO: 56.4 % (ref 42.7–76)
NRBC BLD AUTO-RTO: 0.5 /100 WBC (ref 0–0.2)
PLATELET # BLD AUTO: 312 10*3/MM3 (ref 140–450)
PMV BLD AUTO: 9 FL (ref 6–12)
POTASSIUM SERPL-SCNC: 4.3 MMOL/L (ref 3.5–5.2)
PROT SERPL-MCNC: 6.3 G/DL (ref 6–8.5)
RBC # BLD AUTO: 3.16 10*6/MM3 (ref 3.77–5.28)
SODIUM SERPL-SCNC: 140 MMOL/L (ref 136–145)
WBC NRBC COR # BLD AUTO: 5.93 10*3/MM3 (ref 3.4–10.8)

## 2024-10-02 PROCEDURE — 25010000002 PACLITAXEL PER 1 MG: Performed by: NURSE PRACTITIONER

## 2024-10-02 PROCEDURE — 25010000002 HEPARIN LOCK FLUSH PER 10 UNITS: Performed by: INTERNAL MEDICINE

## 2024-10-02 PROCEDURE — 25010000002 ONDANSETRON PER 1 MG: Performed by: NURSE PRACTITIONER

## 2024-10-02 PROCEDURE — 25810000003 SODIUM CHLORIDE 0.9 % SOLUTION: Performed by: NURSE PRACTITIONER

## 2024-10-02 PROCEDURE — 85025 COMPLETE CBC W/AUTO DIFF WBC: CPT

## 2024-10-02 PROCEDURE — 25810000003 SODIUM CHLORIDE 0.9 % SOLUTION 250 ML FLEX CONT: Performed by: NURSE PRACTITIONER

## 2024-10-02 PROCEDURE — 25010000002 DEXAMETHASONE SODIUM PHOSPHATE 100 MG/10ML SOLUTION: Performed by: NURSE PRACTITIONER

## 2024-10-02 PROCEDURE — 96413 CHEMO IV INFUSION 1 HR: CPT

## 2024-10-02 PROCEDURE — 25010000002 DIPHENHYDRAMINE PER 50 MG: Performed by: NURSE PRACTITIONER

## 2024-10-02 PROCEDURE — 96367 TX/PROPH/DG ADDL SEQ IV INF: CPT

## 2024-10-02 PROCEDURE — 80053 COMPREHEN METABOLIC PANEL: CPT

## 2024-10-02 PROCEDURE — 96375 TX/PRO/DX INJ NEW DRUG ADDON: CPT

## 2024-10-02 RX ORDER — LIDOCAINE/PRILOCAINE 2.5 %-2.5%
CREAM (GRAM) TOPICAL
COMMUNITY
Start: 2024-07-07

## 2024-10-02 RX ORDER — FAMOTIDINE 10 MG/ML
20 INJECTION, SOLUTION INTRAVENOUS ONCE
Status: CANCELLED | OUTPATIENT
Start: 2024-10-02

## 2024-10-02 RX ORDER — FAMOTIDINE 10 MG/ML
20 INJECTION, SOLUTION INTRAVENOUS ONCE
Status: COMPLETED | OUTPATIENT
Start: 2024-10-02 | End: 2024-10-02

## 2024-10-02 RX ORDER — SODIUM CHLORIDE 0.9 % (FLUSH) 0.9 %
10 SYRINGE (ML) INJECTION AS NEEDED
OUTPATIENT
Start: 2024-10-02

## 2024-10-02 RX ORDER — HEPARIN SODIUM (PORCINE) LOCK FLUSH IV SOLN 100 UNIT/ML 100 UNIT/ML
500 SOLUTION INTRAVENOUS AS NEEDED
OUTPATIENT
Start: 2024-10-02

## 2024-10-02 RX ORDER — SODIUM CHLORIDE 9 MG/ML
20 INJECTION, SOLUTION INTRAVENOUS ONCE
Status: CANCELLED | OUTPATIENT
Start: 2024-10-02

## 2024-10-02 RX ORDER — DIPHENHYDRAMINE HYDROCHLORIDE 50 MG/ML
50 INJECTION INTRAMUSCULAR; INTRAVENOUS AS NEEDED
Status: CANCELLED | OUTPATIENT
Start: 2024-10-02

## 2024-10-02 RX ORDER — OLANZAPINE 5 MG/1
5 TABLET ORAL DAILY
COMMUNITY
Start: 2024-08-19

## 2024-10-02 RX ORDER — FAMOTIDINE 10 MG/ML
20 INJECTION, SOLUTION INTRAVENOUS AS NEEDED
Status: CANCELLED | OUTPATIENT
Start: 2024-10-02

## 2024-10-02 RX ORDER — HEPARIN SODIUM (PORCINE) LOCK FLUSH IV SOLN 100 UNIT/ML 100 UNIT/ML
500 SOLUTION INTRAVENOUS AS NEEDED
Status: DISCONTINUED | OUTPATIENT
Start: 2024-10-02 | End: 2024-10-02 | Stop reason: HOSPADM

## 2024-10-02 RX ORDER — SODIUM CHLORIDE 9 MG/ML
20 INJECTION, SOLUTION INTRAVENOUS ONCE
Status: COMPLETED | OUTPATIENT
Start: 2024-10-02 | End: 2024-10-02

## 2024-10-02 RX ORDER — SODIUM CHLORIDE 0.9 % (FLUSH) 0.9 %
10 SYRINGE (ML) INJECTION AS NEEDED
Status: DISCONTINUED | OUTPATIENT
Start: 2024-10-02 | End: 2024-10-02 | Stop reason: HOSPADM

## 2024-10-02 RX ADMIN — SODIUM CHLORIDE 20 ML/HR: 9 INJECTION, SOLUTION INTRAVENOUS at 09:46

## 2024-10-02 RX ADMIN — DIPHENHYDRAMINE HYDROCHLORIDE 25 MG: 50 INJECTION, SOLUTION INTRAMUSCULAR; INTRAVENOUS at 10:09

## 2024-10-02 RX ADMIN — FAMOTIDINE 20 MG: 10 INJECTION INTRAVENOUS at 09:46

## 2024-10-02 RX ADMIN — PACLITAXEL 170 MG: 6 INJECTION, SOLUTION INTRAVENOUS at 11:17

## 2024-10-02 RX ADMIN — DEXAMETHASONE SODIUM PHOSPHATE 12 MG: 10 INJECTION, SOLUTION INTRAMUSCULAR; INTRAVENOUS at 10:29

## 2024-10-02 RX ADMIN — Medication 10 ML: at 12:23

## 2024-10-02 RX ADMIN — Medication 500 UNITS: at 12:23

## 2024-10-02 RX ADMIN — ONDANSETRON 8 MG: 2 INJECTION INTRAMUSCULAR; INTRAVENOUS at 09:51

## 2024-10-02 NOTE — NURSING NOTE
Pt to infusion room for chemo treatment . Pt seen by Cristina Valle NP  Lab Results   Component Value Date    WBC 5.93 10/02/2024    HGB 10.7 (L) 10/02/2024    HCT 31.5 (L) 10/02/2024    MCV 99.7 (H) 10/02/2024     10/02/2024     Lab Results   Component Value Date    GLUCOSE 177 (H) 10/02/2024    BUN 13 10/02/2024    CREATININE 0.75 10/02/2024     10/02/2024    K 4.3 10/02/2024     10/02/2024    CALCIUM 8.9 10/02/2024    PROTEINTOT 6.3 10/02/2024    ALBUMIN 4.0 10/02/2024    ALT 90 (H) 10/02/2024    AST 53 (H) 10/02/2024    ALKPHOS 92 10/02/2024    BILITOT 0.5 10/02/2024    GLOB 2.3 10/02/2024    AGRATIO 1.7 10/02/2024    BCR 17.3 10/02/2024    ANIONGAP 9.1 10/02/2024    EGFR 93.0 10/02/2024    Per Cristina NUGENT okay to treat today with elevated liver enzymes    Also patient asking about getting her teeth cleaned tomorrow .  Discussed with Cristina NUGENT,   Per Cristina NUGENT pt should delay gettting her teeth cleaned at this time.  Informed pt of this and she V/U

## 2024-10-09 ENCOUNTER — INFUSION (OUTPATIENT)
Dept: ONCOLOGY | Facility: HOSPITAL | Age: 59
End: 2024-10-09
Payer: COMMERCIAL

## 2024-10-09 VITALS
WEIGHT: 254 LBS | TEMPERATURE: 96.7 F | SYSTOLIC BLOOD PRESSURE: 137 MMHG | BODY MASS INDEX: 43.36 KG/M2 | HEIGHT: 64 IN | DIASTOLIC BLOOD PRESSURE: 84 MMHG | OXYGEN SATURATION: 98 % | RESPIRATION RATE: 15 BRPM | HEART RATE: 106 BPM

## 2024-10-09 DIAGNOSIS — C50.412 MALIGNANT NEOPLASM OF UPPER-OUTER QUADRANT OF LEFT BREAST IN FEMALE, ESTROGEN RECEPTOR POSITIVE: Primary | ICD-10-CM

## 2024-10-09 DIAGNOSIS — Z45.2 ENCOUNTER FOR FITTING AND ADJUSTMENT OF VASCULAR CATHETER: ICD-10-CM

## 2024-10-09 DIAGNOSIS — Z17.0 MALIGNANT NEOPLASM OF UPPER-OUTER QUADRANT OF LEFT BREAST IN FEMALE, ESTROGEN RECEPTOR POSITIVE: Primary | ICD-10-CM

## 2024-10-09 LAB
ALBUMIN SERPL-MCNC: 4 G/DL (ref 3.5–5.2)
ALBUMIN/GLOB SERPL: 1.7 G/DL
ALP SERPL-CCNC: 105 U/L (ref 39–117)
ALT SERPL W P-5'-P-CCNC: 85 U/L (ref 1–33)
ANION GAP SERPL CALCULATED.3IONS-SCNC: 11 MMOL/L (ref 5–15)
AST SERPL-CCNC: 42 U/L (ref 1–32)
BASOPHILS # BLD AUTO: 0.05 10*3/MM3 (ref 0–0.2)
BASOPHILS NFR BLD AUTO: 0.8 % (ref 0–1.5)
BILIRUB SERPL-MCNC: 0.3 MG/DL (ref 0–1.2)
BUN SERPL-MCNC: 16 MG/DL (ref 6–20)
BUN/CREAT SERPL: 20.5 (ref 7–25)
CALCIUM SPEC-SCNC: 9.1 MG/DL (ref 8.6–10.5)
CHLORIDE SERPL-SCNC: 106 MMOL/L (ref 98–107)
CO2 SERPL-SCNC: 24 MMOL/L (ref 22–29)
CREAT SERPL-MCNC: 0.78 MG/DL (ref 0.57–1)
DEPRECATED RDW RBC AUTO: 66.6 FL (ref 37–54)
EGFRCR SERPLBLD CKD-EPI 2021: 88.7 ML/MIN/1.73
EOSINOPHIL # BLD AUTO: 0.17 10*3/MM3 (ref 0–0.4)
EOSINOPHIL NFR BLD AUTO: 2.7 % (ref 0.3–6.2)
ERYTHROCYTE [DISTWIDTH] IN BLOOD BY AUTOMATED COUNT: 17.9 % (ref 12.3–15.4)
GLOBULIN UR ELPH-MCNC: 2.3 GM/DL
GLUCOSE SERPL-MCNC: 152 MG/DL (ref 65–99)
HCT VFR BLD AUTO: 29.6 % (ref 34–46.6)
HGB BLD-MCNC: 10.1 G/DL (ref 12–15.9)
IMM GRANULOCYTES # BLD AUTO: 0.26 10*3/MM3 (ref 0–0.05)
IMM GRANULOCYTES NFR BLD AUTO: 4.1 % (ref 0–0.5)
LYMPHOCYTES # BLD AUTO: 1.68 10*3/MM3 (ref 0.7–3.1)
LYMPHOCYTES NFR BLD AUTO: 26.7 % (ref 19.6–45.3)
MCH RBC QN AUTO: 34.7 PG (ref 26.6–33)
MCHC RBC AUTO-ENTMCNC: 34.1 G/DL (ref 31.5–35.7)
MCV RBC AUTO: 101.7 FL (ref 79–97)
MONOCYTES # BLD AUTO: 0.53 10*3/MM3 (ref 0.1–0.9)
MONOCYTES NFR BLD AUTO: 8.4 % (ref 5–12)
NEUTROPHILS NFR BLD AUTO: 3.61 10*3/MM3 (ref 1.7–7)
NEUTROPHILS NFR BLD AUTO: 57.3 % (ref 42.7–76)
NRBC BLD AUTO-RTO: 0.5 /100 WBC (ref 0–0.2)
PLATELET # BLD AUTO: 340 10*3/MM3 (ref 140–450)
PMV BLD AUTO: 8.7 FL (ref 6–12)
POTASSIUM SERPL-SCNC: 3.8 MMOL/L (ref 3.5–5.2)
PROT SERPL-MCNC: 6.3 G/DL (ref 6–8.5)
RBC # BLD AUTO: 2.91 10*6/MM3 (ref 3.77–5.28)
SODIUM SERPL-SCNC: 141 MMOL/L (ref 136–145)
WBC NRBC COR # BLD AUTO: 6.3 10*3/MM3 (ref 3.4–10.8)

## 2024-10-09 PROCEDURE — 25010000002 HEPARIN LOCK FLUSH PER 10 UNITS: Performed by: INTERNAL MEDICINE

## 2024-10-09 PROCEDURE — 85025 COMPLETE CBC W/AUTO DIFF WBC: CPT | Performed by: INTERNAL MEDICINE

## 2024-10-09 PROCEDURE — 25010000002 ONDANSETRON PER 1 MG: Performed by: INTERNAL MEDICINE

## 2024-10-09 PROCEDURE — 25010000002 DEXAMETHASONE SODIUM PHOSPHATE 100 MG/10ML SOLUTION: Performed by: INTERNAL MEDICINE

## 2024-10-09 PROCEDURE — 25810000003 SODIUM CHLORIDE 0.9 % SOLUTION: Performed by: INTERNAL MEDICINE

## 2024-10-09 PROCEDURE — 25010000002 DIPHENHYDRAMINE PER 50 MG: Performed by: INTERNAL MEDICINE

## 2024-10-09 PROCEDURE — 96413 CHEMO IV INFUSION 1 HR: CPT

## 2024-10-09 PROCEDURE — 25010000002 PACLITAXEL PER 1 MG: Performed by: NURSE PRACTITIONER

## 2024-10-09 PROCEDURE — 80053 COMPREHEN METABOLIC PANEL: CPT | Performed by: INTERNAL MEDICINE

## 2024-10-09 PROCEDURE — 96375 TX/PRO/DX INJ NEW DRUG ADDON: CPT

## 2024-10-09 PROCEDURE — 25810000003 SODIUM CHLORIDE 0.9 % SOLUTION 250 ML FLEX CONT: Performed by: NURSE PRACTITIONER

## 2024-10-09 RX ORDER — ONDANSETRON 2 MG/ML
8 INJECTION INTRAMUSCULAR; INTRAVENOUS ONCE
Status: COMPLETED | OUTPATIENT
Start: 2024-10-09 | End: 2024-10-09

## 2024-10-09 RX ORDER — SODIUM CHLORIDE 9 MG/ML
20 INJECTION, SOLUTION INTRAVENOUS ONCE
Status: COMPLETED | OUTPATIENT
Start: 2024-10-09 | End: 2024-10-09

## 2024-10-09 RX ORDER — HEPARIN SODIUM (PORCINE) LOCK FLUSH IV SOLN 100 UNIT/ML 100 UNIT/ML
500 SOLUTION INTRAVENOUS AS NEEDED
Status: DISCONTINUED | OUTPATIENT
Start: 2024-10-09 | End: 2024-10-09 | Stop reason: HOSPADM

## 2024-10-09 RX ORDER — FAMOTIDINE 10 MG/ML
20 INJECTION, SOLUTION INTRAVENOUS ONCE
Status: COMPLETED | OUTPATIENT
Start: 2024-10-09 | End: 2024-10-09

## 2024-10-09 RX ORDER — HEPARIN SODIUM (PORCINE) LOCK FLUSH IV SOLN 100 UNIT/ML 100 UNIT/ML
500 SOLUTION INTRAVENOUS AS NEEDED
OUTPATIENT
Start: 2024-10-09

## 2024-10-09 RX ORDER — SODIUM CHLORIDE 0.9 % (FLUSH) 0.9 %
10 SYRINGE (ML) INJECTION AS NEEDED
OUTPATIENT
Start: 2024-10-09

## 2024-10-09 RX ORDER — SODIUM CHLORIDE 0.9 % (FLUSH) 0.9 %
10 SYRINGE (ML) INJECTION AS NEEDED
Status: DISCONTINUED | OUTPATIENT
Start: 2024-10-09 | End: 2024-10-09 | Stop reason: HOSPADM

## 2024-10-09 RX ORDER — DIPHENHYDRAMINE HYDROCHLORIDE 50 MG/ML
50 INJECTION INTRAMUSCULAR; INTRAVENOUS AS NEEDED
Status: CANCELLED | OUTPATIENT
Start: 2024-10-09

## 2024-10-09 RX ORDER — FAMOTIDINE 10 MG/ML
20 INJECTION, SOLUTION INTRAVENOUS AS NEEDED
Status: CANCELLED | OUTPATIENT
Start: 2024-10-09

## 2024-10-09 RX ADMIN — DEXAMETHASONE SODIUM PHOSPHATE 12 MG: 10 INJECTION, SOLUTION INTRAMUSCULAR; INTRAVENOUS at 10:46

## 2024-10-09 RX ADMIN — ONDANSETRON 8 MG: 2 INJECTION INTRAMUSCULAR; INTRAVENOUS at 10:43

## 2024-10-09 RX ADMIN — FAMOTIDINE 20 MG: 10 INJECTION INTRAVENOUS at 10:38

## 2024-10-09 RX ADMIN — SODIUM CHLORIDE 20 ML/HR: 9 INJECTION, SOLUTION INTRAVENOUS at 10:19

## 2024-10-09 RX ADMIN — PACLITAXEL 170 MG: 6 INJECTION, SOLUTION INTRAVENOUS at 11:31

## 2024-10-09 RX ADMIN — DIPHENHYDRAMINE HYDROCHLORIDE 25 MG: 50 INJECTION INTRAMUSCULAR; INTRAVENOUS at 10:20

## 2024-10-09 RX ADMIN — Medication 10 ML: at 12:38

## 2024-10-09 RX ADMIN — Medication 500 UNITS: at 12:38

## 2024-10-10 ENCOUNTER — TELEPHONE (OUTPATIENT)
Dept: ONCOLOGY | Facility: CLINIC | Age: 59
End: 2024-10-10
Payer: COMMERCIAL

## 2024-10-10 DIAGNOSIS — L65.8 ALOPECIA DUE TO CYTOTOXIC DRUG: Primary | ICD-10-CM

## 2024-10-10 DIAGNOSIS — T45.1X5A ALOPECIA DUE TO CYTOTOXIC DRUG: Primary | ICD-10-CM

## 2024-10-10 RX ORDER — OLANZAPINE 5 MG/1
5 TABLET ORAL DAILY
Qty: 30 TABLET | Refills: 0 | Status: SHIPPED | OUTPATIENT
Start: 2024-10-10

## 2024-10-10 NOTE — TELEPHONE ENCOUNTER
Called patient, let her know that the script is ready, she states that she will  at the  at her apt on 10/16.

## 2024-10-10 NOTE — TELEPHONE ENCOUNTER
Caller: Sd Man    Relationship: Self    Best call back number: [phone]     What is the best time to reach you: ANYTIME    Who are you requesting to speak with (clinical staff, provider,  specific staff member): CLINICAL    What was the call regarding: PATIENT NEEDS A SCRIPT FOR A WIG SO SHE CAN BE REIMBURSED FROM HER INS CO. SHE NEEDS A DIAGNOSIS LISTED ON HERE AS HAIR LOSS FROM CANCER AND NOT ALOPECIA.     PLEASE PUT THIS IN PATIENT'S [facility] CHART, [facility], SO SHE CAN SUBMIT THIS TO HER INSURANCE WITH THE RECEIPT FOR THE WIG.    PLEASE CALL PATIENT WITH ANY QUESTIONS.

## 2024-10-10 NOTE — TELEPHONE ENCOUNTER
Caller: Sd Man    Relationship: Self    Best call back number: [phone]    Requested Prescriptions:   Requested Prescriptions     Pending Prescriptions Disp Refills    OLANZapine (zyPREXA) 5 MG tablet       Sig: Take 1 tablet by mouth Daily.        Pharmacy where request should be sent: [id number] [place], [place] - [address] AT [address] - [phone] [facility] [phone]      Last office visit with prescribing clinician: 9/25/2024   Last telemedicine visit with prescribing clinician: Visit date not found   Next office visit with prescribing clinician: 10/16/2024     Additional details provided by patient:     Does the patient have less than a 3 day supply:  [x] Yes  [] No    Would you like a call back once the refill request has been completed: [] Yes [x] No    If the office needs to give you a call back, can they leave a voicemail: [] Yes [x] No

## 2024-10-11 ENCOUNTER — PATIENT OUTREACH (OUTPATIENT)
Dept: OTHER | Facility: HOSPITAL | Age: 59
End: 2024-10-11
Payer: COMMERCIAL

## 2024-10-16 ENCOUNTER — OFFICE VISIT (OUTPATIENT)
Dept: ONCOLOGY | Facility: CLINIC | Age: 59
End: 2024-10-16
Payer: COMMERCIAL

## 2024-10-16 ENCOUNTER — INFUSION (OUTPATIENT)
Dept: ONCOLOGY | Facility: HOSPITAL | Age: 59
End: 2024-10-16
Payer: COMMERCIAL

## 2024-10-16 VITALS
HEIGHT: 64 IN | DIASTOLIC BLOOD PRESSURE: 81 MMHG | HEART RATE: 98 BPM | RESPIRATION RATE: 16 BRPM | WEIGHT: 252.6 LBS | SYSTOLIC BLOOD PRESSURE: 121 MMHG | BODY MASS INDEX: 43.13 KG/M2 | OXYGEN SATURATION: 98 % | TEMPERATURE: 98 F

## 2024-10-16 VITALS — HEART RATE: 97 BPM | SYSTOLIC BLOOD PRESSURE: 138 MMHG | DIASTOLIC BLOOD PRESSURE: 84 MMHG

## 2024-10-16 DIAGNOSIS — Z45.2 ENCOUNTER FOR FITTING AND ADJUSTMENT OF VASCULAR CATHETER: ICD-10-CM

## 2024-10-16 DIAGNOSIS — Z17.0 MALIGNANT NEOPLASM OF UPPER-OUTER QUADRANT OF LEFT BREAST IN FEMALE, ESTROGEN RECEPTOR POSITIVE: Primary | ICD-10-CM

## 2024-10-16 DIAGNOSIS — C50.412 MALIGNANT NEOPLASM OF UPPER-OUTER QUADRANT OF LEFT BREAST IN FEMALE, ESTROGEN RECEPTOR POSITIVE: Primary | ICD-10-CM

## 2024-10-16 DIAGNOSIS — C50.412 MALIGNANT NEOPLASM OF UPPER-OUTER QUADRANT OF LEFT BREAST IN FEMALE, ESTROGEN RECEPTOR POSITIVE: ICD-10-CM

## 2024-10-16 DIAGNOSIS — Z17.0 MALIGNANT NEOPLASM OF UPPER-OUTER QUADRANT OF LEFT BREAST IN FEMALE, ESTROGEN RECEPTOR POSITIVE: ICD-10-CM

## 2024-10-16 LAB
ALBUMIN SERPL-MCNC: 3.8 G/DL (ref 3.5–5.2)
ALBUMIN/GLOB SERPL: 1.7 G/DL
ALP SERPL-CCNC: 71 U/L (ref 39–117)
ALT SERPL W P-5'-P-CCNC: 64 U/L (ref 1–33)
ANION GAP SERPL CALCULATED.3IONS-SCNC: 11.2 MMOL/L (ref 5–15)
AST SERPL-CCNC: 43 U/L (ref 1–32)
BASOPHILS # BLD AUTO: 0.06 10*3/MM3 (ref 0–0.2)
BASOPHILS NFR BLD AUTO: 1.2 % (ref 0–1.5)
BILIRUB SERPL-MCNC: 0.5 MG/DL (ref 0–1.2)
BUN SERPL-MCNC: 16 MG/DL (ref 6–20)
BUN/CREAT SERPL: 20 (ref 7–25)
CALCIUM SPEC-SCNC: 8.7 MG/DL (ref 8.6–10.5)
CHLORIDE SERPL-SCNC: 105 MMOL/L (ref 98–107)
CO2 SERPL-SCNC: 23.8 MMOL/L (ref 22–29)
CREAT SERPL-MCNC: 0.8 MG/DL (ref 0.57–1)
DEPRECATED RDW RBC AUTO: 65.1 FL (ref 37–54)
EGFRCR SERPLBLD CKD-EPI 2021: 86.1 ML/MIN/1.73
EOSINOPHIL # BLD AUTO: 0.13 10*3/MM3 (ref 0–0.4)
EOSINOPHIL NFR BLD AUTO: 2.7 % (ref 0.3–6.2)
ERYTHROCYTE [DISTWIDTH] IN BLOOD BY AUTOMATED COUNT: 17.1 % (ref 12.3–15.4)
GLOBULIN UR ELPH-MCNC: 2.2 GM/DL
GLUCOSE SERPL-MCNC: 180 MG/DL (ref 65–99)
HCT VFR BLD AUTO: 30.7 % (ref 34–46.6)
HGB BLD-MCNC: 10.6 G/DL (ref 12–15.9)
IMM GRANULOCYTES # BLD AUTO: 0.1 10*3/MM3 (ref 0–0.05)
IMM GRANULOCYTES NFR BLD AUTO: 2.1 % (ref 0–0.5)
LYMPHOCYTES # BLD AUTO: 1.54 10*3/MM3 (ref 0.7–3.1)
LYMPHOCYTES NFR BLD AUTO: 31.8 % (ref 19.6–45.3)
MCH RBC QN AUTO: 35.8 PG (ref 26.6–33)
MCHC RBC AUTO-ENTMCNC: 34.5 G/DL (ref 31.5–35.7)
MCV RBC AUTO: 103.7 FL (ref 79–97)
MONOCYTES # BLD AUTO: 0.4 10*3/MM3 (ref 0.1–0.9)
MONOCYTES NFR BLD AUTO: 8.3 % (ref 5–12)
NEUTROPHILS NFR BLD AUTO: 2.61 10*3/MM3 (ref 1.7–7)
NEUTROPHILS NFR BLD AUTO: 53.9 % (ref 42.7–76)
NRBC BLD AUTO-RTO: 0.4 /100 WBC (ref 0–0.2)
PLATELET # BLD AUTO: 319 10*3/MM3 (ref 140–450)
PMV BLD AUTO: 8.4 FL (ref 6–12)
POTASSIUM SERPL-SCNC: 3.9 MMOL/L (ref 3.5–5.2)
PROT SERPL-MCNC: 6 G/DL (ref 6–8.5)
RBC # BLD AUTO: 2.96 10*6/MM3 (ref 3.77–5.28)
SODIUM SERPL-SCNC: 140 MMOL/L (ref 136–145)
WBC NRBC COR # BLD AUTO: 4.84 10*3/MM3 (ref 3.4–10.8)

## 2024-10-16 PROCEDURE — 25810000003 SODIUM CHLORIDE 0.9 % SOLUTION 250 ML FLEX CONT: Performed by: INTERNAL MEDICINE

## 2024-10-16 PROCEDURE — 25010000002 HEPARIN LOCK FLUSH PER 10 UNITS: Performed by: INTERNAL MEDICINE

## 2024-10-16 PROCEDURE — 25010000002 PACLITAXEL PER 1 MG: Performed by: INTERNAL MEDICINE

## 2024-10-16 PROCEDURE — 99214 OFFICE O/P EST MOD 30 MIN: CPT | Performed by: INTERNAL MEDICINE

## 2024-10-16 PROCEDURE — 80053 COMPREHEN METABOLIC PANEL: CPT

## 2024-10-16 PROCEDURE — 96375 TX/PRO/DX INJ NEW DRUG ADDON: CPT

## 2024-10-16 PROCEDURE — 25010000002 DIPHENHYDRAMINE PER 50 MG: Performed by: INTERNAL MEDICINE

## 2024-10-16 PROCEDURE — 96413 CHEMO IV INFUSION 1 HR: CPT

## 2024-10-16 PROCEDURE — 25010000002 DEXAMETHASONE SODIUM PHOSPHATE 100 MG/10ML SOLUTION: Performed by: INTERNAL MEDICINE

## 2024-10-16 PROCEDURE — 85025 COMPLETE CBC W/AUTO DIFF WBC: CPT

## 2024-10-16 PROCEDURE — 25010000002 ONDANSETRON PER 1 MG: Performed by: INTERNAL MEDICINE

## 2024-10-16 RX ORDER — HEPARIN SODIUM (PORCINE) LOCK FLUSH IV SOLN 100 UNIT/ML 100 UNIT/ML
500 SOLUTION INTRAVENOUS AS NEEDED
OUTPATIENT
Start: 2024-10-16

## 2024-10-16 RX ORDER — ONDANSETRON 2 MG/ML
8 INJECTION INTRAMUSCULAR; INTRAVENOUS ONCE
Status: CANCELLED | OUTPATIENT
Start: 2024-10-16 | End: 2024-10-16

## 2024-10-16 RX ORDER — SODIUM CHLORIDE 0.9 % (FLUSH) 0.9 %
10 SYRINGE (ML) INJECTION AS NEEDED
OUTPATIENT
Start: 2024-10-16

## 2024-10-16 RX ORDER — FAMOTIDINE 10 MG/ML
20 INJECTION, SOLUTION INTRAVENOUS ONCE
Status: CANCELLED | OUTPATIENT
Start: 2024-10-16

## 2024-10-16 RX ORDER — DIPHENHYDRAMINE HYDROCHLORIDE 50 MG/ML
50 INJECTION INTRAMUSCULAR; INTRAVENOUS AS NEEDED
Status: CANCELLED | OUTPATIENT
Start: 2024-10-16

## 2024-10-16 RX ORDER — FAMOTIDINE 10 MG/ML
20 INJECTION, SOLUTION INTRAVENOUS AS NEEDED
Status: CANCELLED | OUTPATIENT
Start: 2024-10-16

## 2024-10-16 RX ORDER — FAMOTIDINE 10 MG/ML
20 INJECTION, SOLUTION INTRAVENOUS ONCE
Status: COMPLETED | OUTPATIENT
Start: 2024-10-16 | End: 2024-10-16

## 2024-10-16 RX ORDER — SODIUM CHLORIDE 0.9 % (FLUSH) 0.9 %
10 SYRINGE (ML) INJECTION AS NEEDED
Status: DISCONTINUED | OUTPATIENT
Start: 2024-10-16 | End: 2024-10-16 | Stop reason: HOSPADM

## 2024-10-16 RX ORDER — DIPHENHYDRAMINE HYDROCHLORIDE 50 MG/ML
25 INJECTION INTRAMUSCULAR; INTRAVENOUS ONCE
Status: COMPLETED | OUTPATIENT
Start: 2024-10-16 | End: 2024-10-16

## 2024-10-16 RX ORDER — SODIUM CHLORIDE 9 MG/ML
20 INJECTION, SOLUTION INTRAVENOUS ONCE
Status: CANCELLED | OUTPATIENT
Start: 2024-10-16

## 2024-10-16 RX ORDER — HEPARIN SODIUM (PORCINE) LOCK FLUSH IV SOLN 100 UNIT/ML 100 UNIT/ML
500 SOLUTION INTRAVENOUS AS NEEDED
Status: DISCONTINUED | OUTPATIENT
Start: 2024-10-16 | End: 2024-10-16 | Stop reason: HOSPADM

## 2024-10-16 RX ADMIN — FAMOTIDINE 20 MG: 10 INJECTION INTRAVENOUS at 09:54

## 2024-10-16 RX ADMIN — ONDANSETRON 8 MG: 2 INJECTION INTRAMUSCULAR; INTRAVENOUS at 10:13

## 2024-10-16 RX ADMIN — DIPHENHYDRAMINE HYDROCHLORIDE 25 MG: 50 INJECTION INTRAMUSCULAR; INTRAVENOUS at 09:55

## 2024-10-16 RX ADMIN — Medication 10 ML: at 11:57

## 2024-10-16 RX ADMIN — PACLITAXEL 170 MG: 6 INJECTION, SOLUTION INTRAVENOUS at 10:50

## 2024-10-16 RX ADMIN — Medication 500 UNITS: at 11:57

## 2024-10-16 RX ADMIN — DEXAMETHASONE SODIUM PHOSPHATE 12 MG: 10 INJECTION, SOLUTION INTRAMUSCULAR; INTRAVENOUS at 09:58

## 2024-10-16 NOTE — NURSING NOTE
Seen per Dr. Chawla at [facility] office; labs drawn and arrived with port accessed.

## 2024-10-16 NOTE — PROGRESS NOTES
Subjective   Sd Man is a 59 y.o. female.  Referred by Dr. Mccain for left breast invasive ductal carcinoma    History of Present Illness     Patient is a 59-year-old postmenopausal lady who presented with a palpable abnormality of the left breast She initially felt and March 2024.  Patient feels like the mass has increased in size since she initially palpated it.  She brought this to the attention of her gynecologist who ordered diagnostic imaging and further workup.    Patient denies any previous abnormal mammograms or breast biopsies.    She has a diagnosis of PCOS and possible diabetes.  Her last normal mammogram prior to the most recent one was in 2015.    Patient reports extremely stressful situation at home with 3 of her daughters having had mental health issues and her ex- with bipolar disorder.  She has been dealing with a lot of stress since 2017.  She currently works as a .  She was working at [employer] in the past but no longer there.    Patient currently sees [facility] and on medications for anxiety and depression.  She also sees a counselor on a regular basis and has an appointment coming up with them.    Her support system is mainly her parents who live in [place] and also she has a few friends here.  During the summer if she were to do chemotherapy she prefers to get that done in [place] and subsequently transferred care back to us.    4/9/2024-bilateral diagnostic mammogram  Breast that heterogeneously dense.  Finding 1 high density irregular mass measuring 20 mm with indistinct margins at 2:00 in the left breast, 11 cm from the nipple.  This indicates the palpable lump in the left breast.  No suspicious abnormalities of the right breast.    Left breast ultrasound  Finding 1.irregular solid mass measuring 20 x 17 x 20 mm in the posterior one third left breast, 2:00, 11 cm from the nipple.  Finding 2.lymph node measures 20 x 9 x 13 mm on the left  breast.  4 mm cortical thickness noted.    Impression  Finding 1.solid mass in the left breast is suspicious, ultrasound-guided biopsy recommended  Finding 2.lymph node in the left breast is suspicious, FNA and possible spring-loaded or limited core biopsy is recommended.    4/16/2024-left breast ultrasound-guided biopsy  1.left breast 2:00-invasive ductal carcinoma  Grade 3  Invasive carcinoma involves multiple tissue cores and measures up to 14 mm  ER +91 to 100% strong  GA +31 to 40% moderate  HER2 negative, 1+  Ki-67 95%    2.lymph node of the left axilla with free-floating fragments of carcinoma, suspicious for metastasis.    5/10/2024-bilateral breast MRI  Biopsy-proven malignancy in the left breast measuring 2.7 cm at 3:00.  No other suspicious findings are seen.  Left axilla lymph node has undergone prior biopsy and shows diffuse cortical thickening suspicious for left axilla lymphadenopathy.  No suspicious abnormalities of the right breast.    5/23/2024-CT of the chest abdomen and pelvis  1.bone island in the right iliac wing, subchondral cyst in the left acetabulum.  2.2 0.4 cm rounded density in the lateral aspect of the left breast with a surgical marker.  2.4 cm left axillary lymph node.  3.12 millimeters subcapsular cyst in the left lobe of the liver, attenuation compatible with serous fluid.  Second likely cyst within the right lobe of the liver measuring 15 mm.  Attenuation compatible with slightly complex fluid.  No grossly suspicious liver lesion identified.  Further imaging of the liver with an ultrasound recommended.  4.no clear evidence of metastatic disease.    5/23/2024-bone scan without any evidence of metastatic disease    Patient presents today to discuss neoadjuvant chemotherapy given the high-grade and high Ki-67 of the tumor although the tumor is strongly ER positive.    She is extremely reluctant to the idea of receiving chemotherapy as she is concerned about feeling sick and she wants  to work for at least 1 year.    Oncotype DX recurrence score on the core biopsy returned at 73 with a greater than 32% risk of distant metastasis with AI or tamoxifen alone.  Significant benefit from chemotherapy noted.    Patient had moved to [place] after the initial diagnosis and initiated neoadjuvant chemotherapy with dose dense Adriamycin and Cytoxan under the care of  at [facility] oncology in [place], [place].  She completed the 4 cycles of dose dense Adriamycin and Cytoxan and currently receiving weekly Taxol.  Chemotherapy initiated on 7/12/2024.  Patient has not had any chemotherapy dose delays or interruptions.    Interval history  Sd returns today for follow-up.  She continues to tolerate Taxol fairly well.  Denies any neuropathy.  Unable to palpate the left breast or left axillary mass.  No nausea or vomiting.  She does have some facial flushing as well as flushing of the chest wall after the Taxol infusions.    The following portions of the patient's history were reviewed and updated as appropriate: allergies, current medications, past family history, past medical history, past social history, past surgical history, and problem list.    Past Medical History:   Diagnosis Date    ADHD     Anxiety     Breast cancer     Depression     Hypoxemia associated with sleep     Insulin resistance     Metabolic syndrome X     Obesity 11/11/2012    CHELO on CPAP 06/17/2024    Home sleep study.  Weight 242 pounds.  Moderate CHELO with AHI 19.4 events per hour.  Low oxygen saturation 66% and sleep-related hypoxia abnormal for 43.4 minutes.    PCOS (polycystic ovarian syndrome)     PTSD (post-traumatic stress disorder)         Past Surgical History:   Procedure Laterality Date    OOPHORECTOMY Bilateral 01/01/2014    VENOUS ACCESS DEVICE (PORT) INSERTION Right 6/20/2024    Procedure: INSERTION VENOUS ACCESS DEVICE;  Surgeon: Moira Mccain MD;  Location: [facility];  Service: General;  Laterality: Right;  "       Family History   Problem Relation Age of Onset    Melanoma Mother 75        alive currently 79    Atrial fibrillation Father     Hypertension Father     Malig Hyperthermia Neg Hx         Social History     Socioeconomic History    Marital status:    Tobacco Use    Smoking status: Never     Passive exposure: Never    Smokeless tobacco: Never   Vaping Use    Vaping status: Never Used   Substance and Sexual Activity    Alcohol use: Never    Drug use: Never    Sexual activity: Not Currently        OB History    No obstetric history on file.      Age at menarche-14  Age at first live childbirth-32   3 para 3  2  Age at menopause-47  Breast-feeding for 2 to 3 years  Oral contraceptive pill use none  Hormone replacement therapy for 2 years    No Known Allergies       Objective   Blood pressure 121/81, pulse 98, temperature 98 °F (36.7 °C), temperature source Oral, resp. rate 16, height 162 cm (63.78\"), weight 115 kg (252 lb 9.6 oz), SpO2 98%.   Physical Exam  Vitals reviewed.   Constitutional:       Appearance: Normal appearance. She is obese.   HENT:      Nose: Nose normal.      Mouth/Throat:      Pharynx: Oropharynx is clear.   Eyes:      Conjunctiva/sclera: Conjunctivae normal.   Cardiovascular:      Rate and Rhythm: Normal rate and regular rhythm.   Pulmonary:      Effort: Pulmonary effort is normal.   Abdominal:      General: Abdomen is flat.   Musculoskeletal:         General: Normal range of motion.      Cervical back: Normal range of motion.   Skin:     General: Skin is warm and dry.   Neurological:      General: No focal deficit present.      Mental Status: She is alert and oriented to person, place, and time.   Psychiatric:         Mood and Affect: Mood normal.         Behavior: Behavior normal.         Thought Content: Thought content normal.         Judgment: Judgment normal.         Breast exam:  Right breast: Appears normal on inspection.  No palpable abnormalities of the right " breast.  Left breast: On inspection no significant abnormalities other than biopsy site changes.  The left axillary mass at 2:00 as well as the left axillary lymphadenopathy is no longer palpable.  No cervical or supraclavicular lymphadenopathy.    I have reexamined the patient and the results are consistent with the previously documented exam. Kristan Chawla MD      Labs:  Results from last 7 days   Lab Units 10/16/24  0831 10/09/24  0900   WBC 10*3/mm3 4.84 6.30   NEUTROS ABS 10*3/mm3 2.61 3.61   HEMOGLOBIN g/dL 10.6* 10.1*   HEMATOCRIT % 30.7* 29.6*   PLATELETS 10*3/mm3 319 340     Results from last 7 days   Lab Units 10/09/24  0900   SODIUM mmol/L 141   POTASSIUM mmol/L 3.8   CHLORIDE mmol/L 106   CO2 mmol/L 24.0   BUN mg/dL 16   CREATININE mg/dL 0.78   CALCIUM mg/dL 9.1   ALBUMIN g/dL 4.0   BILIRUBIN mg/dL 0.3   ALK PHOS U/L 105   ALT (SGPT) U/L 85*   AST (SGOT) U/L 42*   GLUCOSE mg/dL 152*           No radiology results for the last 30 days.     MRI of the breast images independently reviewed interpreted by me in detail summarized above  CT chest abdomen pelvis and bone scan images independently reviewed and interpreted by me in detail summarized above    6/4/2024-ultrasound of the liver-echogenic liver consistent with fatty infiltration  1.6 cm benign-appearing right hepatic lobe cyst.    Reviewed ultrasound images independently and interpreted them independently.    Assessment & Plan   *Left breast invasive ductal carcinoma  Clinical T2 N1a M0, Anatomic stage IIb, prognostic stage IIa.  The tumor is grade 3, Ki-67 95%, ER +91 to 100% strong, IN +31 to 40% moderate.  Oncotype DX recurrence score of the tumor noted to be 73 with a greater than 32% risk of distant metastasis with AI or tamoxifen alone.  Significant benefit from chemotherapy.  Initiated dose dense Adriamycin and Cytoxan on 7/12/2024 at [facility] under the care of Dr. Cash  Noted to have a significant response in the breast after  initiation of neoadjuvant chemotherapy.  10/16/2024-continue with weekly Taxol.  Due for week 7 today.  CBC reviewed and overall stable except for anemia, LFTs improved, proceed with chemotherapy    *Anemia  Hemoglobin has decreased to 9.9, secondary to chemotherapy  Continue to monitor  Patient reports fatigue as a result of anemia  10/16/2024-hemoglobin stable at 10.6    *Severe anxiety and suicidal thoughts  She is a current patient of [facility].  She sees a psychiatric nurse practitioner as well as a counselor and has an appointment with them.  Currently on Trintellix 10 mg.  Severe stressors as reported in the HPI.  10/2/2024: No complaints of this today.     *Obesity  Class III  BMI 43.7    *Elevated Liver enzymes  10/2/2024: ALT 90 and AST 53. Will continue to monitor. Advised against tylenol.   10/16/2024-LFTs have improved With an ALT of 64 and AST of 43.      *Possible diabetes-she will require follow-up with her primary care physician for management of the same.      *Cardiac health-she has seen Dr. Syed and echocardiogram has been obtained which shows an ejection fraction of 65% with a GLS of -19.6%      Plan:  Proceed with week 7 of Taxol  JAMES in 3 weeks  MD in 5 weeks   Continue to monitor LFTs closely.    JAMES Orlando

## 2024-10-22 ENCOUNTER — TELEPHONE (OUTPATIENT)
Dept: SURGERY | Facility: CLINIC | Age: 59
End: 2024-10-22
Payer: COMMERCIAL

## 2024-10-22 NOTE — TELEPHONE ENCOUNTER
Patient called stating she is done with chemotherapy and is now ready for surgery in 3-4 weeks. I let  know this and will be calling her back when I have more info.      [place]

## 2024-10-24 ENCOUNTER — INFUSION (OUTPATIENT)
Dept: ONCOLOGY | Facility: HOSPITAL | Age: 59
End: 2024-10-24
Payer: COMMERCIAL

## 2024-10-24 VITALS
OXYGEN SATURATION: 96 % | DIASTOLIC BLOOD PRESSURE: 80 MMHG | BODY MASS INDEX: 43.23 KG/M2 | TEMPERATURE: 97.4 F | HEIGHT: 64 IN | RESPIRATION RATE: 15 BRPM | WEIGHT: 253.2 LBS | HEART RATE: 99 BPM | SYSTOLIC BLOOD PRESSURE: 131 MMHG

## 2024-10-24 DIAGNOSIS — Z45.2 ENCOUNTER FOR FITTING AND ADJUSTMENT OF VASCULAR CATHETER: ICD-10-CM

## 2024-10-24 DIAGNOSIS — C50.412 MALIGNANT NEOPLASM OF UPPER-OUTER QUADRANT OF LEFT BREAST IN FEMALE, ESTROGEN RECEPTOR POSITIVE: Primary | ICD-10-CM

## 2024-10-24 DIAGNOSIS — Z17.0 MALIGNANT NEOPLASM OF UPPER-OUTER QUADRANT OF LEFT BREAST IN FEMALE, ESTROGEN RECEPTOR POSITIVE: Primary | ICD-10-CM

## 2024-10-24 LAB
ALBUMIN SERPL-MCNC: 3.9 G/DL (ref 3.5–5.2)
ALBUMIN/GLOB SERPL: 1.7 G/DL
ALP SERPL-CCNC: 81 U/L (ref 39–117)
ALT SERPL W P-5'-P-CCNC: 45 U/L (ref 1–33)
ANION GAP SERPL CALCULATED.3IONS-SCNC: 10.2 MMOL/L (ref 5–15)
ANISOCYTOSIS BLD QL: ABNORMAL
AST SERPL-CCNC: 31 U/L (ref 1–32)
BASOPHILS # BLD MANUAL: 0.11 10*3/MM3 (ref 0–0.2)
BASOPHILS NFR BLD MANUAL: 2 % (ref 0–1.5)
BILIRUB SERPL-MCNC: 0.5 MG/DL (ref 0–1.2)
BUN SERPL-MCNC: 15 MG/DL (ref 6–20)
BUN/CREAT SERPL: 21.4 (ref 7–25)
CALCIUM SPEC-SCNC: 8.9 MG/DL (ref 8.6–10.5)
CHLORIDE SERPL-SCNC: 103 MMOL/L (ref 98–107)
CO2 SERPL-SCNC: 25.8 MMOL/L (ref 22–29)
CREAT SERPL-MCNC: 0.7 MG/DL (ref 0.57–1)
DACRYOCYTES BLD QL SMEAR: ABNORMAL
DEPRECATED RDW RBC AUTO: 60.5 FL (ref 37–54)
EGFRCR SERPLBLD CKD-EPI 2021: 101 ML/MIN/1.73
EOSINOPHIL # BLD MANUAL: 0.11 10*3/MM3 (ref 0–0.4)
EOSINOPHIL NFR BLD MANUAL: 2 % (ref 0.3–6.2)
ERYTHROCYTE [DISTWIDTH] IN BLOOD BY AUTOMATED COUNT: 16.3 % (ref 12.3–15.4)
GLOBULIN UR ELPH-MCNC: 2.3 GM/DL
GLUCOSE SERPL-MCNC: 156 MG/DL (ref 65–99)
HCT VFR BLD AUTO: 30.4 % (ref 34–46.6)
HGB BLD-MCNC: 10.3 G/DL (ref 12–15.9)
LYMPHOCYTES # BLD MANUAL: 1.28 10*3/MM3 (ref 0.7–3.1)
LYMPHOCYTES NFR BLD MANUAL: 7 % (ref 5–12)
MCH RBC QN AUTO: 34.7 PG (ref 26.6–33)
MCHC RBC AUTO-ENTMCNC: 33.9 G/DL (ref 31.5–35.7)
MCV RBC AUTO: 102.4 FL (ref 79–97)
MONOCYTES # BLD: 0.37 10*3/MM3 (ref 0.1–0.9)
NEUTROPHILS # BLD AUTO: 3.46 10*3/MM3 (ref 1.7–7)
NEUTROPHILS NFR BLD MANUAL: 65 % (ref 42.7–76)
PLAT MORPH BLD: NORMAL
PLATELET # BLD AUTO: 312 10*3/MM3 (ref 140–450)
PMV BLD AUTO: 8.8 FL (ref 6–12)
POTASSIUM SERPL-SCNC: 4 MMOL/L (ref 3.5–5.2)
PROT SERPL-MCNC: 6.2 G/DL (ref 6–8.5)
RBC # BLD AUTO: 2.97 10*6/MM3 (ref 3.77–5.28)
SODIUM SERPL-SCNC: 139 MMOL/L (ref 136–145)
VARIANT LYMPHS NFR BLD MANUAL: 24 % (ref 19.6–45.3)
WBC MORPH BLD: NORMAL
WBC NRBC COR # BLD AUTO: 5.33 10*3/MM3 (ref 3.4–10.8)

## 2024-10-24 PROCEDURE — 85007 BL SMEAR W/DIFF WBC COUNT: CPT | Performed by: INTERNAL MEDICINE

## 2024-10-24 PROCEDURE — 25810000003 SODIUM CHLORIDE 0.9 % SOLUTION: Performed by: INTERNAL MEDICINE

## 2024-10-24 PROCEDURE — 25010000002 DIPHENHYDRAMINE PER 50 MG: Performed by: INTERNAL MEDICINE

## 2024-10-24 PROCEDURE — 25010000002 DEXAMETHASONE SODIUM PHOSPHATE 100 MG/10ML SOLUTION: Performed by: INTERNAL MEDICINE

## 2024-10-24 PROCEDURE — 85025 COMPLETE CBC W/AUTO DIFF WBC: CPT | Performed by: INTERNAL MEDICINE

## 2024-10-24 PROCEDURE — 96413 CHEMO IV INFUSION 1 HR: CPT

## 2024-10-24 PROCEDURE — 25010000002 PACLITAXEL PER 1 MG: Performed by: NURSE PRACTITIONER

## 2024-10-24 PROCEDURE — 25810000003 SODIUM CHLORIDE 0.9 % SOLUTION 250 ML FLEX CONT: Performed by: NURSE PRACTITIONER

## 2024-10-24 PROCEDURE — 25010000002 HEPARIN LOCK FLUSH PER 10 UNITS: Performed by: INTERNAL MEDICINE

## 2024-10-24 PROCEDURE — 25010000002 ONDANSETRON PER 1 MG: Performed by: INTERNAL MEDICINE

## 2024-10-24 PROCEDURE — 80053 COMPREHEN METABOLIC PANEL: CPT | Performed by: INTERNAL MEDICINE

## 2024-10-24 PROCEDURE — 96375 TX/PRO/DX INJ NEW DRUG ADDON: CPT

## 2024-10-24 RX ORDER — SODIUM CHLORIDE 9 MG/ML
20 INJECTION, SOLUTION INTRAVENOUS ONCE
Status: COMPLETED | OUTPATIENT
Start: 2024-10-24 | End: 2024-10-24

## 2024-10-24 RX ORDER — SODIUM CHLORIDE 0.9 % (FLUSH) 0.9 %
10 SYRINGE (ML) INJECTION AS NEEDED
Status: DISCONTINUED | OUTPATIENT
Start: 2024-10-24 | End: 2024-10-24 | Stop reason: HOSPADM

## 2024-10-24 RX ORDER — SODIUM CHLORIDE 0.9 % (FLUSH) 0.9 %
10 SYRINGE (ML) INJECTION AS NEEDED
OUTPATIENT
Start: 2024-10-24

## 2024-10-24 RX ORDER — HEPARIN SODIUM (PORCINE) LOCK FLUSH IV SOLN 100 UNIT/ML 100 UNIT/ML
500 SOLUTION INTRAVENOUS AS NEEDED
OUTPATIENT
Start: 2024-10-24

## 2024-10-24 RX ORDER — FAMOTIDINE 10 MG/ML
20 INJECTION, SOLUTION INTRAVENOUS ONCE
Status: COMPLETED | OUTPATIENT
Start: 2024-10-24 | End: 2024-10-24

## 2024-10-24 RX ORDER — DIPHENHYDRAMINE HYDROCHLORIDE 50 MG/ML
25 INJECTION INTRAMUSCULAR; INTRAVENOUS ONCE
Status: COMPLETED | OUTPATIENT
Start: 2024-10-24 | End: 2024-10-24

## 2024-10-24 RX ORDER — HEPARIN SODIUM (PORCINE) LOCK FLUSH IV SOLN 100 UNIT/ML 100 UNIT/ML
500 SOLUTION INTRAVENOUS AS NEEDED
Status: DISCONTINUED | OUTPATIENT
Start: 2024-10-24 | End: 2024-10-24 | Stop reason: HOSPADM

## 2024-10-24 RX ADMIN — FAMOTIDINE 20 MG: 10 INJECTION INTRAVENOUS at 14:29

## 2024-10-24 RX ADMIN — SODIUM CHLORIDE 20 ML/HR: 9 INJECTION, SOLUTION INTRAVENOUS at 13:49

## 2024-10-24 RX ADMIN — PACLITAXEL 170 MG: 6 INJECTION, SOLUTION INTRAVENOUS at 14:55

## 2024-10-24 RX ADMIN — DIPHENHYDRAMINE HYDROCHLORIDE 25 MG: 50 INJECTION INTRAMUSCULAR; INTRAVENOUS at 13:50

## 2024-10-24 RX ADMIN — ONDANSETRON 8 MG: 2 INJECTION INTRAMUSCULAR; INTRAVENOUS at 13:52

## 2024-10-24 RX ADMIN — Medication 10 ML: at 15:58

## 2024-10-24 RX ADMIN — DEXAMETHASONE SODIUM PHOSPHATE 12 MG: 10 INJECTION, SOLUTION INTRAMUSCULAR; INTRAVENOUS at 14:12

## 2024-10-24 RX ADMIN — Medication 500 UNITS: at 15:58

## 2024-10-25 ENCOUNTER — TELEPHONE (OUTPATIENT)
Dept: SURGERY | Facility: CLINIC | Age: 59
End: 2024-10-25
Payer: COMMERCIAL

## 2024-10-31 ENCOUNTER — INFUSION (OUTPATIENT)
Dept: ONCOLOGY | Facility: HOSPITAL | Age: 59
End: 2024-10-31
Payer: COMMERCIAL

## 2024-10-31 VITALS
WEIGHT: 253 LBS | BODY MASS INDEX: 43.19 KG/M2 | DIASTOLIC BLOOD PRESSURE: 90 MMHG | OXYGEN SATURATION: 95 % | HEIGHT: 64 IN | TEMPERATURE: 96.9 F | RESPIRATION RATE: 15 BRPM | SYSTOLIC BLOOD PRESSURE: 131 MMHG | HEART RATE: 109 BPM

## 2024-10-31 DIAGNOSIS — Z45.2 ENCOUNTER FOR FITTING AND ADJUSTMENT OF VASCULAR CATHETER: ICD-10-CM

## 2024-10-31 DIAGNOSIS — C50.412 MALIGNANT NEOPLASM OF UPPER-OUTER QUADRANT OF LEFT BREAST IN FEMALE, ESTROGEN RECEPTOR POSITIVE: Primary | ICD-10-CM

## 2024-10-31 DIAGNOSIS — Z17.0 MALIGNANT NEOPLASM OF UPPER-OUTER QUADRANT OF LEFT BREAST IN FEMALE, ESTROGEN RECEPTOR POSITIVE: Primary | ICD-10-CM

## 2024-10-31 LAB
ALBUMIN SERPL-MCNC: 4 G/DL (ref 3.5–5.2)
ALBUMIN/GLOB SERPL: 1.6 G/DL
ALP SERPL-CCNC: 95 U/L (ref 39–117)
ALT SERPL W P-5'-P-CCNC: 52 U/L (ref 1–33)
ANION GAP SERPL CALCULATED.3IONS-SCNC: 10.1 MMOL/L (ref 5–15)
AST SERPL-CCNC: 30 U/L (ref 1–32)
BASOPHILS # BLD AUTO: 0.08 10*3/MM3 (ref 0–0.2)
BASOPHILS NFR BLD AUTO: 1.3 % (ref 0–1.5)
BILIRUB SERPL-MCNC: 0.6 MG/DL (ref 0–1.2)
BUN SERPL-MCNC: 20 MG/DL (ref 6–20)
BUN/CREAT SERPL: 26 (ref 7–25)
CALCIUM SPEC-SCNC: 9.3 MG/DL (ref 8.6–10.5)
CHLORIDE SERPL-SCNC: 102 MMOL/L (ref 98–107)
CO2 SERPL-SCNC: 25.9 MMOL/L (ref 22–29)
CREAT SERPL-MCNC: 0.77 MG/DL (ref 0.57–1)
DEPRECATED RDW RBC AUTO: 55.6 FL (ref 37–54)
EGFRCR SERPLBLD CKD-EPI 2021: 90.1 ML/MIN/1.73
EOSINOPHIL # BLD AUTO: 0.19 10*3/MM3 (ref 0–0.4)
EOSINOPHIL NFR BLD AUTO: 3.2 % (ref 0.3–6.2)
ERYTHROCYTE [DISTWIDTH] IN BLOOD BY AUTOMATED COUNT: 14.6 % (ref 12.3–15.4)
GLOBULIN UR ELPH-MCNC: 2.5 GM/DL
GLUCOSE SERPL-MCNC: 173 MG/DL (ref 65–99)
HCT VFR BLD AUTO: 32.2 % (ref 34–46.6)
HGB BLD-MCNC: 10.8 G/DL (ref 12–15.9)
IMM GRANULOCYTES # BLD AUTO: 0.19 10*3/MM3 (ref 0–0.05)
IMM GRANULOCYTES NFR BLD AUTO: 3.2 % (ref 0–0.5)
LYMPHOCYTES # BLD AUTO: 1.76 10*3/MM3 (ref 0.7–3.1)
LYMPHOCYTES NFR BLD AUTO: 29.3 % (ref 19.6–45.3)
MCH RBC QN AUTO: 34.6 PG (ref 26.6–33)
MCHC RBC AUTO-ENTMCNC: 33.5 G/DL (ref 31.5–35.7)
MCV RBC AUTO: 103.2 FL (ref 79–97)
MONOCYTES # BLD AUTO: 0.54 10*3/MM3 (ref 0.1–0.9)
MONOCYTES NFR BLD AUTO: 9 % (ref 5–12)
NEUTROPHILS NFR BLD AUTO: 3.25 10*3/MM3 (ref 1.7–7)
NEUTROPHILS NFR BLD AUTO: 54 % (ref 42.7–76)
NRBC BLD AUTO-RTO: 0.3 /100 WBC (ref 0–0.2)
PLATELET # BLD AUTO: 345 10*3/MM3 (ref 140–450)
PMV BLD AUTO: 8.7 FL (ref 6–12)
POTASSIUM SERPL-SCNC: 3.9 MMOL/L (ref 3.5–5.2)
PROT SERPL-MCNC: 6.5 G/DL (ref 6–8.5)
RBC # BLD AUTO: 3.12 10*6/MM3 (ref 3.77–5.28)
SODIUM SERPL-SCNC: 138 MMOL/L (ref 136–145)
WBC NRBC COR # BLD AUTO: 6.01 10*3/MM3 (ref 3.4–10.8)

## 2024-10-31 PROCEDURE — 96413 CHEMO IV INFUSION 1 HR: CPT

## 2024-10-31 PROCEDURE — 85025 COMPLETE CBC W/AUTO DIFF WBC: CPT | Performed by: INTERNAL MEDICINE

## 2024-10-31 PROCEDURE — 25010000002 ONDANSETRON PER 1 MG: Performed by: INTERNAL MEDICINE

## 2024-10-31 PROCEDURE — 96375 TX/PRO/DX INJ NEW DRUG ADDON: CPT

## 2024-10-31 PROCEDURE — 25010000002 DEXAMETHASONE SODIUM PHOSPHATE 100 MG/10ML SOLUTION: Performed by: INTERNAL MEDICINE

## 2024-10-31 PROCEDURE — 80053 COMPREHEN METABOLIC PANEL: CPT | Performed by: INTERNAL MEDICINE

## 2024-10-31 PROCEDURE — 25010000002 HEPARIN LOCK FLUSH PER 10 UNITS: Performed by: INTERNAL MEDICINE

## 2024-10-31 PROCEDURE — 25010000002 DIPHENHYDRAMINE PER 50 MG: Performed by: INTERNAL MEDICINE

## 2024-10-31 PROCEDURE — 25810000003 SODIUM CHLORIDE 0.9 % SOLUTION 250 ML FLEX CONT: Performed by: NURSE PRACTITIONER

## 2024-10-31 PROCEDURE — 25010000002 PACLITAXEL PER 1 MG: Performed by: NURSE PRACTITIONER

## 2024-10-31 RX ORDER — DIPHENHYDRAMINE HYDROCHLORIDE 50 MG/ML
25 INJECTION INTRAMUSCULAR; INTRAVENOUS ONCE
Status: COMPLETED | OUTPATIENT
Start: 2024-10-31 | End: 2024-10-31

## 2024-10-31 RX ORDER — SODIUM CHLORIDE 0.9 % (FLUSH) 0.9 %
10 SYRINGE (ML) INJECTION AS NEEDED
OUTPATIENT
Start: 2024-10-31

## 2024-10-31 RX ORDER — ONDANSETRON 2 MG/ML
8 INJECTION INTRAMUSCULAR; INTRAVENOUS ONCE
Status: COMPLETED | OUTPATIENT
Start: 2024-10-31 | End: 2024-10-31

## 2024-10-31 RX ORDER — DIPHENHYDRAMINE HYDROCHLORIDE 50 MG/ML
50 INJECTION INTRAMUSCULAR; INTRAVENOUS AS NEEDED
Status: CANCELLED | OUTPATIENT
Start: 2024-10-31

## 2024-10-31 RX ORDER — SODIUM CHLORIDE 9 MG/ML
20 INJECTION, SOLUTION INTRAVENOUS ONCE
Status: CANCELLED | OUTPATIENT
Start: 2024-10-31

## 2024-10-31 RX ORDER — FAMOTIDINE 10 MG/ML
20 INJECTION, SOLUTION INTRAVENOUS AS NEEDED
Status: CANCELLED | OUTPATIENT
Start: 2024-10-31

## 2024-10-31 RX ORDER — HEPARIN SODIUM (PORCINE) LOCK FLUSH IV SOLN 100 UNIT/ML 100 UNIT/ML
500 SOLUTION INTRAVENOUS AS NEEDED
OUTPATIENT
Start: 2024-10-31

## 2024-10-31 RX ORDER — SODIUM CHLORIDE 0.9 % (FLUSH) 0.9 %
10 SYRINGE (ML) INJECTION AS NEEDED
Status: DISCONTINUED | OUTPATIENT
Start: 2024-10-31 | End: 2024-10-31 | Stop reason: HOSPADM

## 2024-10-31 RX ORDER — HEPARIN SODIUM (PORCINE) LOCK FLUSH IV SOLN 100 UNIT/ML 100 UNIT/ML
500 SOLUTION INTRAVENOUS AS NEEDED
Status: DISCONTINUED | OUTPATIENT
Start: 2024-10-31 | End: 2024-10-31 | Stop reason: HOSPADM

## 2024-10-31 RX ORDER — FAMOTIDINE 10 MG/ML
20 INJECTION, SOLUTION INTRAVENOUS ONCE
Status: COMPLETED | OUTPATIENT
Start: 2024-10-31 | End: 2024-10-31

## 2024-10-31 RX ADMIN — ONDANSETRON 8 MG: 2 INJECTION INTRAMUSCULAR; INTRAVENOUS at 10:16

## 2024-10-31 RX ADMIN — PACLITAXEL 170 MG: 6 INJECTION, SOLUTION INTRAVENOUS at 10:54

## 2024-10-31 RX ADMIN — DEXAMETHASONE SODIUM PHOSPHATE 12 MG: 10 INJECTION, SOLUTION INTRAMUSCULAR; INTRAVENOUS at 10:19

## 2024-10-31 RX ADMIN — FAMOTIDINE 20 MG: 10 INJECTION INTRAVENOUS at 10:13

## 2024-10-31 RX ADMIN — Medication 500 UNITS: at 11:56

## 2024-10-31 RX ADMIN — Medication 10 ML: at 11:56

## 2024-10-31 RX ADMIN — DIPHENHYDRAMINE HYDROCHLORIDE 25 MG: 50 INJECTION INTRAMUSCULAR; INTRAVENOUS at 10:15

## 2024-11-03 NOTE — PROGRESS NOTES
Subjective   Sd Man is a 59 y.o. female.  Referred by Dr. Mccain for left breast invasive ductal carcinoma    History of Present Illness     Patient is a 59-year-old postmenopausal lady who presented with a palpable abnormality of the left breast She initially felt and March 2024.  Patient feels like the mass has increased in size since she initially palpated it.  She brought this to the attention of her gynecologist who ordered diagnostic imaging and further workup.    Patient denies any previous abnormal mammograms or breast biopsies.    She has a diagnosis of PCOS and possible diabetes.  Her last normal mammogram prior to the most recent one was in 2015.    Patient reports extremely stressful situation at home with 3 of her daughters having had mental health issues and her ex- with bipolar disorder.  She has been dealing with a lot of stress since 2017.  She currently works as a .  She was working at [employer] in the past but no longer there.    Patient currently sees [facility] and on medications for anxiety and depression.  She also sees a counselor on a regular basis and has an appointment coming up with them.    Her support system is mainly her parents who live in [place] and also she has a few friends here.  During the summer if she were to do chemotherapy she prefers to get that done in [place] and subsequently transferred care back to us.    4/9/2024-bilateral diagnostic mammogram  Breast that heterogeneously dense.  Finding 1 high density irregular mass measuring 20 mm with indistinct margins at 2:00 in the left breast, 11 cm from the nipple.  This indicates the palpable lump in the left breast.  No suspicious abnormalities of the right breast.    Left breast ultrasound  Finding 1.irregular solid mass measuring 20 x 17 x 20 mm in the posterior one third left breast, 2:00, 11 cm from the nipple.  Finding 2.lymph node measures 20 x 9 x 13 mm on the left  breast.  4 mm cortical thickness noted.    Impression  Finding 1.solid mass in the left breast is suspicious, ultrasound-guided biopsy recommended  Finding 2.lymph node in the left breast is suspicious, FNA and possible spring-loaded or limited core biopsy is recommended.    4/16/2024-left breast ultrasound-guided biopsy  1.left breast 2:00-invasive ductal carcinoma  Grade 3  Invasive carcinoma involves multiple tissue cores and measures up to 14 mm  ER +91 to 100% strong  IN +31 to 40% moderate  HER2 negative, 1+  Ki-67 95%    2.lymph node of the left axilla with free-floating fragments of carcinoma, suspicious for metastasis.    5/10/2024-bilateral breast MRI  Biopsy-proven malignancy in the left breast measuring 2.7 cm at 3:00.  No other suspicious findings are seen.  Left axilla lymph node has undergone prior biopsy and shows diffuse cortical thickening suspicious for left axilla lymphadenopathy.  No suspicious abnormalities of the right breast.    5/23/2024-CT of the chest abdomen and pelvis  1.bone island in the right iliac wing, subchondral cyst in the left acetabulum.  2.2 0.4 cm rounded density in the lateral aspect of the left breast with a surgical marker.  2.4 cm left axillary lymph node.  3.12 millimeters subcapsular cyst in the left lobe of the liver, attenuation compatible with serous fluid.  Second likely cyst within the right lobe of the liver measuring 15 mm.  Attenuation compatible with slightly complex fluid.  No grossly suspicious liver lesion identified.  Further imaging of the liver with an ultrasound recommended.  4.no clear evidence of metastatic disease.    5/23/2024-bone scan without any evidence of metastatic disease    Patient presents today to discuss neoadjuvant chemotherapy given the high-grade and high Ki-67 of the tumor although the tumor is strongly ER positive.    She is extremely reluctant to the idea of receiving chemotherapy as she is concerned about feeling sick and she wants  to work for at least 1 year.    Oncotype DX recurrence score on the core biopsy returned at 73 with a greater than 32% risk of distant metastasis with AI or tamoxifen alone.  Significant benefit from chemotherapy noted.    Patient had moved to [place] after the initial diagnosis and initiated neoadjuvant chemotherapy with dose dense Adriamycin and Cytoxan under the care of  at [facility] oncology in [place], [place].  She completed the 4 cycles of dose dense Adriamycin and Cytoxan and currently receiving weekly Taxol.  Chemotherapy initiated on 7/12/2024.  Patient has not had any chemotherapy dose delays or interruptions.    Interval history  Sd returns today for follow-up and evaluation prior to week 10 taxol.  She returned to work on Wednesday, reports this went well.  She does continue to feel fatigued, this is remained stable.  She has started to notice some discoloration in her fingernails, particularly on her thumbs.  She denies any signs or symptoms of peripheral neuropathy.  Continues with flushing after Taxol infusions.  Appetite adequate.  Does have occasional diarrhea, this is tolerable and does not require antidiarrheal's.  Denies fever or chills.  Denies nausea or vomiting.  Denies new or worsening pain.  She is unable to palpate breast mass.    The following portions of the patient's history were reviewed and updated as appropriate: allergies, current medications, past family history, past medical history, past social history, past surgical history, and problem list.    Past Medical History:   Diagnosis Date    ADHD     Anxiety     Breast cancer     Depression     Hypoxemia associated with sleep     Insulin resistance     Metabolic syndrome X     Obesity 11/11/2012    CHELO on CPAP 06/17/2024    Home sleep study.  Weight 242 pounds.  Moderate CHELO with AHI 19.4 events per hour.  Low oxygen saturation 66% and sleep-related hypoxia abnormal for 43.4 minutes.    PCOS (polycystic ovarian  "syndrome)     PTSD (post-traumatic stress disorder)         Past Surgical History:   Procedure Laterality Date    OOPHORECTOMY Bilateral 2014    VENOUS ACCESS DEVICE (PORT) INSERTION Right 2024    Procedure: INSERTION VENOUS ACCESS DEVICE;  Surgeon: Moira Mccain MD;  Location: [facility] [place] [facility] OR;  Service: General;  Laterality: Right;        Family History   Problem Relation Age of Onset    Melanoma Mother 75        alive currently 79    Atrial fibrillation Father     Hypertension Father     Malig Hyperthermia Neg Hx         Social History     Socioeconomic History    Marital status:    Tobacco Use    Smoking status: Never     Passive exposure: Never    Smokeless tobacco: Never   Vaping Use    Vaping status: Never Used   Substance and Sexual Activity    Alcohol use: Never    Drug use: Never    Sexual activity: Not Currently        OB History    No obstetric history on file.      Age at menarche-14  Age at first live childbirth-32   3 para 3  0  Age at menopause-47  Breast-feeding for 2 to 3 years  Oral contraceptive pill use none  Hormone replacement therapy for 2 years    No Known Allergies       Objective   Blood pressure 115/80, pulse 105, temperature 98.6 °F (37 °C), temperature source Oral, resp. rate 16, height 162 cm (63.78\"), weight 114 kg (251 lb 14.4 oz), SpO2 96%.     Physical Exam  Vitals reviewed.   Constitutional:       Appearance: Normal appearance. She is obese.   HENT:      Nose: Nose normal.      Mouth/Throat:      Pharynx: Oropharynx is clear.   Eyes:      Conjunctiva/sclera: Conjunctivae normal.   Cardiovascular:      Rate and Rhythm: Normal rate and regular rhythm.   Pulmonary:      Effort: Pulmonary effort is normal.   Abdominal:      General: Abdomen is flat.   Musculoskeletal:         General: Normal range of motion.      Cervical back: Normal range of motion.   Skin:     General: Skin is warm and dry.   Neurological:      General: No focal deficit present.      " Mental Status: She is alert and oriented to person, place, and time.   Psychiatric:         Mood and Affect: Mood normal.         Behavior: Behavior normal.         Thought Content: Thought content normal.         Judgment: Judgment normal.         Breast exam:  Right breast: Appears normal on inspection.  No palpable abnormalities of the right breast.  Left breast: On inspection no significant abnormalities other than biopsy site changes.  The left axillary mass at 2:00 as well as the left axillary lymphadenopathy is no longer palpable.  No cervical or supraclavicular lymphadenopathy.    I have reexamined the patient and the results are consistent with the previously documented exam. JAMES Guzman      Labs:  Results from last 7 days   Lab Units 11/07/24  0926   WBC 10*3/mm3 6.88   NEUTROS ABS 10*3/mm3 4.48   HEMOGLOBIN g/dL 11.2*   HEMATOCRIT % 32.9*   PLATELETS 10*3/mm3 332     Results from last 7 days   Lab Units 11/07/24  0926   SODIUM mmol/L 140   POTASSIUM mmol/L 4.1   CHLORIDE mmol/L 105   CO2 mmol/L 25.4   BUN mg/dL 15   CREATININE mg/dL 0.69   CALCIUM mg/dL 9.1   ALBUMIN g/dL 3.9   BILIRUBIN mg/dL 0.4   ALK PHOS U/L 85   ALT (SGPT) U/L 53*   AST (SGOT) U/L 34*   GLUCOSE mg/dL 150*           No radiology results for the last 30 days.     MRI of the breast images independently reviewed interpreted by me in detail summarized above  CT chest abdomen pelvis and bone scan images independently reviewed and interpreted by me in detail summarized above    6/4/2024-ultrasound of the liver-echogenic liver consistent with fatty infiltration  1.6 cm benign-appearing right hepatic lobe cyst.    Reviewed ultrasound images independently and interpreted them independently.    Assessment & Plan   *Left breast invasive ductal carcinoma  Clinical T2 N1a M0, Anatomic stage IIb, prognostic stage IIa.  The tumor is grade 3, Ki-67 95%, ER +91 to 100% strong, OR +31 to 40% moderate.  Oncotype DX recurrence score of the  tumor noted to be 73 with a greater than 32% risk of distant metastasis with AI or tamoxifen alone.  Significant benefit from chemotherapy.  Initiated dose dense Adriamycin and Cytoxan on 7/12/2024 at [facility] under the care of Dr. Cash  Noted to have a significant response in the breast after initiation of neoadjuvant chemotherapy.  10/16/2024-continue with weekly Taxol.  Due for week 7 today.  11/7/2024: week 10 taxol.  Continues with some fatigue, overall good tolerance.    *Anemia  Hemoglobin has decreased to 9.9, secondary to chemotherapy  Continue to monitor  Patient reports fatigue as a result of anemia  Hemoglobin today 11.2.    *Severe anxiety and suicidal thoughts  She is a current patient of [facility].  She sees a psychiatric nurse practitioner as well as a counselor and has an appointment with them.  Currently on Trintellix 10 mg.  Severe stressors as reported in the HPI.  11/7/2024: Reports increased stress and anxiety related to the recent election.    *Obesity  Class III  BMI 43.7    *Elevated Liver enzymes  10/2/2024: ALT 90 and AST 53. Will continue to monitor. Advised against tylenol.   10/16/2024-LFTs have improved With an ALT of 64 and AST of 43.  11/7/2024: AST 34, ALT 53.    *Possible diabetes-she will require follow-up with her primary care physician for management of the same.    *Cardiac health-she has seen Dr. Syed and echocardiogram has been obtained which shows an ejection fraction of 65% with a GLS of -19.6%    Plan:   Proceed with week 10 of Taxol today.  Return in 1 week for week 11 taxol.  Return in 2 weeks for MD follow up and week 12 taxol.  Continue to monitor LFTs closely.    High risk medication requiring close monitoring for toxicity.

## 2024-11-07 ENCOUNTER — OFFICE VISIT (OUTPATIENT)
Dept: ONCOLOGY | Facility: CLINIC | Age: 59
End: 2024-11-07
Payer: COMMERCIAL

## 2024-11-07 ENCOUNTER — INFUSION (OUTPATIENT)
Dept: ONCOLOGY | Facility: HOSPITAL | Age: 59
End: 2024-11-07
Payer: COMMERCIAL

## 2024-11-07 VITALS
HEART RATE: 105 BPM | WEIGHT: 251.9 LBS | DIASTOLIC BLOOD PRESSURE: 80 MMHG | TEMPERATURE: 98.6 F | RESPIRATION RATE: 16 BRPM | OXYGEN SATURATION: 96 % | HEIGHT: 64 IN | BODY MASS INDEX: 43.01 KG/M2 | SYSTOLIC BLOOD PRESSURE: 115 MMHG

## 2024-11-07 DIAGNOSIS — Z17.0 MALIGNANT NEOPLASM OF UPPER-OUTER QUADRANT OF LEFT BREAST IN FEMALE, ESTROGEN RECEPTOR POSITIVE: Primary | ICD-10-CM

## 2024-11-07 DIAGNOSIS — C50.412 MALIGNANT NEOPLASM OF UPPER-OUTER QUADRANT OF LEFT BREAST IN FEMALE, ESTROGEN RECEPTOR POSITIVE: Primary | ICD-10-CM

## 2024-11-07 DIAGNOSIS — C50.412 MALIGNANT NEOPLASM OF UPPER-OUTER QUADRANT OF LEFT BREAST IN FEMALE, ESTROGEN RECEPTOR POSITIVE: ICD-10-CM

## 2024-11-07 DIAGNOSIS — Z17.0 MALIGNANT NEOPLASM OF UPPER-OUTER QUADRANT OF LEFT BREAST IN FEMALE, ESTROGEN RECEPTOR POSITIVE: ICD-10-CM

## 2024-11-07 DIAGNOSIS — Z45.2 ENCOUNTER FOR FITTING AND ADJUSTMENT OF VASCULAR CATHETER: ICD-10-CM

## 2024-11-07 DIAGNOSIS — Z79.899 HIGH RISK MEDICATION USE: ICD-10-CM

## 2024-11-07 LAB
ALBUMIN SERPL-MCNC: 3.9 G/DL (ref 3.5–5.2)
ALBUMIN/GLOB SERPL: 1.6 G/DL
ALP SERPL-CCNC: 85 U/L (ref 39–117)
ALT SERPL W P-5'-P-CCNC: 53 U/L (ref 1–33)
ANION GAP SERPL CALCULATED.3IONS-SCNC: 9.6 MMOL/L (ref 5–15)
AST SERPL-CCNC: 34 U/L (ref 1–32)
BASOPHILS # BLD AUTO: 0.06 10*3/MM3 (ref 0–0.2)
BASOPHILS NFR BLD AUTO: 0.9 % (ref 0–1.5)
BILIRUB SERPL-MCNC: 0.4 MG/DL (ref 0–1.2)
BUN SERPL-MCNC: 15 MG/DL (ref 6–20)
BUN/CREAT SERPL: 21.7 (ref 7–25)
CALCIUM SPEC-SCNC: 9.1 MG/DL (ref 8.6–10.5)
CHLORIDE SERPL-SCNC: 105 MMOL/L (ref 98–107)
CO2 SERPL-SCNC: 25.4 MMOL/L (ref 22–29)
CREAT SERPL-MCNC: 0.69 MG/DL (ref 0.57–1)
DEPRECATED RDW RBC AUTO: 53.1 FL (ref 37–54)
EGFRCR SERPLBLD CKD-EPI 2021: 101.4 ML/MIN/1.73
EOSINOPHIL # BLD AUTO: 0.16 10*3/MM3 (ref 0–0.4)
EOSINOPHIL NFR BLD AUTO: 2.3 % (ref 0.3–6.2)
ERYTHROCYTE [DISTWIDTH] IN BLOOD BY AUTOMATED COUNT: 14.3 % (ref 12.3–15.4)
GLOBULIN UR ELPH-MCNC: 2.4 GM/DL
GLUCOSE SERPL-MCNC: 150 MG/DL (ref 65–99)
HCT VFR BLD AUTO: 32.9 % (ref 34–46.6)
HGB BLD-MCNC: 11.2 G/DL (ref 12–15.9)
IMM GRANULOCYTES # BLD AUTO: 0.25 10*3/MM3 (ref 0–0.05)
IMM GRANULOCYTES NFR BLD AUTO: 3.6 % (ref 0–0.5)
LYMPHOCYTES # BLD AUTO: 1.43 10*3/MM3 (ref 0.7–3.1)
LYMPHOCYTES NFR BLD AUTO: 20.8 % (ref 19.6–45.3)
MCH RBC QN AUTO: 34.6 PG (ref 26.6–33)
MCHC RBC AUTO-ENTMCNC: 34 G/DL (ref 31.5–35.7)
MCV RBC AUTO: 101.5 FL (ref 79–97)
MONOCYTES # BLD AUTO: 0.5 10*3/MM3 (ref 0.1–0.9)
MONOCYTES NFR BLD AUTO: 7.3 % (ref 5–12)
NEUTROPHILS NFR BLD AUTO: 4.48 10*3/MM3 (ref 1.7–7)
NEUTROPHILS NFR BLD AUTO: 65.1 % (ref 42.7–76)
NRBC BLD AUTO-RTO: 0.3 /100 WBC (ref 0–0.2)
PLATELET # BLD AUTO: 332 10*3/MM3 (ref 140–450)
PMV BLD AUTO: 8.5 FL (ref 6–12)
POTASSIUM SERPL-SCNC: 4.1 MMOL/L (ref 3.5–5.2)
PROT SERPL-MCNC: 6.3 G/DL (ref 6–8.5)
RBC # BLD AUTO: 3.24 10*6/MM3 (ref 3.77–5.28)
SODIUM SERPL-SCNC: 140 MMOL/L (ref 136–145)
WBC NRBC COR # BLD AUTO: 6.88 10*3/MM3 (ref 3.4–10.8)

## 2024-11-07 PROCEDURE — 96375 TX/PRO/DX INJ NEW DRUG ADDON: CPT

## 2024-11-07 PROCEDURE — 25810000003 SODIUM CHLORIDE 0.9 % SOLUTION: Performed by: NURSE PRACTITIONER

## 2024-11-07 PROCEDURE — 96413 CHEMO IV INFUSION 1 HR: CPT

## 2024-11-07 PROCEDURE — 85025 COMPLETE CBC W/AUTO DIFF WBC: CPT | Performed by: INTERNAL MEDICINE

## 2024-11-07 PROCEDURE — 25810000003 SODIUM CHLORIDE 0.9 % SOLUTION 250 ML FLEX CONT: Performed by: NURSE PRACTITIONER

## 2024-11-07 PROCEDURE — 25010000002 DIPHENHYDRAMINE PER 50 MG: Performed by: NURSE PRACTITIONER

## 2024-11-07 PROCEDURE — 25010000002 ONDANSETRON PER 1 MG: Performed by: NURSE PRACTITIONER

## 2024-11-07 PROCEDURE — 25010000002 DEXAMETHASONE SODIUM PHOSPHATE 100 MG/10ML SOLUTION: Performed by: NURSE PRACTITIONER

## 2024-11-07 PROCEDURE — 25010000002 PACLITAXEL PER 1 MG: Performed by: NURSE PRACTITIONER

## 2024-11-07 PROCEDURE — 25010000002 HEPARIN LOCK FLUSH PER 10 UNITS: Performed by: INTERNAL MEDICINE

## 2024-11-07 PROCEDURE — 80053 COMPREHEN METABOLIC PANEL: CPT | Performed by: INTERNAL MEDICINE

## 2024-11-07 RX ORDER — HEPARIN SODIUM (PORCINE) LOCK FLUSH IV SOLN 100 UNIT/ML 100 UNIT/ML
500 SOLUTION INTRAVENOUS AS NEEDED
Status: DISCONTINUED | OUTPATIENT
Start: 2024-11-07 | End: 2024-11-07 | Stop reason: HOSPADM

## 2024-11-07 RX ORDER — SODIUM CHLORIDE 9 MG/ML
20 INJECTION, SOLUTION INTRAVENOUS ONCE
Status: COMPLETED | OUTPATIENT
Start: 2024-11-07 | End: 2024-11-07

## 2024-11-07 RX ORDER — HEPARIN SODIUM (PORCINE) LOCK FLUSH IV SOLN 100 UNIT/ML 100 UNIT/ML
500 SOLUTION INTRAVENOUS AS NEEDED
OUTPATIENT
Start: 2024-11-07

## 2024-11-07 RX ORDER — DIPHENHYDRAMINE HYDROCHLORIDE 50 MG/ML
50 INJECTION INTRAMUSCULAR; INTRAVENOUS AS NEEDED
Status: CANCELLED | OUTPATIENT
Start: 2024-11-07

## 2024-11-07 RX ORDER — FAMOTIDINE 10 MG/ML
20 INJECTION, SOLUTION INTRAVENOUS ONCE
Status: COMPLETED | OUTPATIENT
Start: 2024-11-07 | End: 2024-11-07

## 2024-11-07 RX ORDER — SODIUM CHLORIDE 0.9 % (FLUSH) 0.9 %
10 SYRINGE (ML) INJECTION AS NEEDED
OUTPATIENT
Start: 2024-11-07

## 2024-11-07 RX ORDER — SODIUM CHLORIDE 0.9 % (FLUSH) 0.9 %
10 SYRINGE (ML) INJECTION AS NEEDED
Status: DISCONTINUED | OUTPATIENT
Start: 2024-11-07 | End: 2024-11-07 | Stop reason: HOSPADM

## 2024-11-07 RX ORDER — FAMOTIDINE 10 MG/ML
20 INJECTION, SOLUTION INTRAVENOUS ONCE
Status: CANCELLED | OUTPATIENT
Start: 2024-11-07

## 2024-11-07 RX ORDER — FAMOTIDINE 10 MG/ML
20 INJECTION, SOLUTION INTRAVENOUS AS NEEDED
Status: CANCELLED | OUTPATIENT
Start: 2024-11-07

## 2024-11-07 RX ORDER — DIPHENHYDRAMINE HYDROCHLORIDE 50 MG/ML
25 INJECTION INTRAMUSCULAR; INTRAVENOUS ONCE
Status: COMPLETED | OUTPATIENT
Start: 2024-11-07 | End: 2024-11-07

## 2024-11-07 RX ORDER — SODIUM CHLORIDE 9 MG/ML
20 INJECTION, SOLUTION INTRAVENOUS ONCE
Status: CANCELLED | OUTPATIENT
Start: 2024-11-07

## 2024-11-07 RX ADMIN — Medication 500 UNITS: at 12:04

## 2024-11-07 RX ADMIN — Medication 10 ML: at 12:04

## 2024-11-07 RX ADMIN — SODIUM CHLORIDE 20 ML/HR: 9 INJECTION, SOLUTION INTRAVENOUS at 10:05

## 2024-11-07 RX ADMIN — DIPHENHYDRAMINE HYDROCHLORIDE 25 MG: 50 INJECTION INTRAMUSCULAR; INTRAVENOUS at 10:06

## 2024-11-07 RX ADMIN — ONDANSETRON 8 MG: 2 INJECTION INTRAMUSCULAR; INTRAVENOUS at 10:16

## 2024-11-07 RX ADMIN — PACLITAXEL 170 MG: 6 INJECTION, SOLUTION INTRAVENOUS at 11:01

## 2024-11-07 RX ADMIN — DEXAMETHASONE SODIUM PHOSPHATE 12 MG: 10 INJECTION, SOLUTION INTRAMUSCULAR; INTRAVENOUS at 10:33

## 2024-11-07 RX ADMIN — FAMOTIDINE 20 MG: 10 INJECTION INTRAVENOUS at 10:05

## 2024-11-08 ENCOUNTER — OFFICE VISIT (OUTPATIENT)
Dept: SURGERY | Facility: CLINIC | Age: 59
End: 2024-11-08
Payer: COMMERCIAL

## 2024-11-08 ENCOUNTER — HOSPITAL ENCOUNTER (OUTPATIENT)
Dept: SURGERY | Facility: HOSPITAL | Age: 59
Discharge: HOME | End: 2024-11-08
Payer: COMMERCIAL

## 2024-11-08 ENCOUNTER — TRANSCRIBE ORDERS (OUTPATIENT)
Dept: SURGERY | Facility: CLINIC | Age: 59
End: 2024-11-08

## 2024-11-08 VITALS
HEART RATE: 103 BPM | BODY MASS INDEX: 41.32 KG/M2 | WEIGHT: 242 LBS | OXYGEN SATURATION: 97 % | DIASTOLIC BLOOD PRESSURE: 80 MMHG | SYSTOLIC BLOOD PRESSURE: 148 MMHG | HEIGHT: 64 IN

## 2024-11-08 DIAGNOSIS — C50.412 MALIGNANT NEOPLASM OF UPPER-OUTER QUADRANT OF LEFT BREAST IN FEMALE, ESTROGEN RECEPTOR POSITIVE: Primary | ICD-10-CM

## 2024-11-08 DIAGNOSIS — Z17.0 MALIGNANT NEOPLASM OF UPPER-OUTER QUADRANT OF LEFT BREAST IN FEMALE, ESTROGEN RECEPTOR POSITIVE: Primary | ICD-10-CM

## 2024-11-08 DIAGNOSIS — Z17.0 MALIGNANT NEOPLASM OF UPPER-OUTER QUADRANT OF LEFT BREAST IN FEMALE, ESTROGEN RECEPTOR POSITIVE: ICD-10-CM

## 2024-11-08 DIAGNOSIS — C50.412 MALIGNANT NEOPLASM OF UPPER-OUTER QUADRANT OF LEFT BREAST IN FEMALE, ESTROGEN RECEPTOR POSITIVE: ICD-10-CM

## 2024-11-08 PROCEDURE — 99215 OFFICE O/P EST HI 40 MIN: CPT | Performed by: STUDENT IN AN ORGANIZED HEALTH CARE EDUCATION/TRAINING PROGRAM

## 2024-11-08 PROCEDURE — 76642 ULTRASOUND BREAST LIMITED: CPT | Performed by: STUDENT IN AN ORGANIZED HEALTH CARE EDUCATION/TRAINING PROGRAM

## 2024-11-08 PROCEDURE — 99417 PROLNG OP E/M EACH 15 MIN: CPT | Performed by: STUDENT IN AN ORGANIZED HEALTH CARE EDUCATION/TRAINING PROGRAM

## 2024-11-08 NOTE — LETTER
November 11, 2024     Moira Tejada MD  [address]  [address]  [place] [id number]    Patient: Sd Man   YOB: 1965   Date of Visit: 11/8/2024     Dear Moira Tejada MD:       Thank you for referring Sd Man to me for evaluation. Below are the relevant portions of my assessment and plan of care.    If you have questions, please do not hesitate to call me. I look forward to following Sd along with you.         Sincerely,        Moira Mccain MD        CC: MD Adán Fong Sarah, MD  11/11/24 0804  Sign when Signing Visit  [facility]     Referring Provider: Nely Jesus     Chief complaint: breast mass and newly diagnosed breast cancer    Subjective   HPI: Ms. Sd Man is a 59 y.o. yo woman, seen at the request of Nely Jesus (OBGYN) for a new diagnosis of left breast cancer.      She noted an area of pain in her left breast that started several months ago.  She noted a palpable mass approximately 6 weeks ago.  She thought it might be due to increased size of the lymph node after a period of a scratchy throat.  She then presented to her OB/GYN who ordered diagnostic imaging and further workup.  She has not had recent imaging of her breast for screening.    She denies any prior history of abnormal mammograms or breast biopsies.     She reports she has a pertinent PMH of PCOS.  She has had significant stressors due to job changes and family health concerns including stressful care of her children over the past several years.  She underwent a screening colonoscopy and was found to have a suspicious polyp and close follow-up was recommended.  She has not been able to return for follow-up colonoscopy and is concerned there could be an underlying colon malignancy.  She also went through a complicated divorce before [facility].    She was joined today in clinic by her friend. They all participated in the discussion, after her examination, and she gave her  consent for them to participate.    HPI 5/15/24  She completed breast MRI.  She notes the lesion in her left lateral breast feels subjectively larger.  The area continues to be painful and heavy feeling.  Denies any new symptoms.    HPI 5/29/24  Completed staging scans. No concerns for metastatic disease. US recommended for liver cyst.   She remains anxious about all possible therapies - side effects from radiation and chemotherapy. She is interested in discussion with behavioral oncology and medical oncology to discuss expected side effects and course of therapy.     HPI 11/8/24  Prior to start of chemotherapy contracted COVID and then required dental work which delayed her start of neoadjuvant therapy.  During this time she noted ongoing increase in the size of her primary breast tumor.  This was incredibly concerning.  After her initial doses of chemotherapy noted immediate treatment response with initial inflammation then marked shrinkage in the size of the mass in her breast and axilla.  She is now nearing the end of ACT neoadjuvant therapy, initiated chemo in [place] near family, has transitioned back to [place].  Overall tolerating chemo well has complications from chemo including alopecia, GI changes, skin changes and nail changes.  Is getting ready to complete a rock hunting trip this weekend.  Is looking forward to surgery.      Oncology/Hematology History   Malignant neoplasm of upper-outer quadrant of left breast in female, estrogen receptor positive   4/16/2024 Cancer Staged    Staging form: Breast, AJCC 8th Edition  - Clinical stage from 4/16/2024: Stage IIB (cT2, cN1(f), cM0, G3, ER+, IA+, HER2-) - Signed by Moira Mccain MD on 5/15/2024     4/30/2024 Initial Diagnosis    Malignant neoplasm of upper-outer quadrant of left breast in female, estrogen receptor positive     9/3/2024 -  Chemotherapy    OP BREAST PACLitaxel Adjuvant (Weekly X 12)         Review of Systems   Constitutional:  Negative for  appetite change, fatigue and fever.   Respiratory:  Negative for cough and shortness of breath.    Gastrointestinal:  Negative for abdominal distention, diarrhea and nausea.   Musculoskeletal:  Negative for arthralgias and back pain.   Neurological:  Negative for dizziness, headaches and speech difficulty.   Psychiatric/Behavioral:  Positive for depression. Negative for decreased concentration and sleep disturbance. The patient is nervous/anxious.        Medications:    Current Outpatient Medications:   •  amphetamine-dextroamphetamine (ADDERALL) 12.5 MG tablet, Take 1 tablet by mouth Every Night. 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 PT TO HOLD 48 HOURS PRIOR TO SURGERY, Disp: , Rfl:   •  Cholecalciferol (D3 VITAMIN PO), Take  by mouth., Disp: , Rfl:   •  lidocaine-prilocaine (EMLA) 2.5-2.5 % cream, APPLY TOPICALLY AS NEEDED FOR MILD PAIN. APPLY TO PORT SITE AS NEEDED FOR PAIN/DISCOMFORT, Disp: , Rfl:   •  OLANZapine (zyPREXA) 5 MG tablet, Take 1 tablet by mouth Daily., Disp: 30 tablet, Rfl: 0  •  ondansetron (ZOFRAN) 8 MG tablet, Take 1 tablet by mouth Every 8 (Eight) Hours As Needed for Nausea or Vomiting., Disp: 30 tablet, Rfl: 0  •  Vortioxetine HBr (TRINTELLIX) 20 MG tablet, Take 1 tablet by mouth Daily., Disp: , Rfl:     Allergies:  No Known Allergies    Medical history:  Past Medical History:   Diagnosis Date   • ADHD    • Anxiety    • Breast cancer    • Depression    • Hypoxemia associated with sleep    • Insulin resistance    • Metabolic syndrome X    • Obesity 11/11/2012   • CHELO on CPAP 06/17/2024    Home sleep study.  Weight 242 pounds.  Moderate CHELO with AHI 19.4 events per hour.  Low oxygen saturation 66% and sleep-related hypoxia abnormal for 43.4 minutes.   • PCOS (polycystic ovarian syndrome)    • PTSD (post-traumatic stress disorder)        Surgical History:  Past Surgical History:   Procedure  Laterality Date   • OOPHORECTOMY Bilateral 2014   • VENOUS ACCESS DEVICE (PORT) INSERTION Right 2024    Procedure: INSERTION VENOUS ACCESS DEVICE;  Surgeon: Moira Mccain MD;  Location: [facility] OR;  Service: General;  Laterality: Right;       Family History:  Family History   Problem Relation Age of Onset   • Melanoma Mother 75        alive currently 79   • Atrial fibrillation Father    • Hypertension Father    • Malig Hyperthermia Neg Hx        Family Cancer History: relationship - (age of diagnosis/current age or age at death)  Breast: Denies  Ovarian: Denies  Colon: Denies  Pancreas: Denies  Prostate: Denies    Social History:   Social History     Socioeconomic History   • Marital status:    Tobacco Use   • Smoking status: Never     Passive exposure: Never   • Smokeless tobacco: Never   Vaping Use   • Vaping status: Never Used   Substance and Sexual Activity   • Alcohol use: Never   • Drug use: Never   • Sexual activity: Not Currently       She lives by herself  She works as a , at [employer], has returned to working 3 days a week         GYNECOLOGIC HISTORY:   G: 3. P: 3. AB: 0.  Last menstrual period: 47  Age at menarche: 14  Age at first childbirth: 32  Lactation/How lon years  Age at menopause: 47  Total years of oral contraceptive use: None  Total years of hormone replacement therapy: Several years      Objective  PHYSICAL EXAMINATION:   Vitals:    24 0834   BP: 148/80   Pulse: 103   SpO2: 97%           Examination chaperoned by Halina.  ECOG 0 - Asymptomatic  General: NAD, well appearing  Psych: a&o x 3, anxious, tangential conversation  Eyes: EOMI, no scleral icterus  ENMT: neck supple without masses or thyromegaly, mucus membranes moist  Resp: normal effort  CV: RRR, no edema   GI: soft, NT, ND  MSK: normal gait, normal ROM in bilateral shoulders  Lymph nodes:  no cervical, supraclavicular lymphadenopathy, left axilla with palpable mobile axillary  lymphadenopathy  Breast: symmetric, bra 42C, broad base of the breast  Right:  No visible abnormalities on inspection while seated, with arms raised or hands on hips. No masses, skin changes, or nipple abnormalities.  Left:  No visible abnormalities on inspection while seated. No skin changes, or nipple abnormalities.  No palpable masses in breast or axilla on exam today    Previous IMAGING: I have independently reviewed her imagin2024 bilateral diagnostic mammogram  The breasts are heterogeneously dense which may obscure small masses.  Finding 1: There is a high density irregular mass measuring 20 mm with indistinct margins seen in the posterior one third region left breast 2:00 11 cm from nipple.  Patient indicates a palpable lump or thickening in the left breast.  The symptomatic area is clearly marked.  In the right breast no suspicious masses significant calcifications or other abnormalities are seen.  Left breast ultrasound  Finding 1: Ultrasound demonstrates an irregular solid mass with indistinct margins measuring 20 x 17 x 20 mm seen in the posterior one third region of the left breast 2:00 11 cm from the nipple.  This correlates to the palpable area.  Finding 2: There is a lymph node measuring 20 x 9 x 13 mm seen in the left breast the cortex is prominent measuring up to 4 mm in thickness  Impression:  Finding 1: Solid mass in the left breast is suspicious.  Ultrasound-guided vacuum-assisted biopsy is recommended.  Finding 2: Lymph node in the left breast is suspicious FNA possible spring-loaded or limited core biopsy is recommended.  BI-RADS 4C    2024 left ultrasound-guided biopsy  Patient's left breast in the axillary position was imaged and the abnormal lymph node was localized.  Using a 12-gauge biopsy device 5 cores were obtained.  A HydroMARK tissue marker was placed at the biopsy site.  2 view postprocedure mammogram and ultrasound confirmed the clip in the expected  location.  Pathology returned as malignant and concordant.  Report describes a free-floating fragment of carcinoma, suspicious for metastasis.      5/10/24 Breast MRI  FINDINGS: Scattered fibroglandular tissue is seen throughout both  breast. Mild background parenchymal enhancement of both breasts is  noted.     In the posterior one third lateral aspect of the left breast at the 3  o'clock position on the order of 12 cm from the nipple. There is an  irregular enhancing mass that measures 2.6 cm in anterior to posterior  dimension, 2.4 cm in the superior to inferior dimension and 2.7 cm in  the mediolateral dimension. A focal signal void is seen centrally within  the mass. This represents the biopsy-proven site of malignancy with the  internal mini cork shaped metallic clip.     No other areas of suspicious enhancement or morphology are seen in the  left breast. I see no evidence for abnormal skin, nipple or chest wall  enhancement of the left breast. There is an inferior left axillary lymph  node that measures on the order of 2 cm in greatest dimension and shows  diffuse cortical thickening with the cortex measuring up to 0.8 cm in  thickness. Mild surrounding edema is noted. This is in the expected  location of the biopsied lymph node with the associated coil-shaped  HydroMARK metallic clip at the inferior margin seen on the mammogram  dated 04/16/2024. No evidence for left internal mammary chain adenopathy  is appreciated.     There are no areas of suspicious enhancement or morphology in the right  breast. I see no evidence for abnormal skin, nipple or chest wall  enhancement of the right breast and there is no evidence for right  axillary or internal mammary chain adenopathy.        IMPRESSION:  1. Biopsy-proven malignancy in the left breast in the posterior one  third at the 3 o'clock position that measures on the order of 2.7 cm in  greatest dimension. No other suspicious findings are seen within the  left  breast. A left axillary lymph node that has undergone prior biopsy  and shows diffuse cortical thickening is noted and is suspicious for  left axillary adenopathy are noted.  2. There are no findings suspicious for malignancy in the right breast.    5/23/24 CT Chest abdomen/pelvis  CLINICAL INFORMATION: Newly diagnosed breast carcinoma, staging.     FINDINGS:  1. Cardiac size within normal limits, no pericardial or esophageal  abnormality. Diameter of the aorta is within normal limits, no  mediastinal or hilar mass/lymphadenopathy. The lungs are clear. No  pleural effusion or acute airspace disease.     2. Typical-appearing bone island is demonstrated within the right iliac  wing, subchondral cyst is demonstrated within the left acetabulum. No  suspicious lytic or sclerotic bone lesion.     3. 2.4 cm rounded density within the lateral aspect of the left breast  with a surgical marker centrally. Prominent 2.4 cm left axillary lymph  node, this has been previously biopsied.     4. 12 mm subcapsular cyst left lobe of the liver seen best on image 74.  Attenuation compatible with serous fluid. Second likely cyst within the  right lobe of the liver, measuring 15 mm, seen best on image #105.  Attenuation compatible with slightly complex fluid. No grossly  suspicious liver lesion identified, the adrenal glands have a  satisfactory appearance.     5. The gallbladder, pancreas, spleen and kidneys are normal. Diameter of  the aorta is within normal limits. The bladder is collapsed, contour  within normal limits. The uterus is satisfactory in size and shape, the  ovaries have been removed. There is diverticulosis of the colon without  diverticulitis. The small bowel is satisfactory in appearance. The  stomach and duodenum are unremarkable. No abdominal or pelvic  lymphadenopathy. No free intraperitoneal fluid seen.     CONCLUSION: There is a nodular density within the lateral aspect of the  left breast at the site of biopsy,  enlarged left axillary lymph node  seen, this previously biopsied. No convincing evidence of metastatic  disease involving the chest, abdomen or pelvis; no suspicious bone  lesion. There appear to be 2 small hepatic cysts, 1 of these likely  complex (I would recommend liver ultrasound for confirmation). Fairly  typical-appearing bone island within the right iliac wing.    5/23/24 Bone Scan  INDICATION: Breast cancer   COMPARISON: CT chest abdomen pelvis 5/23/2024  FINDINGS: No suspicious focus of increased or decreased MDP activity.  Physiologic activity remains within the kidneys and urinary bladder with  urinary contamination.     IMPRESSION:  No scintigraphic evidence of osseous metastatic disease.      11/8/24 Left Ultrasound Breast, Targeted  Procedure: Diagnostic ultrasound, Left breast  Indication: followup near end of chemotherapy  Comparisons: 4/9/24 Diagnostic mammogram and US, 5/10/24 Breast MRI  Description of procedure: Using a 10MHz linear transducer, I scanned the breast at the area of interest.  Findings:  At 3:00, 11 cm FN, there is a hypoechoic, irregular, ill-defined, homogeneous mass with posterior shadowing, measuring 0.9 x 0.9 cm in radial dimension and 1.1 x 1.2 cm in antiradial dimension. The clip is noted to be in the superior aspect of the lesion.     At 2:00, 13 cm from the nipple there is a visualized HydroMARK biopsy clip.  It is not within any discernible structure but is adjacent to 2 lymph nodes.  The cortex is visible in both, no suspicious thickening identified.    Impression:   Previously biopsy proven breast cancer and lymph node with metastatic disease. Both locations show significant decrease in size and lymph nodes have returned to near normal morphology.  BI-RADS 6.    Plan:    Continue with oncologic plan of care.         PATHOLOGY:   4/16/24  1.  Left breast, 2:00, ultrasound-biopsies for mass: INVASIVE MAMMARY CARCINOMA, NO SPECIAL TYPE (INVASIVE DUCTAL CARCINOMA).                -Histologic grade: [place] is logic score III (tubules = 3, nuclei = 3, mitosis = 2).               -No in situ component identified.               -Invasive carcinoma involves multiple tissue cores, measuring up to 14 mm maximally.               -No lymphovascular nor perineural invasion identified.     2. Lymph node, left axilla, core biopsies:               -Free floating fragment of carcinoma, suspicious for metastasis (see Comment).    Estrogen Receptor (ER) Status  Positive (greater than 10% of cells demonstrate nuclear positivity)   Percentage of Cells with Nuclear Positivity  %   Average Intensity of Staining  Strong   Test Type  Food and Drug Administration (FDA) cleared (test / vendor): ventana   Primary Antibody  SP1   Scoring System  Kathy   Proportion Score  5   Intensity Score  3   Total Kathy Score  8   Test(s) Performed     Progesterone Receptor (PgR) Status  Positive   Percentage of Cells with Nuclear Positivity  31-40%   Average Intensity of Staining  Moderate   Test Type  Food and Drug Administration (FDA) cleared (test / vendor): ventana   Primary Antibody  1E2   Scoring System  Kathy   Proportion Score  4   Intensity Score  2   Total Kathy Score  6   Test(s) Performed     HER2 by Immunohistochemistry  Negative (Score 1+)   Test Type  Food and Drug Administration (FDA) cleared (test / vendor): ventana   Primary Antibody  4B5   Test(s) Performed  Ki-67   Ki-67 Percentage of Positive Nuclei  95 %         Assessment & Plan  Assessment:  59 y.o. F with a new diagnosis of left breast: Invasive ductal carcinoma grade III, ER/ND +, Her2 -; T2N1M0, anatomic stage IIB, prognostic stage IIB.      Discussion:  I had an extensive discussion with the patient and her family about the nature of her breast cancer diagnosis. We reviewed the components of breast tissue including ducts and lobules. We reviewed her pathology report in detail. We reviewed breast cancer histology, including stage,  grade, ER/MN receptors, HER2 receptors and how this applies to her diagnosis. We reviewed the basics of systemic and local/regional management of breast cancer.     We reviewed potential surgical treatments to include partial mastectomy, mastectomy, sentinel lymph node biopsy and axillary node dissection and discussed the rationale associated with each approach. Regarding radiation therapy, we discussed that radiation is indicated in all cases of breast conservation and in only limited circumstances following mastectomy. Partial mastectomy with radiation and mastectomy were explained with option of reconstruction.  The patient was informed that from a survival standpoint, both procedures are oncologically equivalent.   We discussed that the primary goal of adjuvant radiation is to decrease the likelihood of local recurrence.     I described additional risks and potential complications associated with surgery, including, but not limited to, bleeding, infection, deformity/poor cosmetic result, chronic pain, numbness, seroma, hematoma, deep venous thrombosis, skin flap necrosis, disease recurrence and the possibility of requiring additional surgery. We also discussed other treatment options including the option of not undergoing any surgical treatment with a palliative rather than curative intent and the risks associated with this including disease progression. She expressed an understanding of these factors and wished to proceed.      Plan:  A multidisciplinary plan has been formulated for the patient:      # Breast Surgical Oncology:  - nearing the end of neoadjuvant chemotherapy, diagnostic US today shows significant decrease in size of radha and primary tumor size.   -Consents completed and signed. Discussed the risks and benefits of left partial mastectomy, wire localized and targeted axillary dissection  sentinel lymph node biopsy, possible dissection at length including estimated time for procedure,  postoperative recovery, and postoperative instructions. Discussed the risks to include pain, bleeding, infection, nerve injury, numbness, seroma, skin complications including necrosis, breast asymmetry, need for re-excision, lymphedema, possible need for axillary dissection at time of surgery or at a later date, lymphocele, and scar.  Patient appears to understand and wishes to proceed.  All questions were answered.      # Medical Oncology:  - port in place working well  - completing ACT now    #  Radiation Oncology:  -Will refer postoperatively.    # Nurse Navigation  - following.    # Lymphedema clinic  - Referral was placed previously    # Breast Imaging  - Next Screening mammogram due: 4/2025  -Staging scans without signs of metastatic disease    #Genetic Testing  - completed at OSH, reportedly negative    #General Medical work up  - has appointment with PCP in November  - needs screening colonoscopy       Moira Mccain MD  Breast Surgical Oncology    I spent 80 minutes caring for Ms Man on this date of service. This time includes time spent by me in the following activities: preparing for the visit, reviewing tests, obtaining and/or reviewing a separately obtained history, performing a medically appropriate examination and/or evaluation , counseling and educating the patient/family/caregiver, referring and communicating with other health care professionals , documenting information in the medical record, independently interpreting results and communicating that information with the patient/family/caregiver, and care coordination.  Additional 5 minutes outside of the documented time was required to complete the listed procedure: US.     CC:  Nely Jesus-OB/GYN  Moira Tejada MD

## 2024-11-08 NOTE — PROGRESS NOTES
[facility]     Referring Provider: Nely Jesus     Chief complaint: breast mass and newly diagnosed breast cancer    Subjective    HPI: Ms. Sd Man is a 59 y.o. yo woman, seen at the request of Nely Jesus (OBGYN) for a new diagnosis of left breast cancer.      She noted an area of pain in her left breast that started several months ago.  She noted a palpable mass approximately 6 weeks ago.  She thought it might be due to increased size of the lymph node after a period of a scratchy throat.  She then presented to her OB/GYN who ordered diagnostic imaging and further workup.  She has not had recent imaging of her breast for screening.    She denies any prior history of abnormal mammograms or breast biopsies.     She reports she has a pertinent PMH of PCOS.  She has had significant stressors due to job changes and family health concerns including stressful care of her children over the past several years.  She underwent a screening colonoscopy and was found to have a suspicious polyp and close follow-up was recommended.  She has not been able to return for follow-up colonoscopy and is concerned there could be an underlying colon malignancy.  She also went through a complicated divorce before [facility].    She was joined today in clinic by her friend. They all participated in the discussion, after her examination, and she gave her consent for them to participate.    HPI 5/15/24  She completed breast MRI.  She notes the lesion in her left lateral breast feels subjectively larger.  The area continues to be painful and heavy feeling.  Denies any new symptoms.    HPI 5/29/24  Completed staging scans. No concerns for metastatic disease. US recommended for liver cyst.   She remains anxious about all possible therapies - side effects from radiation and chemotherapy. She is interested in discussion with behavioral oncology and medical oncology to discuss expected side effects and course of therapy.     HPI  11/8/24  Prior to start of chemotherapy contracted COVID and then required dental work which delayed her start of neoadjuvant therapy.  During this time she noted ongoing increase in the size of her primary breast tumor.  This was incredibly concerning.  After her initial doses of chemotherapy noted immediate treatment response with initial inflammation then marked shrinkage in the size of the mass in her breast and axilla.  She is now nearing the end of ACT neoadjuvant therapy, initiated chemo in [place] near family, has transitioned back to [place].  Overall tolerating chemo well has complications from chemo including alopecia, GI changes, skin changes and nail changes.  Is getting ready to complete a rock hunting trip this weekend.  Is looking forward to surgery.      Oncology/Hematology History   Malignant neoplasm of upper-outer quadrant of left breast in female, estrogen receptor positive   4/16/2024 Cancer Staged    Staging form: Breast, AJCC 8th Edition  - Clinical stage from 4/16/2024: Stage IIB (cT2, cN1(f), cM0, G3, ER+, IA+, HER2-) - Signed by Moira Mccain MD on 5/15/2024     4/30/2024 Initial Diagnosis    Malignant neoplasm of upper-outer quadrant of left breast in female, estrogen receptor positive     9/3/2024 -  Chemotherapy    OP BREAST PACLitaxel Adjuvant (Weekly X 12)         Review of Systems   Constitutional:  Negative for appetite change, fatigue and fever.   Respiratory:  Negative for cough and shortness of breath.    Gastrointestinal:  Negative for abdominal distention, diarrhea and nausea.   Musculoskeletal:  Negative for arthralgias and back pain.   Neurological:  Negative for dizziness, headaches and speech difficulty.   Psychiatric/Behavioral:  Positive for depression. Negative for decreased concentration and sleep disturbance. The patient is nervous/anxious.        Medications:    Current Outpatient Medications:     amphetamine-dextroamphetamine (ADDERALL) 12.5 MG tablet, Take 1  tablet by mouth Every Night. 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 PT TO HOLD 48 HOURS PRIOR TO SURGERY, Disp: , Rfl:     Cholecalciferol (D3 VITAMIN PO), Take  by mouth., Disp: , Rfl:     lidocaine-prilocaine (EMLA) 2.5-2.5 % cream, APPLY TOPICALLY AS NEEDED FOR MILD PAIN. APPLY TO PORT SITE AS NEEDED FOR PAIN/DISCOMFORT, Disp: , Rfl:     OLANZapine (zyPREXA) 5 MG tablet, Take 1 tablet by mouth Daily., Disp: 30 tablet, Rfl: 0    ondansetron (ZOFRAN) 8 MG tablet, Take 1 tablet by mouth Every 8 (Eight) Hours As Needed for Nausea or Vomiting., Disp: 30 tablet, Rfl: 0    Vortioxetine HBr (TRINTELLIX) 20 MG tablet, Take 1 tablet by mouth Daily., Disp: , Rfl:     Allergies:  No Known Allergies    Medical history:  Past Medical History:   Diagnosis Date    ADHD     Anxiety     Breast cancer     Depression     Hypoxemia associated with sleep     Insulin resistance     Metabolic syndrome X     Obesity 11/11/2012    CHELO on CPAP 06/17/2024    Home sleep study.  Weight 242 pounds.  Moderate CHELO with AHI 19.4 events per hour.  Low oxygen saturation 66% and sleep-related hypoxia abnormal for 43.4 minutes.    PCOS (polycystic ovarian syndrome)     PTSD (post-traumatic stress disorder)        Surgical History:  Past Surgical History:   Procedure Laterality Date    OOPHORECTOMY Bilateral 01/01/2014    VENOUS ACCESS DEVICE (PORT) INSERTION Right 6/20/2024    Procedure: INSERTION VENOUS ACCESS DEVICE;  Surgeon: Moira Mccain MD;  Location: [facility];  Service: General;  Laterality: Right;       Family History:  Family History   Problem Relation Age of Onset    Melanoma Mother 75        alive currently 79    Atrial fibrillation Father     Hypertension Father     Malig Hyperthermia Neg Hx        Family Cancer History: relationship - (age of diagnosis/current age or age at death)  Breast: Denies  Ovarian: Denies  Colon:  Denies  Pancreas: Denies  Prostate: Denies    Social History:   Social History     Socioeconomic History    Marital status:    Tobacco Use    Smoking status: Never     Passive exposure: Never    Smokeless tobacco: Never   Vaping Use    Vaping status: Never Used   Substance and Sexual Activity    Alcohol use: Never    Drug use: Never    Sexual activity: Not Currently       She lives by herself  She works as a , at [employer], has returned to working 3 days a week         GYNECOLOGIC HISTORY:   G: 3. P: 3. AB: 0.  Last menstrual period: 47  Age at menarche: 14  Age at first childbirth: 32  Lactation/How lon years  Age at menopause: 47  Total years of oral contraceptive use: None  Total years of hormone replacement therapy: Several years      Objective   PHYSICAL EXAMINATION:   Vitals:    24 0834   BP: 148/80   Pulse: 103   SpO2: 97%           Examination chaperoned by Halina.  ECOG 0 - Asymptomatic  General: NAD, well appearing  Psych: a&o x 3, anxious, tangential conversation  Eyes: EOMI, no scleral icterus  ENMT: neck supple without masses or thyromegaly, mucus membranes moist  Resp: normal effort  CV: RRR, no edema   GI: soft, NT, ND  MSK: normal gait, normal ROM in bilateral shoulders  Lymph nodes:  no cervical, supraclavicular lymphadenopathy, left axilla with palpable mobile axillary lymphadenopathy  Breast: symmetric, bra 42C, broad base of the breast  Right:  No visible abnormalities on inspection while seated, with arms raised or hands on hips. No masses, skin changes, or nipple abnormalities.  Left:  No visible abnormalities on inspection while seated. No skin changes, or nipple abnormalities.  No palpable masses in breast or axilla on exam today    Previous IMAGING: I have independently reviewed her imagin2024 bilateral diagnostic mammogram  The breasts are heterogeneously dense which may obscure small masses.  Finding 1: There is a high density irregular mass measuring  20 mm with indistinct margins seen in the posterior one third region left breast 2:00 11 cm from nipple.  Patient indicates a palpable lump or thickening in the left breast.  The symptomatic area is clearly marked.  In the right breast no suspicious masses significant calcifications or other abnormalities are seen.  Left breast ultrasound  Finding 1: Ultrasound demonstrates an irregular solid mass with indistinct margins measuring 20 x 17 x 20 mm seen in the posterior one third region of the left breast 2:00 11 cm from the nipple.  This correlates to the palpable area.  Finding 2: There is a lymph node measuring 20 x 9 x 13 mm seen in the left breast the cortex is prominent measuring up to 4 mm in thickness  Impression:  Finding 1: Solid mass in the left breast is suspicious.  Ultrasound-guided vacuum-assisted biopsy is recommended.  Finding 2: Lymph node in the left breast is suspicious FNA possible spring-loaded or limited core biopsy is recommended.  BI-RADS 4C    4/16/2024 left ultrasound-guided biopsy  Patient's left breast in the axillary position was imaged and the abnormal lymph node was localized.  Using a 12-gauge biopsy device 5 cores were obtained.  A HydroMARK tissue marker was placed at the biopsy site.  2 view postprocedure mammogram and ultrasound confirmed the clip in the expected location.  Pathology returned as malignant and concordant.  Report describes a free-floating fragment of carcinoma, suspicious for metastasis.      5/10/24 Breast MRI  FINDINGS: Scattered fibroglandular tissue is seen throughout both  breast. Mild background parenchymal enhancement of both breasts is  noted.     In the posterior one third lateral aspect of the left breast at the 3  o'clock position on the order of 12 cm from the nipple. There is an  irregular enhancing mass that measures 2.6 cm in anterior to posterior  dimension, 2.4 cm in the superior to inferior dimension and 2.7 cm in  the mediolateral dimension. A  focal signal void is seen centrally within  the mass. This represents the biopsy-proven site of malignancy with the  internal mini cork shaped metallic clip.     No other areas of suspicious enhancement or morphology are seen in the  left breast. I see no evidence for abnormal skin, nipple or chest wall  enhancement of the left breast. There is an inferior left axillary lymph  node that measures on the order of 2 cm in greatest dimension and shows  diffuse cortical thickening with the cortex measuring up to 0.8 cm in  thickness. Mild surrounding edema is noted. This is in the expected  location of the biopsied lymph node with the associated coil-shaped  HydroMARK metallic clip at the inferior margin seen on the mammogram  dated 04/16/2024. No evidence for left internal mammary chain adenopathy  is appreciated.     There are no areas of suspicious enhancement or morphology in the right  breast. I see no evidence for abnormal skin, nipple or chest wall  enhancement of the right breast and there is no evidence for right  axillary or internal mammary chain adenopathy.        IMPRESSION:  1. Biopsy-proven malignancy in the left breast in the posterior one  third at the 3 o'clock position that measures on the order of 2.7 cm in  greatest dimension. No other suspicious findings are seen within the  left breast. A left axillary lymph node that has undergone prior biopsy  and shows diffuse cortical thickening is noted and is suspicious for  left axillary adenopathy are noted.  2. There are no findings suspicious for malignancy in the right breast.    5/23/24 CT Chest abdomen/pelvis  CLINICAL INFORMATION: Newly diagnosed breast carcinoma, staging.     FINDINGS:  1. Cardiac size within normal limits, no pericardial or esophageal  abnormality. Diameter of the aorta is within normal limits, no  mediastinal or hilar mass/lymphadenopathy. The lungs are clear. No  pleural effusion or acute airspace disease.     2. Typical-appearing  bone island is demonstrated within the right iliac  wing, subchondral cyst is demonstrated within the left acetabulum. No  suspicious lytic or sclerotic bone lesion.     3. 2.4 cm rounded density within the lateral aspect of the left breast  with a surgical marker centrally. Prominent 2.4 cm left axillary lymph  node, this has been previously biopsied.     4. 12 mm subcapsular cyst left lobe of the liver seen best on image 74.  Attenuation compatible with serous fluid. Second likely cyst within the  right lobe of the liver, measuring 15 mm, seen best on image #105.  Attenuation compatible with slightly complex fluid. No grossly  suspicious liver lesion identified, the adrenal glands have a  satisfactory appearance.     5. The gallbladder, pancreas, spleen and kidneys are normal. Diameter of  the aorta is within normal limits. The bladder is collapsed, contour  within normal limits. The uterus is satisfactory in size and shape, the  ovaries have been removed. There is diverticulosis of the colon without  diverticulitis. The small bowel is satisfactory in appearance. The  stomach and duodenum are unremarkable. No abdominal or pelvic  lymphadenopathy. No free intraperitoneal fluid seen.     CONCLUSION: There is a nodular density within the lateral aspect of the  left breast at the site of biopsy, enlarged left axillary lymph node  seen, this previously biopsied. No convincing evidence of metastatic  disease involving the chest, abdomen or pelvis; no suspicious bone  lesion. There appear to be 2 small hepatic cysts, 1 of these likely  complex (I would recommend liver ultrasound for confirmation). Fairly  typical-appearing bone island within the right iliac wing.    5/23/24 Bone Scan  INDICATION: Breast cancer   COMPARISON: CT chest abdomen pelvis 5/23/2024  FINDINGS: No suspicious focus of increased or decreased MDP activity.  Physiologic activity remains within the kidneys and urinary bladder with  urinary  contamination.     IMPRESSION:  No scintigraphic evidence of osseous metastatic disease.      11/8/24 Left Ultrasound Breast, Targeted  Procedure: Diagnostic ultrasound, Left breast  Indication: followup near end of chemotherapy  Comparisons: 4/9/24 Diagnostic mammogram and US, 5/10/24 Breast MRI  Description of procedure: Using a 10MHz linear transducer, I scanned the breast at the area of interest.  Findings:  At 3:00, 11 cm FN, there is a hypoechoic, irregular, ill-defined, homogeneous mass with posterior shadowing, measuring 0.9 x 0.9 cm in radial dimension and 1.1 x 1.2 cm in antiradial dimension. The clip is noted to be in the superior aspect of the lesion.     At 2:00, 13 cm from the nipple there is a visualized HydroMARK biopsy clip.  It is not within any discernible structure but is adjacent to 2 lymph nodes.  The cortex is visible in both, no suspicious thickening identified.    Impression:   Previously biopsy proven breast cancer and lymph node with metastatic disease. Both locations show significant decrease in size and lymph nodes have returned to near normal morphology.  BI-RADS 6.    Plan:    Continue with oncologic plan of care.         PATHOLOGY:   4/16/24  1.  Left breast, 2:00, ultrasound-biopsies for mass: INVASIVE MAMMARY CARCINOMA, NO SPECIAL TYPE (INVASIVE DUCTAL CARCINOMA).               -Histologic grade: Abhi is logic score III (tubules = 3, nuclei = 3, mitosis = 2).               -No in situ component identified.               -Invasive carcinoma involves multiple tissue cores, measuring up to 14 mm maximally.               -No lymphovascular nor perineural invasion identified.     2. Lymph node, left axilla, core biopsies:               -Free floating fragment of carcinoma, suspicious for metastasis (see Comment).    Estrogen Receptor (ER) Status  Positive (greater than 10% of cells demonstrate nuclear positivity)   Percentage of Cells with Nuclear Positivity  %   Average  Intensity of Staining  Strong   Test Type  Food and Drug Administration (FDA) cleared (test / vendor): ventana   Primary Antibody  SP1   Scoring System  Kathy   Proportion Score  5   Intensity Score  3   Total Kathy Score  8   Test(s) Performed     Progesterone Receptor (PgR) Status  Positive   Percentage of Cells with Nuclear Positivity  31-40%   Average Intensity of Staining  Moderate   Test Type  Food and Drug Administration (FDA) cleared (test / vendor): ventana   Primary Antibody  1E2   Scoring System  Kathy   Proportion Score  4   Intensity Score  2   Total Kathy Score  6   Test(s) Performed     HER2 by Immunohistochemistry  Negative (Score 1+)   Test Type  Food and Drug Administration (FDA) cleared (test / vendor): ventana   Primary Antibody  4B5   Test(s) Performed  Ki-67   Ki-67 Percentage of Positive Nuclei  95 %         Assessment & Plan   Assessment:  59 y.o. F with a new diagnosis of left breast: Invasive ductal carcinoma grade III, ER/PA +, Her2 -; T2N1M0, anatomic stage IIB, prognostic stage IIB.      Discussion:  I had an extensive discussion with the patient and her family about the nature of her breast cancer diagnosis. We reviewed the components of breast tissue including ducts and lobules. We reviewed her pathology report in detail. We reviewed breast cancer histology, including stage, grade, ER/PA receptors, HER2 receptors and how this applies to her diagnosis. We reviewed the basics of systemic and local/regional management of breast cancer.     We reviewed potential surgical treatments to include partial mastectomy, mastectomy, sentinel lymph node biopsy and axillary node dissection and discussed the rationale associated with each approach. Regarding radiation therapy, we discussed that radiation is indicated in all cases of breast conservation and in only limited circumstances following mastectomy. Partial mastectomy with radiation and mastectomy were explained with option of  reconstruction.  The patient was informed that from a survival standpoint, both procedures are oncologically equivalent.   We discussed that the primary goal of adjuvant radiation is to decrease the likelihood of local recurrence.     I described additional risks and potential complications associated with surgery, including, but not limited to, bleeding, infection, deformity/poor cosmetic result, chronic pain, numbness, seroma, hematoma, deep venous thrombosis, skin flap necrosis, disease recurrence and the possibility of requiring additional surgery. We also discussed other treatment options including the option of not undergoing any surgical treatment with a palliative rather than curative intent and the risks associated with this including disease progression. She expressed an understanding of these factors and wished to proceed.      Plan:  A multidisciplinary plan has been formulated for the patient:      # Breast Surgical Oncology:  - nearing the end of neoadjuvant chemotherapy, diagnostic US today shows significant decrease in size of radha and primary tumor size.   -Consents completed and signed. Discussed the risks and benefits of left partial mastectomy, wire localized and targeted axillary dissection  sentinel lymph node biopsy, possible dissection at length including estimated time for procedure, postoperative recovery, and postoperative instructions. Discussed the risks to include pain, bleeding, infection, nerve injury, numbness, seroma, skin complications including necrosis, breast asymmetry, need for re-excision, lymphedema, possible need for axillary dissection at time of surgery or at a later date, lymphocele, and scar.  Patient appears to understand and wishes to proceed.  All questions were answered.      # Medical Oncology:  - port in place working well  - completing ACT now    #  Radiation Oncology:  -Will refer postoperatively.    # Nurse Navigation  - following.    # Lymphedema clinic  -  Referral was placed previously    # Breast Imaging  - Next Screening mammogram due: 4/2025  -Staging scans without signs of metastatic disease    #Genetic Testing  - completed at OSH, reportedly negative    #General Medical work up  - has appointment with PCP in November  - needs screening colonoscopy       Moira Mccain MD  Breast Surgical Oncology    I spent 80 minutes caring for Ms Man on this date of service. This time includes time spent by me in the following activities: preparing for the visit, reviewing tests, obtaining and/or reviewing a separately obtained history, performing a medically appropriate examination and/or evaluation , counseling and educating the patient/family/caregiver, referring and communicating with other health care professionals , documenting information in the medical record, independently interpreting results and communicating that information with the patient/family/caregiver, and care coordination.  Additional 5 minutes outside of the documented time was required to complete the listed procedure: US.     CC:  Nely Jesus-OB/GYN  Moira Tejada MD

## 2024-11-11 ENCOUNTER — PREP FOR SURGERY (OUTPATIENT)
Dept: OTHER | Facility: HOSPITAL | Age: 59
End: 2024-11-11

## 2024-11-11 DIAGNOSIS — C50.412 MALIGNANT NEOPLASM OF UPPER-OUTER QUADRANT OF LEFT BREAST IN FEMALE, ESTROGEN RECEPTOR POSITIVE: Primary | ICD-10-CM

## 2024-11-11 DIAGNOSIS — Z17.0 MALIGNANT NEOPLASM OF UPPER-OUTER QUADRANT OF LEFT BREAST IN FEMALE, ESTROGEN RECEPTOR POSITIVE: Primary | ICD-10-CM

## 2024-11-11 RX ORDER — DIAZEPAM 5 MG/1
10 TABLET ORAL ONCE
Status: CANCELLED | OUTPATIENT
Start: 2024-12-19 | End: 2024-11-11

## 2024-11-12 NOTE — PROGRESS NOTES
Date of Office Visit: 2024  Encounter Provider: Nicolette Syed MD  Place of Service: [facility] CARDIOLOGY  Patient Name: Sd Man  :1965    Chief complaint  Cardio oncology care    History of Present Illness  Patient is a 59-year-old female with history of ADHD, depression, obesity, polycystic ovarian disease, dyslipidemia, elevated glucose, posttraumatic stress disorder diagnosed with left-sided breast cancer 2024.  She started AC therapy 2024 in [place].  Noted to have significant response.  She continues on Taxol therapy at unknown how much exposure to Adriamycin she had.  She was also diagnosed with sleep apnea and is on CPAP therapy for this.    Baseline echo 2024 with ejection fraction 65%, GLS -19.6% with grade 1 diastolic dysfunction trivial valve regurgitation.  There was moderate left ventricular hypertrophy.    Since last visit patient is walking doing gardening she has had no palpitations or dizziness.  She has had dyspnea on walking uphill which she attributes to Taxol but is more noticeable.  She had been doing otherwise relatively well but went to an overnight camping trip this weekend and got quite cold.  She slept with a heater and since then she has had a nonproductive dry hacking cough which she demonstrated an office visit.    Past Medical History:   Diagnosis Date    ADHD     Anxiety     Breast cancer     Depression     Hypoxemia associated with sleep     Insulin resistance     Metabolic syndrome X     Obesity 2012    CHELO on CPAP 2024    Home sleep study.  Weight 242 pounds.  Moderate CHELO with AHI 19.4 events per hour.  Low oxygen saturation 66% and sleep-related hypoxia abnormal for 43.4 minutes.    PCOS (polycystic ovarian syndrome)     PTSD (post-traumatic stress disorder)      Past Surgical History:   Procedure Laterality Date    OOPHORECTOMY Bilateral 2014    VENOUS ACCESS DEVICE (PORT) INSERTION Right 2024     Procedure: INSERTION VENOUS ACCESS DEVICE;  Surgeon: Moira Mccain MD;  Location: [facility] OR;  Service: General;  Laterality: Right;     Outpatient Medications Prior to Visit   Medication Sig Dispense Refill    amphetamine-dextroamphetamine (ADDERALL) 12.5 MG tablet Take 1 tablet by mouth Every Night. 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 Takes occasionally)      azSTARys 39.2-7.8 MG capsule Take 1 tablet by mouth Daily. PT TO HOLD 48 HOURS PRIOR TO SURGERY      Cholecalciferol (D3 VITAMIN PO) Take  by mouth.      lidocaine-prilocaine (EMLA) 2.5-2.5 % cream APPLY TOPICALLY AS NEEDED FOR MILD PAIN. APPLY TO PORT SITE AS NEEDED FOR PAIN/DISCOMFORT      ondansetron (ZOFRAN) 8 MG tablet Take 1 tablet by mouth Every 8 (Eight) Hours As Needed for Nausea or Vomiting. 30 tablet 0    Vortioxetine HBr (TRINTELLIX) 20 MG tablet Take 1 tablet by mouth Daily.      OLANZapine (zyPREXA) 5 MG tablet Take 1 tablet by mouth Daily. 30 tablet 0     No facility-administered medications prior to visit.       Allergies as of 11/13/2024    (No Known Allergies)     Social History     Socioeconomic History    Marital status:    Tobacco Use    Smoking status: Never     Passive exposure: Never    Smokeless tobacco: Never   Vaping Use    Vaping status: Never Used   Substance and Sexual Activity    Alcohol use: Never    Drug use: Never    Sexual activity: Not Currently     Family History   Problem Relation Age of Onset    Melanoma Mother 75        alive currently 79    Atrial fibrillation Father     Hypertension Father     Malig Hyperthermia Neg Hx      Review of Systems   Constitutional: Negative for chills, fever, weight gain and weight loss.   Cardiovascular:  Positive for chest pain. Negative for leg swelling.   Respiratory:  Positive for cough. Negative for snoring and wheezing.    Hematologic/Lymphatic: Negative for bleeding problem. Does  "not bruise/bleed easily.   Skin:  Negative for color change.   Musculoskeletal:  Negative for falls, joint pain and myalgias.   Gastrointestinal:  Negative for melena.   Genitourinary:  Negative for hematuria.   Neurological:  Negative for excessive daytime sleepiness.   Psychiatric/Behavioral:  Negative for depression. The patient is not nervous/anxious.         Objective:     Vitals:    11/13/24 0944   BP: 124/84   Pulse: 120   Resp: 16   SpO2: 97%   Weight: 113 kg (250 lb)   Height: 162.6 cm (64\")     Body mass index is 42.91 kg/m².    Vitals reviewed.   Constitutional:       Appearance: Well-developed. Morbidly obese.   Eyes:      General: No scleral icterus.        Right eye: No discharge.      Conjunctiva/sclera: Conjunctivae normal.      Pupils: Pupils are equal, round, and reactive to light.   HENT:      Head: Normocephalic.      Nose: Nose normal.   Neck:      Thyroid: No thyromegaly.      Vascular: No JVD.   Pulmonary:      Effort: Pulmonary effort is normal. No respiratory distress.      Breath sounds: Normal breath sounds. No wheezing. No rales.   Cardiovascular:      Tachycardia present. Regular rhythm. Normal S1. Normal S2.       Murmurs: There is no murmur.      No gallop.    Pulses:     Intact distal pulses.      Carotid: 2+ bilaterally.     Radial: 2+ bilaterally.     Femoral: 2+ bilaterally.     Dorsalis pedis: 2+ bilaterally.     Posterior tibial: 2+ bilaterally.  Edema:     Peripheral edema absent.   Abdominal:      General: Bowel sounds are normal. There is no distension.      Palpations: Abdomen is soft.      Tenderness: There is no abdominal tenderness. There is no rebound.   Musculoskeletal: Normal range of motion.         General: No tenderness.      Cervical back: Normal range of motion and neck supple. Skin:     General: Skin is warm and dry.      Findings: No erythema or rash.   Neurological:      Mental Status: Alert and oriented to person, place, and time.   Psychiatric:         " Behavior: Behavior normal.         Thought Content: Thought content normal.         Judgment: Judgment normal.       Lab Review:   Lab Results - Last 18 Months   Lab Units 11/21/24  0835 11/14/24  0917   WBC 10*3/mm3 5.94 6.90   RBC 10*6/mm3 3.23* 3.13*   HEMOGLOBIN g/dL 11.2* 11.0*   HEMATOCRIT % 33.0* 32.2*   MCV fL 102.2* 102.9*   MCH pg 34.7* 35.1*   MCHC g/dL 33.9 34.2   RDW % 14.4 14.4   PLATELETS 10*3/mm3 365 336   NEUTROPHIL % % 55.1 67.6   LYMPHOCYTE % % 27.8 17.2*   MONOCYTES % % 8.8 8.7   EOSINOPHIL % % 2.4 2.0   BASOPHIL % % 1.0 0.9   NEUTROS ABS 10*3/mm3 3.28 4.66   LYMPHS ABS 10*3/mm3 1.65 1.19   MONOS ABS 10*3/mm3 0.52 0.60   EOS ABS 10*3/mm3 0.14 0.14   BASOS ABS 10*3/mm3 0.06 0.06   RDW-SD fl 53.8 53.6   MPV fL 8.8 9.1       Lab Results - Last 18 Months   Lab Units 11/21/24  0835 11/14/24  0917   GLUCOSE mg/dL 153* 174*   BUN mg/dL 16 13   CREATININE mg/dL 0.72 0.70   SODIUM mmol/L 139 140   POTASSIUM mmol/L 4.1 4.2   CHLORIDE mmol/L 104 104   CO2 mmol/L 22.1 25.6   CALCIUM mg/dL 8.7 8.8   TOTAL PROTEIN g/dL 6.4 6.5   ALBUMIN g/dL 3.9 3.9   ALT (SGPT) U/L 46* 37*   AST (SGOT) U/L 33* 26   ALK PHOS U/L 98 94   BILIRUBIN mg/dL 0.4 0.5   GLOBULIN gm/dL 2.5 2.6   A/G RATIO g/dL 1.6 1.5   BUN / CREAT RATIO  22.2 18.6   ANION GAP mmol/L 12.9 10.4   EGFR mL/min/1.73 97.7 101.0         ECG 12 Lead    Date/Time: 11/13/2024 6:15 PM  Performed by: Nicolette Syed MD    Authorized by: Nicolette Syed MD  Rhythm: sinus tachycardia  Rhythm comments: Heart rate has increased further    Clinical impression: abnormal EKG        Assessment:       Diagnosis Plan   1. Encounter for antineoplastic chemotherapy  Adult Transthoracic Echo Complete W/ Cont if Necessary Per Protocol    Adult Stress Echo W/ Cont or Stress Agent if Necessary Per Protocol    Adult Transthoracic Echo Complete W/ Cont if Necessary Per Protocol    ECG 12 Lead      2. Acute cough  XR Chest 2 View    Viral Culture, Rapid, Respiratory - Swab, Nasopharynx     Adult Stress Echo W/ Cont or Stress Agent if Necessary Per Protocol    ECG 12 Lead      3. Dyspnea on exertion  Adult Stress Echo W/ Cont or Stress Agent if Necessary Per Protocol    Adult Transthoracic Echo Complete W/ Cont if Necessary Per Protocol    ECG 12 Lead      4. CHELO on CPAP          Plan:       1.  Chest pain this is clearly pleuritic and associate with recent cough.  2.  Cough.  Discussed with Dr. Chawla.  Have ordered a chest x-ray.  She will see patient tomorrow.  Have also started her on Zithromax as Dr. Chawla recommended.  3.  Dyspnea.  This has been present for several months.  Will check a stress echocardiogram to assess for ischemia as well as for or follow-up of chemotherapy.  4.  Left-sided breast cancer.  Received AC therapy and currently completing Taxol and possibly to receive additional treatment.  Plans for radiation therapy in the next several months.  2.  Cardio oncology care.  Recheck echo after AC therapy  3.  Probable sleep apnea.  4.  ADHD  5.  History of depression with suicidal ideation.  6.  Dyslipidemia, will check labs  7.  Elevated glucose, probable prediabetes with hemoglobin A1c 6.3 in 2022.  Will recheck labs    This visit was of HIGH complexity medical decision making.    Sd Man is a 59 y.o. female with multiple comorbidites;  she was seen for Drug therapy requiring intensive monitoring for toxicity (Taxol).     Time Spent: I spent 45 minutes caring for Sd on this date of service. This time includes time spent by me in the following activities: preparing for the visit, reviewing tests, obtaining and/or reviewing a separately obtained history, performing a medically appropriate examination and/or evaluation, counseling and educating the patient/family/caregiver, ordering medications, tests, or procedures, documenting information in the medical record, and independently interpreting results and communicating that information with the patient/family/caregiver.   I  spent 1 minutes on the separately reported service of Echo. This time is not included in the time used to support the E/M service also reported today.        Your medication list            Accurate as of November 13, 2024 11:59 PM. If you have any questions, ask your nurse or doctor.                START taking these medications        Instructions Last Dose Given Next Dose Due   azithromycin 250 MG tablet  Commonly known as: Zithromax Z-Ang  Started by: Mini Syed      Take 2 tablets by mouth on day 1, then 1 tablet daily on days 2-5              CHANGE how you take these medications        Instructions Last Dose Given Next Dose Due   amphetamine-dextroamphetamine 12.5 MG tablet  Commonly known as: ADDERALL  What changed: See the new instructions.      Take 1 tablet by mouth Every Night. 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 PT TO HOLD 48 HOURS PRIOR TO SURGERY       D3 VITAMIN PO      Take  by mouth.       lidocaine-prilocaine 2.5-2.5 % cream  Commonly known as: EMLA      APPLY TOPICALLY AS NEEDED FOR MILD PAIN. APPLY TO PORT SITE AS NEEDED FOR PAIN/DISCOMFORT       OLANZapine 5 MG tablet  Commonly known as: zyPREXA      Take 1 tablet by mouth Daily.       ondansetron 8 MG tablet  Commonly known as: ZOFRAN      Take 1 tablet by mouth Every 8 (Eight) Hours As Needed for Nausea or Vomiting.       Vortioxetine HBr 20 MG tablet  Commonly known as: TRINTELLIX      Take 1 tablet by mouth Daily.                 Where to Get Your Medications        These medications were sent to [id number] - [place], [place] - [address] [address] [facility] [phone]  - [phone] FX  [address] [address] [address]      Phone: [phone]    azithromycin 250 MG tablet         Patient is no longer taking -.  I corrected the med list to reflect this.  I did not stop these medications.      Dictated utilizing Dragon dictation

## 2024-11-13 ENCOUNTER — HOSPITAL ENCOUNTER (OUTPATIENT)
Dept: GENERAL RADIOLOGY | Facility: HOSPITAL | Age: 59
Discharge: HOME | End: 2024-11-13
Admitting: INTERNAL MEDICINE
Payer: COMMERCIAL

## 2024-11-13 ENCOUNTER — TRANSCRIBE ORDERS (OUTPATIENT)
Dept: SURGERY | Facility: CLINIC | Age: 59
End: 2024-11-13
Payer: COMMERCIAL

## 2024-11-13 ENCOUNTER — TELEPHONE (OUTPATIENT)
Dept: CARDIOLOGY | Facility: CLINIC | Age: 59
End: 2024-11-13

## 2024-11-13 ENCOUNTER — OFFICE VISIT (OUTPATIENT)
Dept: CARDIOLOGY | Facility: CLINIC | Age: 59
End: 2024-11-13
Payer: COMMERCIAL

## 2024-11-13 VITALS
RESPIRATION RATE: 16 BRPM | HEART RATE: 120 BPM | HEIGHT: 64 IN | BODY MASS INDEX: 42.68 KG/M2 | DIASTOLIC BLOOD PRESSURE: 84 MMHG | OXYGEN SATURATION: 97 % | SYSTOLIC BLOOD PRESSURE: 124 MMHG | WEIGHT: 250 LBS

## 2024-11-13 DIAGNOSIS — R05.1 ACUTE COUGH: ICD-10-CM

## 2024-11-13 DIAGNOSIS — R06.09 DYSPNEA ON EXERTION: ICD-10-CM

## 2024-11-13 DIAGNOSIS — Z51.11 ENCOUNTER FOR ANTINEOPLASTIC CHEMOTHERAPY: Primary | ICD-10-CM

## 2024-11-13 DIAGNOSIS — G47.33 OSA ON CPAP: ICD-10-CM

## 2024-11-13 PROCEDURE — 71046 X-RAY EXAM CHEST 2 VIEWS: CPT

## 2024-11-13 RX ORDER — AZITHROMYCIN 250 MG/1
TABLET, FILM COATED ORAL
Qty: 6 TABLET | Refills: 0 | Status: SHIPPED | OUTPATIENT
Start: 2024-11-13

## 2024-11-13 NOTE — TELEPHONE ENCOUNTER
Please let patient know that I talked with Dr. Chawla and she is concerned about possible mycoplasma infection which is on the rise.  She would like her to start a Z-Ang.  (Antibiotic).  I have sent in this prescription to her pharmacy on [address] in [place].  Dr. Chawla would like her to start this today.

## 2024-11-13 NOTE — TELEPHONE ENCOUNTER
Reviewed recommendations with patient, verbalized understanding, will call with any further questions or complaints.  Pt states that she will start the z-ronna today as recommended.    Portia Walker RN  Triage Nurse  11/13/24 11:56 EST

## 2024-11-14 ENCOUNTER — OFFICE VISIT (OUTPATIENT)
Dept: ONCOLOGY | Facility: CLINIC | Age: 59
End: 2024-11-14
Payer: COMMERCIAL

## 2024-11-14 ENCOUNTER — INFUSION (OUTPATIENT)
Dept: ONCOLOGY | Facility: HOSPITAL | Age: 59
End: 2024-11-14
Payer: COMMERCIAL

## 2024-11-14 ENCOUNTER — TELEPHONE (OUTPATIENT)
Dept: CARDIOLOGY | Facility: CLINIC | Age: 59
End: 2024-11-14
Payer: COMMERCIAL

## 2024-11-14 VITALS
DIASTOLIC BLOOD PRESSURE: 82 MMHG | OXYGEN SATURATION: 97 % | BODY MASS INDEX: 42.87 KG/M2 | HEIGHT: 64 IN | TEMPERATURE: 98 F | SYSTOLIC BLOOD PRESSURE: 121 MMHG | RESPIRATION RATE: 20 BRPM | HEART RATE: 120 BPM | WEIGHT: 251.1 LBS

## 2024-11-14 DIAGNOSIS — R05.1 ACUTE COUGH: ICD-10-CM

## 2024-11-14 DIAGNOSIS — C50.412 MALIGNANT NEOPLASM OF UPPER-OUTER QUADRANT OF LEFT BREAST IN FEMALE, ESTROGEN RECEPTOR POSITIVE: Primary | ICD-10-CM

## 2024-11-14 DIAGNOSIS — Z45.2 ENCOUNTER FOR FITTING AND ADJUSTMENT OF VASCULAR CATHETER: ICD-10-CM

## 2024-11-14 DIAGNOSIS — Z17.0 MALIGNANT NEOPLASM OF UPPER-OUTER QUADRANT OF LEFT BREAST IN FEMALE, ESTROGEN RECEPTOR POSITIVE: Primary | ICD-10-CM

## 2024-11-14 DIAGNOSIS — R05.1 ACUTE COUGH: Primary | ICD-10-CM

## 2024-11-14 LAB
ALBUMIN SERPL-MCNC: 3.9 G/DL (ref 3.5–5.2)
ALBUMIN/GLOB SERPL: 1.5 G/DL
ALP SERPL-CCNC: 94 U/L (ref 39–117)
ALT SERPL W P-5'-P-CCNC: 37 U/L (ref 1–33)
ANION GAP SERPL CALCULATED.3IONS-SCNC: 10.4 MMOL/L (ref 5–15)
AST SERPL-CCNC: 26 U/L (ref 1–32)
B PARAPERT DNA SPEC QL NAA+PROBE: NOT DETECTED
B PERT DNA SPEC QL NAA+PROBE: NOT DETECTED
BASOPHILS # BLD AUTO: 0.06 10*3/MM3 (ref 0–0.2)
BASOPHILS NFR BLD AUTO: 0.9 % (ref 0–1.5)
BILIRUB SERPL-MCNC: 0.5 MG/DL (ref 0–1.2)
BUN SERPL-MCNC: 13 MG/DL (ref 6–20)
BUN/CREAT SERPL: 18.6 (ref 7–25)
C PNEUM DNA NPH QL NAA+NON-PROBE: NOT DETECTED
CALCIUM SPEC-SCNC: 8.8 MG/DL (ref 8.6–10.5)
CHLORIDE SERPL-SCNC: 104 MMOL/L (ref 98–107)
CO2 SERPL-SCNC: 25.6 MMOL/L (ref 22–29)
CREAT SERPL-MCNC: 0.7 MG/DL (ref 0.57–1)
DEPRECATED RDW RBC AUTO: 53.6 FL (ref 37–54)
EGFRCR SERPLBLD CKD-EPI 2021: 101 ML/MIN/1.73
EOSINOPHIL # BLD AUTO: 0.14 10*3/MM3 (ref 0–0.4)
EOSINOPHIL NFR BLD AUTO: 2 % (ref 0.3–6.2)
ERYTHROCYTE [DISTWIDTH] IN BLOOD BY AUTOMATED COUNT: 14.4 % (ref 12.3–15.4)
FLUAV SUBTYP SPEC NAA+PROBE: NOT DETECTED
FLUBV RNA ISLT QL NAA+PROBE: NOT DETECTED
GLOBULIN UR ELPH-MCNC: 2.6 GM/DL
GLUCOSE SERPL-MCNC: 174 MG/DL (ref 65–99)
HADV DNA SPEC NAA+PROBE: NOT DETECTED
HCOV 229E RNA SPEC QL NAA+PROBE: NOT DETECTED
HCOV HKU1 RNA SPEC QL NAA+PROBE: NOT DETECTED
HCOV NL63 RNA SPEC QL NAA+PROBE: NOT DETECTED
HCOV OC43 RNA SPEC QL NAA+PROBE: NOT DETECTED
HCT VFR BLD AUTO: 32.2 % (ref 34–46.6)
HGB BLD-MCNC: 11 G/DL (ref 12–15.9)
HMPV RNA NPH QL NAA+NON-PROBE: NOT DETECTED
HPIV1 RNA ISLT QL NAA+PROBE: NOT DETECTED
HPIV2 RNA SPEC QL NAA+PROBE: NOT DETECTED
HPIV3 RNA NPH QL NAA+PROBE: NOT DETECTED
HPIV4 P GENE NPH QL NAA+PROBE: NOT DETECTED
IMM GRANULOCYTES # BLD AUTO: 0.25 10*3/MM3 (ref 0–0.05)
IMM GRANULOCYTES NFR BLD AUTO: 3.6 % (ref 0–0.5)
LYMPHOCYTES # BLD AUTO: 1.19 10*3/MM3 (ref 0.7–3.1)
LYMPHOCYTES NFR BLD AUTO: 17.2 % (ref 19.6–45.3)
M PNEUMO IGG SER IA-ACNC: NOT DETECTED
MCH RBC QN AUTO: 35.1 PG (ref 26.6–33)
MCHC RBC AUTO-ENTMCNC: 34.2 G/DL (ref 31.5–35.7)
MCV RBC AUTO: 102.9 FL (ref 79–97)
MONOCYTES # BLD AUTO: 0.6 10*3/MM3 (ref 0.1–0.9)
MONOCYTES NFR BLD AUTO: 8.7 % (ref 5–12)
NEUTROPHILS NFR BLD AUTO: 4.66 10*3/MM3 (ref 1.7–7)
NEUTROPHILS NFR BLD AUTO: 67.6 % (ref 42.7–76)
NRBC BLD AUTO-RTO: 0.4 /100 WBC (ref 0–0.2)
PLATELET # BLD AUTO: 336 10*3/MM3 (ref 140–450)
PMV BLD AUTO: 9.1 FL (ref 6–12)
POTASSIUM SERPL-SCNC: 4.2 MMOL/L (ref 3.5–5.2)
PROT SERPL-MCNC: 6.5 G/DL (ref 6–8.5)
RBC # BLD AUTO: 3.13 10*6/MM3 (ref 3.77–5.28)
RHINOVIRUS RNA SPEC NAA+PROBE: NOT DETECTED
RSV RNA NPH QL NAA+NON-PROBE: NOT DETECTED
SARS-COV-2 RNA NPH QL NAA+NON-PROBE: NOT DETECTED
SODIUM SERPL-SCNC: 140 MMOL/L (ref 136–145)
WBC NRBC COR # BLD AUTO: 6.9 10*3/MM3 (ref 3.4–10.8)

## 2024-11-14 PROCEDURE — 96375 TX/PRO/DX INJ NEW DRUG ADDON: CPT

## 2024-11-14 PROCEDURE — 25010000002 DIPHENHYDRAMINE PER 50 MG: Performed by: INTERNAL MEDICINE

## 2024-11-14 PROCEDURE — 25010000002 HEPARIN LOCK FLUSH PER 10 UNITS: Performed by: INTERNAL MEDICINE

## 2024-11-14 PROCEDURE — 96413 CHEMO IV INFUSION 1 HR: CPT

## 2024-11-14 PROCEDURE — 25010000002 DEXAMETHASONE SODIUM PHOSPHATE 100 MG/10ML SOLUTION: Performed by: INTERNAL MEDICINE

## 2024-11-14 PROCEDURE — 25010000002 PACLITAXEL PER 1 MG: Performed by: INTERNAL MEDICINE

## 2024-11-14 PROCEDURE — 25810000003 SODIUM CHLORIDE 0.9 % SOLUTION 250 ML FLEX CONT: Performed by: INTERNAL MEDICINE

## 2024-11-14 PROCEDURE — 25010000002 ONDANSETRON PER 1 MG: Performed by: INTERNAL MEDICINE

## 2024-11-14 PROCEDURE — 0202U NFCT DS 22 TRGT SARS-COV-2: CPT | Performed by: INTERNAL MEDICINE

## 2024-11-14 PROCEDURE — 85025 COMPLETE CBC W/AUTO DIFF WBC: CPT | Performed by: INTERNAL MEDICINE

## 2024-11-14 PROCEDURE — 80053 COMPREHEN METABOLIC PANEL: CPT | Performed by: INTERNAL MEDICINE

## 2024-11-14 RX ORDER — FAMOTIDINE 10 MG/ML
20 INJECTION, SOLUTION INTRAVENOUS ONCE
Status: COMPLETED | OUTPATIENT
Start: 2024-11-14 | End: 2024-11-14

## 2024-11-14 RX ORDER — SODIUM CHLORIDE 9 MG/ML
20 INJECTION, SOLUTION INTRAVENOUS ONCE
Status: CANCELLED | OUTPATIENT
Start: 2024-11-14

## 2024-11-14 RX ORDER — SODIUM CHLORIDE 0.9 % (FLUSH) 0.9 %
10 SYRINGE (ML) INJECTION AS NEEDED
Status: DISCONTINUED | OUTPATIENT
Start: 2024-11-14 | End: 2024-11-14 | Stop reason: HOSPADM

## 2024-11-14 RX ORDER — DIPHENHYDRAMINE HYDROCHLORIDE 50 MG/ML
25 INJECTION INTRAMUSCULAR; INTRAVENOUS ONCE
Status: COMPLETED | OUTPATIENT
Start: 2024-11-14 | End: 2024-11-14

## 2024-11-14 RX ORDER — FAMOTIDINE 10 MG/ML
20 INJECTION, SOLUTION INTRAVENOUS AS NEEDED
Status: CANCELLED | OUTPATIENT
Start: 2024-11-14

## 2024-11-14 RX ORDER — HEPARIN SODIUM (PORCINE) LOCK FLUSH IV SOLN 100 UNIT/ML 100 UNIT/ML
500 SOLUTION INTRAVENOUS AS NEEDED
OUTPATIENT
Start: 2024-11-14

## 2024-11-14 RX ORDER — HEPARIN SODIUM (PORCINE) LOCK FLUSH IV SOLN 100 UNIT/ML 100 UNIT/ML
500 SOLUTION INTRAVENOUS AS NEEDED
Status: DISCONTINUED | OUTPATIENT
Start: 2024-11-14 | End: 2024-11-14 | Stop reason: HOSPADM

## 2024-11-14 RX ORDER — DIPHENHYDRAMINE HYDROCHLORIDE 50 MG/ML
50 INJECTION INTRAMUSCULAR; INTRAVENOUS AS NEEDED
Status: CANCELLED | OUTPATIENT
Start: 2024-11-14

## 2024-11-14 RX ORDER — SODIUM CHLORIDE 0.9 % (FLUSH) 0.9 %
10 SYRINGE (ML) INJECTION AS NEEDED
OUTPATIENT
Start: 2024-11-14

## 2024-11-14 RX ADMIN — ONDANSETRON 8 MG: 2 INJECTION INTRAMUSCULAR; INTRAVENOUS at 10:41

## 2024-11-14 RX ADMIN — PACLITAXEL 170 MG: 6 INJECTION, SOLUTION INTRAVENOUS at 11:34

## 2024-11-14 RX ADMIN — Medication 10 ML: at 12:40

## 2024-11-14 RX ADMIN — Medication 500 UNITS: at 12:40

## 2024-11-14 RX ADMIN — DIPHENHYDRAMINE HYDROCHLORIDE 25 MG: 50 INJECTION INTRAMUSCULAR; INTRAVENOUS at 10:14

## 2024-11-14 RX ADMIN — FAMOTIDINE 20 MG: 10 INJECTION INTRAVENOUS at 10:14

## 2024-11-14 RX ADMIN — DEXAMETHASONE SODIUM PHOSPHATE 12 MG: 10 INJECTION, SOLUTION INTRAMUSCULAR; INTRAVENOUS at 10:17

## 2024-11-14 NOTE — TELEPHONE ENCOUNTER
Please let her know that chest x-ray did not show evidence of pneumonia or heart failure.  No acute changes to explain her cough.  Will await echocardiogram to be done next week.

## 2024-11-14 NOTE — PROGRESS NOTES
Subjective   Sd Man is a 59 y.o. female.  Referred by Dr. Mccain for left breast invasive ductal carcinoma    History of Present Illness     Patient is a 59-year-old postmenopausal lady who presented with a palpable abnormality of the left breast She initially felt and March 2024.  Patient feels like the mass has increased in size since she initially palpated it.  She brought this to the attention of her gynecologist who ordered diagnostic imaging and further workup.    Patient denies any previous abnormal mammograms or breast biopsies.    She has a diagnosis of PCOS and possible diabetes.  Her last normal mammogram prior to the most recent one was in 2015.    Patient reports extremely stressful situation at home with 3 of her daughters having had mental health issues and her ex- with bipolar disorder.  She has been dealing with a lot of stress since 2017.  She currently works as a .  She was working at [employer] in the past but no longer there.    Patient currently sees [facility] and on medications for anxiety and depression.  She also sees a counselor on a regular basis and has an appointment coming up with them.    Her support system is mainly her parents who live in [place] and also she has a few friends here.  During the summer if she were to do chemotherapy she prefers to get that done in [place] and subsequently transferred care back to us.    4/9/2024-bilateral diagnostic mammogram  Breast that heterogeneously dense.  Finding 1 high density irregular mass measuring 20 mm with indistinct margins at 2:00 in the left breast, 11 cm from the nipple.  This indicates the palpable lump in the left breast.  No suspicious abnormalities of the right breast.    Left breast ultrasound  Finding 1.irregular solid mass measuring 20 x 17 x 20 mm in the posterior one third left breast, 2:00, 11 cm from the nipple.  Finding 2.lymph node measures 20 x 9 x 13 mm on the left  breast.  4 mm cortical thickness noted.    Impression  Finding 1.solid mass in the left breast is suspicious, ultrasound-guided biopsy recommended  Finding 2.lymph node in the left breast is suspicious, FNA and possible spring-loaded or limited core biopsy is recommended.    4/16/2024-left breast ultrasound-guided biopsy  1.left breast 2:00-invasive ductal carcinoma  Grade 3  Invasive carcinoma involves multiple tissue cores and measures up to 14 mm  ER +91 to 100% strong  TN +31 to 40% moderate  HER2 negative, 1+  Ki-67 95%    2.lymph node of the left axilla with free-floating fragments of carcinoma, suspicious for metastasis.    5/10/2024-bilateral breast MRI  Biopsy-proven malignancy in the left breast measuring 2.7 cm at 3:00.  No other suspicious findings are seen.  Left axilla lymph node has undergone prior biopsy and shows diffuse cortical thickening suspicious for left axilla lymphadenopathy.  No suspicious abnormalities of the right breast.    5/23/2024-CT of the chest abdomen and pelvis  1.bone island in the right iliac wing, subchondral cyst in the left acetabulum.  2.2 0.4 cm rounded density in the lateral aspect of the left breast with a surgical marker.  2.4 cm left axillary lymph node.  3.12 millimeters subcapsular cyst in the left lobe of the liver, attenuation compatible with serous fluid.  Second likely cyst within the right lobe of the liver measuring 15 mm.  Attenuation compatible with slightly complex fluid.  No grossly suspicious liver lesion identified.  Further imaging of the liver with an ultrasound recommended.  4.no clear evidence of metastatic disease.    5/23/2024-bone scan without any evidence of metastatic disease    Patient presents today to discuss neoadjuvant chemotherapy given the high-grade and high Ki-67 of the tumor although the tumor is strongly ER positive.    She is extremely reluctant to the idea of receiving chemotherapy as she is concerned about feeling sick and she wants  to work for at least 1 year.    Oncotype DX recurrence score on the core biopsy returned at 73 with a greater than 32% risk of distant metastasis with AI or tamoxifen alone.  Significant benefit from chemotherapy noted.    Patient had moved to [place] after the initial diagnosis and initiated neoadjuvant chemotherapy with dose dense Adriamycin and Cytoxan under the care of  at [facility] oncology in [place], [place].  She completed the 4 cycles of dose dense Adriamycin and Cytoxan and currently receiving weekly Taxol.  Chemotherapy initiated on 7/12/2024.  Patient has not had any chemotherapy dose delays or interruptions.    Interval history  Sd corley returns today for follow-up.  She went camping and stayed in a super 8 where she turned the arm and the next morning ended up with a severe cough which is nonproductive.  She also reports a low-grade fever.  Her heart rate is also elevated at 120.  Denies any lightheadedness or dizziness or chest pain.  A chest x-ray was obtained yesterday which shows some left lower lobe opacity but no other significant abnormalities.  She has been started on a Z-Ang and reports some improvement in symptoms.  Scheduled for week 11 of Taxol.    The following portions of the patient's history were reviewed and updated as appropriate: allergies, current medications, past family history, past medical history, past social history, past surgical history, and problem list.    Past Medical History:   Diagnosis Date    ADHD     Anxiety     Breast cancer     Depression     Hypoxemia associated with sleep     Insulin resistance     Metabolic syndrome X     Obesity 11/11/2012    CHELO on CPAP 06/17/2024    Home sleep study.  Weight 242 pounds.  Moderate CHELO with AHI 19.4 events per hour.  Low oxygen saturation 66% and sleep-related hypoxia abnormal for 43.4 minutes.    PCOS (polycystic ovarian syndrome)     PTSD (post-traumatic stress disorder)         Past Surgical History:  "  Procedure Laterality Date    OOPHORECTOMY Bilateral 2014    VENOUS ACCESS DEVICE (PORT) INSERTION Right 2024    Procedure: INSERTION VENOUS ACCESS DEVICE;  Surgeon: Moira Mccain MD;  Location: [facility] OR;  Service: General;  Laterality: Right;        Family History   Problem Relation Age of Onset    Melanoma Mother 75        alive currently 79    Atrial fibrillation Father     Hypertension Father     Malig Hyperthermia Neg Hx         Social History     Socioeconomic History    Marital status:    Tobacco Use    Smoking status: Never     Passive exposure: Never    Smokeless tobacco: Never   Vaping Use    Vaping status: Never Used   Substance and Sexual Activity    Alcohol use: Never    Drug use: Never    Sexual activity: Not Currently        OB History    No obstetric history on file.      Age at menarche-14  Age at first live childbirth-32   3 para 3  2  Age at menopause-47  Breast-feeding for 2 to 3 years  Oral contraceptive pill use none  Hormone replacement therapy for 2 years    No Known Allergies       Objective   Blood pressure 121/82, pulse 120, temperature 98 °F (36.7 °C), temperature source Oral, resp. rate 20, height 162 cm (63.78\"), weight 114 kg (251 lb 1.6 oz), SpO2 97%.     Physical Exam  Vitals reviewed.   Constitutional:       Appearance: Normal appearance. She is obese.   HENT:      Nose: Nose normal.      Mouth/Throat:      Pharynx: Oropharynx is clear.   Eyes:      Conjunctiva/sclera: Conjunctivae normal.   Cardiovascular:      Rate and Rhythm: Normal rate and regular rhythm.   Pulmonary:      Effort: Pulmonary effort is normal.   Abdominal:      General: Abdomen is flat.   Musculoskeletal:         General: Normal range of motion.      Cervical back: Normal range of motion.   Skin:     General: Skin is warm and dry.   Neurological:      General: No focal deficit present.      Mental Status: She is alert and oriented to person, place, and time.   Psychiatric: "         Mood and Affect: Mood normal.         Behavior: Behavior normal.         Thought Content: Thought content normal.         Judgment: Judgment normal.         Breast exam:  Right breast: Appears normal on inspection.  No palpable abnormalities of the right breast.  Left breast: On inspection no significant abnormalities other than biopsy site changes.  The left axillary mass at 2:00 as well as the left axillary lymphadenopathy is no longer palpable.  No cervical or supraclavicular lymphadenopathy.    I have reexamined the patient and the results are consistent with the previously documented exam. Kristan Chawla MD      Labs:                    No radiology results for the last 30 days.     MRI of the breast images independently reviewed interpreted by me in detail summarized above  CT chest abdomen pelvis and bone scan images independently reviewed and interpreted by me in detail summarized above    6/4/2024-ultrasound of the liver-echogenic liver consistent with fatty infiltration  1.6 cm benign-appearing right hepatic lobe cyst.    Reviewed ultrasound images independently and interpreted them independently.    Assessment & Plan   *Left breast invasive ductal carcinoma  Clinical T2 N1a M0, Anatomic stage IIb, prognostic stage IIa.  The tumor is grade 3, Ki-67 95%, ER +91 to 100% strong, CT +31 to 40% moderate.  Oncotype DX recurrence score of the tumor noted to be 73 with a greater than 32% risk of distant metastasis with AI or tamoxifen alone.  Significant benefit from chemotherapy.  Initiated dose dense Adriamycin and Cytoxan on 7/12/2024 at [facility] under the care of Dr. Cash  Noted to have a significant response in the breast after initiation of neoadjuvant chemotherapy.  10/14/2024-due for week 11 of Taxol.  Patient has nonproductive cough and a low-grade fever concerning for atypical pneumonia.  She has been started on azithromycin   labs reviewed and overall stable to proceed with  Taxol.    *Anemia  Hemoglobin has decreased to 9.9, secondary to chemotherapy  Continue to monitor  Patient reports fatigue as a result of anemia  Hemoglobin stable at 11    *Severe anxiety and suicidal thoughts  She is a current patient of [facility].  She sees a psychiatric nurse practitioner as well as a counselor and has an appointment with them.  Currently on Trintellix 10 mg.  Severe stressors as reported in the HPI.  11/7/2024: Reports increased stress and anxiety related to the recent election.  11/14/2024-mood much better    *Obesity  Class III  BMI 43.4    *Elevated Liver enzymes  10/2/2024: ALT 90 and AST 53. Will continue to monitor. Advised against tylenol.   10/16/2024-LFTs have improved With an ALT of 64 and AST of 43.  9/14/2024-AST 37, stable    *Possible diabetes-she will require follow-up with her primary care physician for management of the same.    *Cardiac health-she has seen Dr. Syed and echocardiogram has been obtained which shows an ejection fraction of 65% with a GLS of -19.6%    *Cough and tachycardia  Concerning for atypical pneumonia  Patient started on azithromycin  Anticipate improvement in symptoms obtain respiratory viral panel  Labs stable and patient is afebrile today, proceed with planned chemotherapy    Plan:   Proceed with week 11 of Taxol today.  Return in 1 week for week 12 of Taxol  Monitor LFTs closely  Z-Ang for possible atypical pneumonia  Respiratory viral panel today  Patient knows to call if there is any worsening of symptoms.    High risk medication requiring close monitoring for toxicity.  Reviewed the chest x-ray images independently and interpreted them independently.  Discussed the cough, x-ray with Dr. Syed.

## 2024-11-15 NOTE — TELEPHONE ENCOUNTER
Reviewed results and recommendations with Sd Man.  Patient verbalized understanding of results and recommendations.    Thank you,  Doreen GILLILAND RN  Triage Nurse [facility]  11/15/24    08:38 EST

## 2024-11-19 RX ORDER — OLANZAPINE 5 MG/1
5 TABLET ORAL DAILY
Qty: 30 TABLET | Refills: 0 | Status: SHIPPED | OUTPATIENT
Start: 2024-11-19

## 2024-11-19 NOTE — TELEPHONE ENCOUNTER
Caller: Sd Man    Relationship: Self    Best call back number: [phone]    Requested Prescriptions:   Requested Prescriptions     Pending Prescriptions Disp Refills    OLANZapine (zyPREXA) 5 MG tablet 30 tablet 0     Sig: Take 1 tablet by mouth Daily.        Pharmacy where request should be sent: [id number] [address] AT [facility] [phone] [facility] [phone]      Last office visit with prescribing clinician: 11/14/2024   Last telemedicine visit with prescribing clinician: Visit date not found   Next office visit with prescribing clinician: 11/21/2024     Additional details provided by patient: PT NEEDS TO SPEAK WITH TREY COLLADO REGARDING ADA PAPERWORK.    Does the patient have less than a 3 day supply:  [x] Yes  [] No    Would you like a call back once the refill request has been completed: [] Yes [x] No    If the office needs to give you a call back, can they leave a voicemail: [] Yes [x] No

## 2024-11-21 ENCOUNTER — TELEPHONE (OUTPATIENT)
Dept: CARDIOLOGY | Facility: CLINIC | Age: 59
End: 2024-11-21
Payer: COMMERCIAL

## 2024-11-21 ENCOUNTER — INFUSION (OUTPATIENT)
Dept: ONCOLOGY | Facility: HOSPITAL | Age: 59
End: 2024-11-21
Payer: COMMERCIAL

## 2024-11-21 ENCOUNTER — OFFICE VISIT (OUTPATIENT)
Dept: ONCOLOGY | Facility: CLINIC | Age: 59
End: 2024-11-21
Payer: COMMERCIAL

## 2024-11-21 ENCOUNTER — APPOINTMENT (OUTPATIENT)
Dept: ONCOLOGY | Facility: HOSPITAL | Age: 59
End: 2024-11-21
Payer: COMMERCIAL

## 2024-11-21 ENCOUNTER — HOSPITAL ENCOUNTER (OUTPATIENT)
Dept: GENERAL RADIOLOGY | Facility: HOSPITAL | Age: 59
Discharge: HOME | End: 2024-11-21
Admitting: INTERNAL MEDICINE
Payer: COMMERCIAL

## 2024-11-21 VITALS
RESPIRATION RATE: 16 BRPM | HEIGHT: 64 IN | OXYGEN SATURATION: 97 % | SYSTOLIC BLOOD PRESSURE: 119 MMHG | HEART RATE: 73 BPM | WEIGHT: 251.3 LBS | TEMPERATURE: 98.2 F | BODY MASS INDEX: 42.9 KG/M2 | DIASTOLIC BLOOD PRESSURE: 81 MMHG

## 2024-11-21 DIAGNOSIS — Z17.0 MALIGNANT NEOPLASM OF UPPER-OUTER QUADRANT OF LEFT BREAST IN FEMALE, ESTROGEN RECEPTOR POSITIVE: Primary | ICD-10-CM

## 2024-11-21 DIAGNOSIS — C50.412 MALIGNANT NEOPLASM OF UPPER-OUTER QUADRANT OF LEFT BREAST IN FEMALE, ESTROGEN RECEPTOR POSITIVE: ICD-10-CM

## 2024-11-21 DIAGNOSIS — M79.672 LEFT FOOT PAIN: ICD-10-CM

## 2024-11-21 DIAGNOSIS — Z45.2 ENCOUNTER FOR FITTING AND ADJUSTMENT OF VASCULAR CATHETER: ICD-10-CM

## 2024-11-21 DIAGNOSIS — Z17.0 MALIGNANT NEOPLASM OF UPPER-OUTER QUADRANT OF LEFT BREAST IN FEMALE, ESTROGEN RECEPTOR POSITIVE: ICD-10-CM

## 2024-11-21 DIAGNOSIS — C50.412 MALIGNANT NEOPLASM OF UPPER-OUTER QUADRANT OF LEFT BREAST IN FEMALE, ESTROGEN RECEPTOR POSITIVE: Primary | ICD-10-CM

## 2024-11-21 LAB
ALBUMIN SERPL-MCNC: 3.9 G/DL (ref 3.5–5.2)
ALBUMIN/GLOB SERPL: 1.6 G/DL
ALP SERPL-CCNC: 98 U/L (ref 39–117)
ALT SERPL W P-5'-P-CCNC: 46 U/L (ref 1–33)
ANION GAP SERPL CALCULATED.3IONS-SCNC: 12.9 MMOL/L (ref 5–15)
AST SERPL-CCNC: 33 U/L (ref 1–32)
BASOPHILS # BLD AUTO: 0.06 10*3/MM3 (ref 0–0.2)
BASOPHILS NFR BLD AUTO: 1 % (ref 0–1.5)
BILIRUB SERPL-MCNC: 0.4 MG/DL (ref 0–1.2)
BUN SERPL-MCNC: 16 MG/DL (ref 6–20)
BUN/CREAT SERPL: 22.2 (ref 7–25)
CALCIUM SPEC-SCNC: 8.7 MG/DL (ref 8.6–10.5)
CHLORIDE SERPL-SCNC: 104 MMOL/L (ref 98–107)
CO2 SERPL-SCNC: 22.1 MMOL/L (ref 22–29)
CREAT SERPL-MCNC: 0.72 MG/DL (ref 0.57–1)
DEPRECATED RDW RBC AUTO: 53.8 FL (ref 37–54)
EGFRCR SERPLBLD CKD-EPI 2021: 97.7 ML/MIN/1.73
EOSINOPHIL # BLD AUTO: 0.14 10*3/MM3 (ref 0–0.4)
EOSINOPHIL NFR BLD AUTO: 2.4 % (ref 0.3–6.2)
ERYTHROCYTE [DISTWIDTH] IN BLOOD BY AUTOMATED COUNT: 14.4 % (ref 12.3–15.4)
GLOBULIN UR ELPH-MCNC: 2.5 GM/DL
GLUCOSE SERPL-MCNC: 153 MG/DL (ref 65–99)
HCT VFR BLD AUTO: 33 % (ref 34–46.6)
HGB BLD-MCNC: 11.2 G/DL (ref 12–15.9)
IMM GRANULOCYTES # BLD AUTO: 0.29 10*3/MM3 (ref 0–0.05)
IMM GRANULOCYTES NFR BLD AUTO: 4.9 % (ref 0–0.5)
LYMPHOCYTES # BLD AUTO: 1.65 10*3/MM3 (ref 0.7–3.1)
LYMPHOCYTES NFR BLD AUTO: 27.8 % (ref 19.6–45.3)
MCH RBC QN AUTO: 34.7 PG (ref 26.6–33)
MCHC RBC AUTO-ENTMCNC: 33.9 G/DL (ref 31.5–35.7)
MCV RBC AUTO: 102.2 FL (ref 79–97)
MONOCYTES # BLD AUTO: 0.52 10*3/MM3 (ref 0.1–0.9)
MONOCYTES NFR BLD AUTO: 8.8 % (ref 5–12)
NEUTROPHILS NFR BLD AUTO: 3.28 10*3/MM3 (ref 1.7–7)
NEUTROPHILS NFR BLD AUTO: 55.1 % (ref 42.7–76)
NRBC BLD AUTO-RTO: 0.5 /100 WBC (ref 0–0.2)
PLATELET # BLD AUTO: 365 10*3/MM3 (ref 140–450)
PMV BLD AUTO: 8.8 FL (ref 6–12)
POTASSIUM SERPL-SCNC: 4.1 MMOL/L (ref 3.5–5.2)
PROT SERPL-MCNC: 6.4 G/DL (ref 6–8.5)
RBC # BLD AUTO: 3.23 10*6/MM3 (ref 3.77–5.28)
SODIUM SERPL-SCNC: 139 MMOL/L (ref 136–145)
WBC NRBC COR # BLD AUTO: 5.94 10*3/MM3 (ref 3.4–10.8)

## 2024-11-21 PROCEDURE — 73620 X-RAY EXAM OF FOOT: CPT

## 2024-11-21 PROCEDURE — 25010000002 DEXAMETHASONE SODIUM PHOSPHATE 100 MG/10ML SOLUTION: Performed by: INTERNAL MEDICINE

## 2024-11-21 PROCEDURE — 96413 CHEMO IV INFUSION 1 HR: CPT

## 2024-11-21 PROCEDURE — 80053 COMPREHEN METABOLIC PANEL: CPT | Performed by: INTERNAL MEDICINE

## 2024-11-21 PROCEDURE — 25810000003 SODIUM CHLORIDE 0.9 % SOLUTION 250 ML FLEX CONT: Performed by: INTERNAL MEDICINE

## 2024-11-21 PROCEDURE — 85025 COMPLETE CBC W/AUTO DIFF WBC: CPT | Performed by: INTERNAL MEDICINE

## 2024-11-21 PROCEDURE — 25010000002 ONDANSETRON PER 1 MG: Performed by: INTERNAL MEDICINE

## 2024-11-21 PROCEDURE — 96375 TX/PRO/DX INJ NEW DRUG ADDON: CPT

## 2024-11-21 PROCEDURE — 25010000002 DIPHENHYDRAMINE PER 50 MG: Performed by: INTERNAL MEDICINE

## 2024-11-21 PROCEDURE — 25810000003 SODIUM CHLORIDE 0.9 % SOLUTION: Performed by: INTERNAL MEDICINE

## 2024-11-21 PROCEDURE — 25010000002 HEPARIN LOCK FLUSH PER 10 UNITS: Performed by: INTERNAL MEDICINE

## 2024-11-21 PROCEDURE — 25010000002 PACLITAXEL PER 1 MG: Performed by: INTERNAL MEDICINE

## 2024-11-21 RX ORDER — SODIUM CHLORIDE 9 MG/ML
20 INJECTION, SOLUTION INTRAVENOUS ONCE
Status: COMPLETED | OUTPATIENT
Start: 2024-11-21 | End: 2024-11-21

## 2024-11-21 RX ORDER — FAMOTIDINE 10 MG/ML
20 INJECTION, SOLUTION INTRAVENOUS ONCE
Status: COMPLETED | OUTPATIENT
Start: 2024-11-21 | End: 2024-11-21

## 2024-11-21 RX ORDER — SODIUM CHLORIDE 0.9 % (FLUSH) 0.9 %
10 SYRINGE (ML) INJECTION AS NEEDED
Status: DISCONTINUED | OUTPATIENT
Start: 2024-11-21 | End: 2024-11-21 | Stop reason: HOSPADM

## 2024-11-21 RX ORDER — SODIUM CHLORIDE 9 MG/ML
20 INJECTION, SOLUTION INTRAVENOUS ONCE
Status: CANCELLED | OUTPATIENT
Start: 2024-11-21

## 2024-11-21 RX ORDER — DIPHENHYDRAMINE HYDROCHLORIDE 50 MG/ML
50 INJECTION INTRAMUSCULAR; INTRAVENOUS AS NEEDED
Status: CANCELLED | OUTPATIENT
Start: 2024-11-21

## 2024-11-21 RX ORDER — FAMOTIDINE 10 MG/ML
20 INJECTION, SOLUTION INTRAVENOUS ONCE
Status: CANCELLED | OUTPATIENT
Start: 2024-11-21

## 2024-11-21 RX ORDER — SODIUM CHLORIDE 0.9 % (FLUSH) 0.9 %
10 SYRINGE (ML) INJECTION AS NEEDED
OUTPATIENT
Start: 2024-11-21

## 2024-11-21 RX ORDER — DIPHENHYDRAMINE HYDROCHLORIDE 50 MG/ML
25 INJECTION INTRAMUSCULAR; INTRAVENOUS ONCE
Status: COMPLETED | OUTPATIENT
Start: 2024-11-21 | End: 2024-11-21

## 2024-11-21 RX ORDER — FAMOTIDINE 10 MG/ML
20 INJECTION, SOLUTION INTRAVENOUS AS NEEDED
Status: CANCELLED | OUTPATIENT
Start: 2024-11-21

## 2024-11-21 RX ORDER — HEPARIN SODIUM (PORCINE) LOCK FLUSH IV SOLN 100 UNIT/ML 100 UNIT/ML
500 SOLUTION INTRAVENOUS AS NEEDED
Status: DISCONTINUED | OUTPATIENT
Start: 2024-11-21 | End: 2024-11-21 | Stop reason: HOSPADM

## 2024-11-21 RX ORDER — HEPARIN SODIUM (PORCINE) LOCK FLUSH IV SOLN 100 UNIT/ML 100 UNIT/ML
500 SOLUTION INTRAVENOUS AS NEEDED
OUTPATIENT
Start: 2024-11-21

## 2024-11-21 RX ADMIN — Medication 500 UNITS: at 11:50

## 2024-11-21 RX ADMIN — SODIUM CHLORIDE 20 ML/HR: 9 INJECTION, SOLUTION INTRAVENOUS at 09:37

## 2024-11-21 RX ADMIN — FAMOTIDINE 20 MG: 10 INJECTION INTRAVENOUS at 09:38

## 2024-11-21 RX ADMIN — DEXAMETHASONE SODIUM PHOSPHATE 12 MG: 10 INJECTION, SOLUTION INTRAMUSCULAR; INTRAVENOUS at 10:02

## 2024-11-21 RX ADMIN — DIPHENHYDRAMINE HYDROCHLORIDE 25 MG: 50 INJECTION INTRAMUSCULAR; INTRAVENOUS at 10:00

## 2024-11-21 RX ADMIN — PACLITAXEL 140 MG: 6 INJECTION, SOLUTION INTRAVENOUS at 10:44

## 2024-11-21 RX ADMIN — ONDANSETRON 8 MG: 2 INJECTION INTRAMUSCULAR; INTRAVENOUS at 09:44

## 2024-11-21 RX ADMIN — Medication 10 ML: at 11:50

## 2024-11-21 NOTE — PROGRESS NOTES
Subjective   Sd Man is a 59 y.o. female.  Referred by Dr. Mccain for left breast invasive ductal carcinoma    History of Present Illness     Patient is a 59-year-old postmenopausal lady who presented with a palpable abnormality of the left breast She initially felt and March 2024.  Patient feels like the mass has increased in size since she initially palpated it.  She brought this to the attention of her gynecologist who ordered diagnostic imaging and further workup.    Patient denies any previous abnormal mammograms or breast biopsies.    She has a diagnosis of PCOS and possible diabetes.  Her last normal mammogram prior to the most recent one was in 2015.    Patient reports extremely stressful situation at home with 3 of her daughters having had mental health issues and her ex- with bipolar disorder.  She has been dealing with a lot of stress since 2017.  She currently works as a .  She was working at [employer] in the past but no longer there.    Patient currently sees [facility] and on medications for anxiety and depression.  She also sees a counselor on a regular basis and has an appointment coming up with them.    Her support system is mainly her parents who live in [place] and also she has a few friends here.  During the summer if she were to do chemotherapy she prefers to get that done in [place] and subsequently transferred care back to us.    4/9/2024-bilateral diagnostic mammogram  Breast that heterogeneously dense.  Finding 1 high density irregular mass measuring 20 mm with indistinct margins at 2:00 in the left breast, 11 cm from the nipple.  This indicates the palpable lump in the left breast.  No suspicious abnormalities of the right breast.    Left breast ultrasound  Finding 1.irregular solid mass measuring 20 x 17 x 20 mm in the posterior one third left breast, 2:00, 11 cm from the nipple.  Finding 2.lymph node measures 20 x 9 x 13 mm on the left  breast.  4 mm cortical thickness noted.    Impression  Finding 1.solid mass in the left breast is suspicious, ultrasound-guided biopsy recommended  Finding 2.lymph node in the left breast is suspicious, FNA and possible spring-loaded or limited core biopsy is recommended.    4/16/2024-left breast ultrasound-guided biopsy  1.left breast 2:00-invasive ductal carcinoma  Grade 3  Invasive carcinoma involves multiple tissue cores and measures up to 14 mm  ER +91 to 100% strong  IL +31 to 40% moderate  HER2 negative, 1+  Ki-67 95%    2.lymph node of the left axilla with free-floating fragments of carcinoma, suspicious for metastasis.    5/10/2024-bilateral breast MRI  Biopsy-proven malignancy in the left breast measuring 2.7 cm at 3:00.  No other suspicious findings are seen.  Left axilla lymph node has undergone prior biopsy and shows diffuse cortical thickening suspicious for left axilla lymphadenopathy.  No suspicious abnormalities of the right breast.    5/23/2024-CT of the chest abdomen and pelvis  1.bone island in the right iliac wing, subchondral cyst in the left acetabulum.  2.2 0.4 cm rounded density in the lateral aspect of the left breast with a surgical marker.  2.4 cm left axillary lymph node.  3.12 millimeters subcapsular cyst in the left lobe of the liver, attenuation compatible with serous fluid.  Second likely cyst within the right lobe of the liver measuring 15 mm.  Attenuation compatible with slightly complex fluid.  No grossly suspicious liver lesion identified.  Further imaging of the liver with an ultrasound recommended.  4.no clear evidence of metastatic disease.    5/23/2024-bone scan without any evidence of metastatic disease    Patient presents today to discuss neoadjuvant chemotherapy given the high-grade and high Ki-67 of the tumor although the tumor is strongly ER positive.    She is extremely reluctant to the idea of receiving chemotherapy as she is concerned about feeling sick and she wants  to work for at least 1 year.    Oncotype DX recurrence score on the core biopsy returned at 73 with a greater than 32% risk of distant metastasis with AI or tamoxifen alone.  Significant benefit from chemotherapy noted.    Patient had moved to [place] after the initial diagnosis and initiated neoadjuvant chemotherapy with dose dense Adriamycin and Cytoxan under the care of  at [facility] oncology in [place], [place].  She completed the 4 cycles of dose dense Adriamycin and Cytoxan and currently receiving weekly Taxol.  Chemotherapy initiated on 7/12/2024.  Patient has not had any chemotherapy dose delays or interruptions.    Interval history  Sd returns today for follow-up.  She is scheduled for the final cycle of Taxol.  She is reporting neuropathy at the bottom of her feet.  She also injured her left small toe when she tripped on a step.  She is scheduled to undergo surgery on 12/19/2024.        The following portions of the patient's history were reviewed and updated as appropriate: allergies, current medications, past family history, past medical history, past social history, past surgical history, and problem list.    Past Medical History:   Diagnosis Date    ADHD     Anxiety     Breast cancer     Depression     Hypoxemia associated with sleep     Insulin resistance     Metabolic syndrome X     Obesity 11/11/2012    CHELO on CPAP 06/17/2024    Home sleep study.  Weight 242 pounds.  Moderate CHELO with AHI 19.4 events per hour.  Low oxygen saturation 66% and sleep-related hypoxia abnormal for 43.4 minutes.    PCOS (polycystic ovarian syndrome)     PTSD (post-traumatic stress disorder)         Past Surgical History:   Procedure Laterality Date    OOPHORECTOMY Bilateral 01/01/2014    VENOUS ACCESS DEVICE (PORT) INSERTION Right 6/20/2024    Procedure: INSERTION VENOUS ACCESS DEVICE;  Surgeon: Moira Mccain MD;  Location: [facility];  Service: General;  Laterality: Right;        Family History  "  Problem Relation Age of Onset    Melanoma Mother 75        alive currently 79    Atrial fibrillation Father     Hypertension Father     Malig Hyperthermia Neg Hx         Social History     Socioeconomic History    Marital status:    Tobacco Use    Smoking status: Never     Passive exposure: Never    Smokeless tobacco: Never   Vaping Use    Vaping status: Never Used   Substance and Sexual Activity    Alcohol use: Never    Drug use: Never    Sexual activity: Not Currently        OB History    No obstetric history on file.      Age at menarche-14  Age at first live childbirth-32   3 para 3  2  Age at menopause-47  Breast-feeding for 2 to 3 years  Oral contraceptive pill use none  Hormone replacement therapy for 2 years    No Known Allergies       Objective   Blood pressure 119/81, pulse 73, temperature 98.2 °F (36.8 °C), temperature source Oral, resp. rate 16, height 162 cm (63.78\"), weight 114 kg (251 lb 4.8 oz), SpO2 97%.     Physical Exam  Vitals reviewed.   Constitutional:       Appearance: Normal appearance. She is obese.   HENT:      Nose: Nose normal.      Mouth/Throat:      Pharynx: Oropharynx is clear.   Eyes:      Conjunctiva/sclera: Conjunctivae normal.   Cardiovascular:      Rate and Rhythm: Normal rate and regular rhythm.   Pulmonary:      Effort: Pulmonary effort is normal.   Abdominal:      General: Abdomen is flat.   Musculoskeletal:         General: Normal range of motion.      Cervical back: Normal range of motion.   Skin:     General: Skin is warm and dry.   Neurological:      General: No focal deficit present.      Mental Status: She is alert and oriented to person, place, and time.   Psychiatric:         Mood and Affect: Mood normal.         Behavior: Behavior normal.         Thought Content: Thought content normal.         Judgment: Judgment normal.         Breast exam:  Right breast: Appears normal on inspection.  No palpable abnormalities of the right breast.  Left " breast: On inspection no significant abnormalities other than biopsy site changes.  The left axillary mass at 2:00 as well as the left axillary lymphadenopathy is no longer palpable.  No cervical or supraclavicular lymphadenopathy.    I have reexamined the patient and the results are consistent with the previously documented exam. Kristan Chawla MD      Labs:  Results from last 7 days   Lab Units 11/21/24  0835 11/14/24  0917   WBC 10*3/mm3 5.94 6.90   NEUTROS ABS 10*3/mm3 3.28 4.66   HEMOGLOBIN g/dL 11.2* 11.0*   HEMATOCRIT % 33.0* 32.2*   PLATELETS 10*3/mm3 365 336       Results from last 7 days   Lab Units 11/21/24  0835 11/14/24  0917   SODIUM mmol/L 139 140   POTASSIUM mmol/L 4.1 4.2   CHLORIDE mmol/L 104 104   CO2 mmol/L 22.1 25.6   BUN mg/dL 16 13   CREATININE mg/dL 0.72 0.70   CALCIUM mg/dL 8.7 8.8   ALBUMIN g/dL 3.9 3.9   BILIRUBIN mg/dL 0.4 0.5   ALK PHOS U/L 98 94   ALT (SGPT) U/L 46* 37*   AST (SGOT) U/L 33* 26   GLUCOSE mg/dL 153* 174*             No radiology results for the last 30 days.     MRI of the breast images independently reviewed interpreted by me in detail summarized above  CT chest abdomen pelvis and bone scan images independently reviewed and interpreted by me in detail summarized above    6/4/2024-ultrasound of the liver-echogenic liver consistent with fatty infiltration  1.6 cm benign-appearing right hepatic lobe cyst.    Reviewed ultrasound images independently and interpreted them independently.    Assessment & Plan   *Left breast invasive ductal carcinoma  Clinical T2 N1a M0, Anatomic stage IIb, prognostic stage IIa.  The tumor is grade 3, Ki-67 95%, ER +91 to 100% strong, VT +31 to 40% moderate.  Oncotype DX recurrence score of the tumor noted to be 73 with a greater than 32% risk of distant metastasis with AI or tamoxifen alone.  Significant benefit from chemotherapy.  Initiated dose dense Adriamycin and Cytoxan on 7/12/2024 at [facility] under the care of   Shailesh  Noted to have a significant response in the breast after initiation of neoadjuvant chemotherapy.  11/21/2024-week 12 of Taxol.  Taxol dose will be reduced by 20% due to neuropathy in the bottom of her feet.  12/19/2024-scheduled for left breast surgery.  MD follow-up after surgery.    *Anemia  Hemoglobin has decreased to 9.9, secondary to chemotherapy  Continue to monitor  Patient reports fatigue as a result of anemia  Hemoglobin 11.2    *Severe anxiety and suicidal thoughts  She is a current patient of [facility].  She sees a psychiatric nurse practitioner as well as a counselor and has an appointment with them.  Currently on Trintellix 10 mg.  Severe stressors as reported in the HPI.  11/7/2024: Reports increased stress and anxiety related to the recent election.  11/21/2024-mood improved    *Obesity  Class III  BMI 43.4  Patient plans to see her new primary care physician soon    *Elevated Liver enzymes  LFTs stable with the ALT of 46, AST 33    *Possible diabetes-she will require follow-up with her primary care physician for management of the same.  Has an appointment with new PCP.    *Cardiac health-she has seen Dr. Syed and echocardiogram has been obtained which shows an ejection fraction of 65% with a GLS of -19.6%    *Cough and tachycardia  Treated with azithromycin  Heart rate has improved  She still has some ongoing cough    *Neuropathy  Secondary to Taxol  Dose will be reduced  Start B complex    Plan:   Dose reduce Taxol by 20% due to neuropathy  MD on 12/2/2025 to discuss pathology and further recommendations on treatment    High risk medication requiring close monitoring for toxicity.  Decrease dose of Taxol by 20% due to neuropathy.

## 2024-11-21 NOTE — NURSING NOTE
Pt arrived for C12 Taxol after being seen by Dr. Chawla. Per Dr. Chawla, dose reduction of 20% due to neuropathy. Pt tolerated infusion without complications and was discharged in a stable condition.

## 2024-11-22 ENCOUNTER — HOSPITAL ENCOUNTER (OUTPATIENT)
Dept: CARDIOLOGY | Facility: HOSPITAL | Age: 59
Discharge: HOME | End: 2024-11-22
Admitting: INTERNAL MEDICINE
Payer: COMMERCIAL

## 2024-11-22 VITALS
WEIGHT: 251 LBS | SYSTOLIC BLOOD PRESSURE: 122 MMHG | HEIGHT: 63 IN | HEART RATE: 97 BPM | DIASTOLIC BLOOD PRESSURE: 80 MMHG | BODY MASS INDEX: 44.47 KG/M2

## 2024-11-22 DIAGNOSIS — R05.1 ACUTE COUGH: ICD-10-CM

## 2024-11-22 DIAGNOSIS — R06.09 DYSPNEA ON EXERTION: ICD-10-CM

## 2024-11-22 DIAGNOSIS — Z51.11 ENCOUNTER FOR ANTINEOPLASTIC CHEMOTHERAPY: ICD-10-CM

## 2024-11-22 LAB
AORTIC ARCH: 3.1 CM
AORTIC DIMENSIONLESS INDEX: 0.82 (DI)
ASCENDING AORTA: 3.4 CM
BH CV ECHO LEFT VENTRICLE GLOBAL LONGITUDINAL STRAIN: -21.5 %
BH CV ECHO MEAS - ACS: 2.31 CM
BH CV ECHO MEAS - AO MAX PG: 4.6 MMHG
BH CV ECHO MEAS - AO MEAN PG: 3 MMHG
BH CV ECHO MEAS - AO ROOT AREA (BSA CORRECTED): 1.5 CM2
BH CV ECHO MEAS - AO ROOT DIAM: 3.2 CM
BH CV ECHO MEAS - AO V2 MAX: 107 CM/SEC
BH CV ECHO MEAS - AO V2 VTI: 20.7 CM
BH CV ECHO MEAS - AVA(I,D): 2.9 CM2
BH CV ECHO MEAS - EDV(CUBED): 62.8 ML
BH CV ECHO MEAS - EDV(MOD-SP2): 107 ML
BH CV ECHO MEAS - EDV(MOD-SP4): 102 ML
BH CV ECHO MEAS - EF(MOD-BP): 56.5 %
BH CV ECHO MEAS - EF(MOD-SP2): 56.1 %
BH CV ECHO MEAS - EF(MOD-SP4): 56.9 %
BH CV ECHO MEAS - EF_3D-VOL: 56 %
BH CV ECHO MEAS - ESV(CUBED): 23.9 ML
BH CV ECHO MEAS - ESV(MOD-SP2): 47 ML
BH CV ECHO MEAS - ESV(MOD-SP4): 44 ML
BH CV ECHO MEAS - FS: 27.5 %
BH CV ECHO MEAS - IVS/LVPW: 1.1 CM
BH CV ECHO MEAS - IVSD: 1.36 CM
BH CV ECHO MEAS - LA 3D VOL INDEX: 24
BH CV ECHO MEAS - LAT PEAK E' VEL: 7.7 CM/SEC
BH CV ECHO MEAS - LV MASS(C)D: 184 GRAMS
BH CV ECHO MEAS - LV MAX PG: 3.1 MMHG
BH CV ECHO MEAS - LV MEAN PG: 2 MMHG
BH CV ECHO MEAS - LV V1 MAX: 88.3 CM/SEC
BH CV ECHO MEAS - LV V1 VTI: 17 CM
BH CV ECHO MEAS - LVIDD: 4 CM
BH CV ECHO MEAS - LVIDS: 2.9 CM
BH CV ECHO MEAS - LVOT AREA: 3.5 CM2
BH CV ECHO MEAS - LVOT DIAM: 2.12 CM
BH CV ECHO MEAS - LVPWD: 1.23 CM
BH CV ECHO MEAS - MED PEAK E' VEL: 4.8 CM/SEC
BH CV ECHO MEAS - MV A DUR: 0.1 SEC
BH CV ECHO MEAS - MV A MAX VEL: 64.9 CM/SEC
BH CV ECHO MEAS - MV DEC SLOPE: 293.2 CM/SEC2
BH CV ECHO MEAS - MV DEC TIME: 0.14 SEC
BH CV ECHO MEAS - MV E MAX VEL: 49.4 CM/SEC
BH CV ECHO MEAS - MV E/A: 0.76
BH CV ECHO MEAS - MV MAX PG: 3.2 MMHG
BH CV ECHO MEAS - MV MEAN PG: 1.23 MMHG
BH CV ECHO MEAS - MV P1/2T: 57.6 MSEC
BH CV ECHO MEAS - MV V2 VTI: 18.9 CM
BH CV ECHO MEAS - MVA(P1/2T): 3.8 CM2
BH CV ECHO MEAS - MVA(VTI): 3.2 CM2
BH CV ECHO MEAS - PA ACC TIME: 0.09 SEC
BH CV ECHO MEAS - PA V2 MAX: 82.8 CM/SEC
BH CV ECHO MEAS - PULM A REVS DUR: 0.09 SEC
BH CV ECHO MEAS - PULM A REVS VEL: 36.7 CM/SEC
BH CV ECHO MEAS - PULM DIAS VEL: 35.2 CM/SEC
BH CV ECHO MEAS - PULM S/D: 1.45
BH CV ECHO MEAS - PULM SYS VEL: 50.9 CM/SEC
BH CV ECHO MEAS - QP/QS: 1.16
BH CV ECHO MEAS - RAP SYSTOLE: 3 MMHG
BH CV ECHO MEAS - RV MAX PG: 1.98 MMHG
BH CV ECHO MEAS - RV V1 MAX: 70.3 CM/SEC
BH CV ECHO MEAS - RV V1 VTI: 13.7 CM
BH CV ECHO MEAS - RVOT DIAM: 2.5 CM
BH CV ECHO MEAS - RVSP: 26.4 MMHG
BH CV ECHO MEAS - SV(LVOT): 60.3 ML
BH CV ECHO MEAS - SV(MOD-SP2): 60 ML
BH CV ECHO MEAS - SV(MOD-SP4): 58 ML
BH CV ECHO MEAS - SV(RVOT): 69.6 ML
BH CV ECHO MEAS - TAPSE (>1.6): 2.6 CM
BH CV ECHO MEAS - TR MAX PG: 23.4 MMHG
BH CV ECHO MEAS - TR MAX VEL: 241.6 CM/SEC
BH CV ECHO MEASUREMENTS AVERAGE E/E' RATIO: 7.9
BH CV STRESS BP STAGE 1: NORMAL
BH CV STRESS DURATION MIN STAGE 1: 3
BH CV STRESS DURATION MIN STAGE 2: 1
BH CV STRESS DURATION SEC STAGE 1: 0
BH CV STRESS DURATION SEC STAGE 2: 0
BH CV STRESS ECHO POST STRESS EJECTION FRACTION EF: 74 %
BH CV STRESS GRADE STAGE 1: 10
BH CV STRESS GRADE STAGE 2: 12
BH CV STRESS HR STAGE 1: 156
BH CV STRESS HR STAGE 2: 164
BH CV STRESS METS STAGE 1: 5
BH CV STRESS METS STAGE 2: 5
BH CV STRESS PROTOCOL 1: NORMAL
BH CV STRESS SPEED STAGE 1: 1.7
BH CV STRESS SPEED STAGE 2: 2.5
BH CV STRESS STAGE 1: 1
BH CV STRESS STAGE 2: 2
BH CV XLRA - RV BASE: 3.4 CM
BH CV XLRA - RV LENGTH: 7 CM
BH CV XLRA - RV MID: 3.1 CM
BH CV XLRA - TDI S': 11.2 CM/SEC
LEFT ATRIUM VOLUME INDEX: 17 ML/M2
MAXIMAL PREDICTED HEART RATE: 163 BPM
PERCENT MAX PREDICTED HR: 100.61 %
SINUS: 3.1 CM
STJ: 3.1 CM
STRESS BASELINE BP: NORMAL MMHG
STRESS BASELINE HR: 97 BPM
STRESS PERCENT HR: 118 %
STRESS POST ESTIMATED WORKLOAD: 5 METS
STRESS POST EXERCISE DUR MIN: 4 MIN
STRESS POST EXERCISE DUR SEC: 0 SEC
STRESS POST PEAK BP: NORMAL MMHG
STRESS POST PEAK HR: 164 BPM
STRESS TARGET HR: 139 BPM

## 2024-11-22 PROCEDURE — 93350 STRESS TTE ONLY: CPT

## 2024-11-22 PROCEDURE — 25510000001 PERFLUTREN 6.52 MG/ML SUSPENSION 2 ML VIAL: Performed by: INTERNAL MEDICINE

## 2024-11-22 PROCEDURE — 93320 DOPPLER ECHO COMPLETE: CPT

## 2024-11-22 PROCEDURE — 93325 DOPPLER ECHO COLOR FLOW MAPG: CPT

## 2024-11-22 PROCEDURE — 93017 CV STRESS TEST TRACING ONLY: CPT

## 2024-11-22 PROCEDURE — 93356 MYOCRD STRAIN IMG SPCKL TRCK: CPT

## 2024-11-22 RX ADMIN — PERFLUTREN 3 ML: 6.52 INJECTION, SUSPENSION INTRAVENOUS at 14:41

## 2024-11-25 ENCOUNTER — TELEPHONE (OUTPATIENT)
Dept: CARDIOLOGY | Facility: CLINIC | Age: 59
End: 2024-11-25
Payer: COMMERCIAL

## 2024-11-25 NOTE — TELEPHONE ENCOUNTER
Left voicemail for Sd Man requesting callback.    HUB: please transfer to Triage if patient returns call    Thank you,  Doreen GILLILAND RN  Triage Nurse [facility]  11/25/24   09:27 EST

## 2024-11-25 NOTE — TELEPHONE ENCOUNTER
Please let her know that stress echo looks good.  Please let her know that echo looks good.  Her heart is strong and functioning well.  There was no evidence of tightly blocked coronary arteries on the study, however she did have moderately impaired exercise capacity.  Would recommend she start a low-level exercise program and increase activity as tolerated.  Continue current medications and keep currently scheduled follow-up appointment.

## 2024-11-26 NOTE — TELEPHONE ENCOUNTER
Notified pt of results and recommendations. Pt verbalized understanding.    Thank you,    Coco Guevara, RN  T[place] [facility]  11/26/24 09:38 EST

## 2024-12-02 ENCOUNTER — TELEPHONE (OUTPATIENT)
Dept: ONCOLOGY | Facility: CLINIC | Age: 59
End: 2024-12-02
Payer: COMMERCIAL

## 2024-12-02 DIAGNOSIS — S92.902A CLOSED FRACTURE OF LEFT FOOT, INITIAL ENCOUNTER: Primary | ICD-10-CM

## 2024-12-02 NOTE — TELEPHONE ENCOUNTER
Have attempted to reach Sd several times and left message to return my call.  TherapeuticsMDhart message sent.  Orthopedic referral entered.

## 2024-12-05 ENCOUNTER — OFFICE VISIT (OUTPATIENT)
Dept: ORTHOPEDIC SURGERY | Facility: CLINIC | Age: 59
End: 2024-12-05
Payer: COMMERCIAL

## 2024-12-05 VITALS — BODY MASS INDEX: 44.65 KG/M2 | WEIGHT: 252 LBS | HEIGHT: 63 IN | TEMPERATURE: 96.9 F

## 2024-12-05 DIAGNOSIS — S92.515A CLOSED NONDISPLACED FRACTURE OF PROXIMAL PHALANX OF LESSER TOE OF LEFT FOOT, INITIAL ENCOUNTER: ICD-10-CM

## 2024-12-05 DIAGNOSIS — R52 PAIN: Primary | ICD-10-CM

## 2024-12-05 DIAGNOSIS — M19.072 ARTHRITIS OF FIRST METATARSOPHALANGEAL (MTP) JOINT OF LEFT FOOT: ICD-10-CM

## 2024-12-05 DIAGNOSIS — M92.62 HAGLUND'S DEFORMITY OF LEFT HEEL: ICD-10-CM

## 2024-12-05 DIAGNOSIS — M20.12 HALLUX VALGUS OF LEFT FOOT: ICD-10-CM

## 2024-12-05 DIAGNOSIS — M65.28 CALCIFIC ACHILLES TENDONITIS: ICD-10-CM

## 2024-12-05 PROBLEM — M76.60 CALCIFIC ACHILLES TENDONITIS: Status: ACTIVE | Noted: 2024-12-05

## 2024-12-05 RX ORDER — METFORMIN HYDROCHLORIDE 500 MG/1
500 TABLET, EXTENDED RELEASE ORAL DAILY
COMMUNITY
Start: 2024-11-27

## 2024-12-05 NOTE — PROGRESS NOTES
New Patient Complaint      Patient: Sd Man  YOB: 1965 59 y.o. female  Medical Record Number: [id number]    Chief Complaints: I hurt my toe    History of Present Illness:   Patient injured her left fifth toe on 11/21/2024 when she was carrying an empty suitcase down some steps missed a step and caught her fifth toe.  She was seen in urgent care and found to have a left fifth proximal phalanx fracture    Patient reports mild pain in the left fifth toe but no other pain in the foot or ankle.  She has been using crocs as these are the only shoes that are comfortable for her.    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       HPI    Allergies: No Known Allergies    Medications:   Current Outpatient Medications on File Prior to Visit   Medication Sig    amphetamine-dextroamphetamine (ADDERALL) 12.5 MG tablet Take 1 tablet by mouth Every Night. 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 Takes occasionally)    azSTARys 39.2-7.8 MG capsule Take 1 tablet by mouth Daily. PT TO HOLD 48 HOURS PRIOR TO SURGERY    Cholecalciferol (D3 VITAMIN PO) Take  by mouth.    lidocaine-prilocaine (EMLA) 2.5-2.5 % cream APPLY TOPICALLY AS NEEDED FOR MILD PAIN. APPLY TO PORT SITE AS NEEDED FOR PAIN/DISCOMFORT    metFORMIN ER (GLUCOPHAGE-XR) 500 MG 24 hr tablet Take 1 tablet by mouth Daily.    ondansetron (ZOFRAN) 8 MG tablet Take 1 tablet by mouth Every 8 (Eight) Hours As Needed for Nausea or Vomiting.    Vortioxetine HBr (TRINTELLIX) 20 MG tablet Take 1 tablet by mouth Daily.    azithromycin (Zithromax Z-Ang) 250 MG tablet Take 2 tablets by mouth on day 1, then 1 tablet daily on days 2-5 (Patient not taking: Reported on 12/5/2024)    OLANZapine (zyPREXA) 5 MG tablet Take 1 tablet by mouth Daily. (Patient not taking: Reported on 12/5/2024)     No  "current facility-administered medications on file prior to visit.       Past Medical History:   Diagnosis Date    ADHD     Anxiety     Breast cancer     Depression     Hypoxemia associated with sleep     Insulin resistance     Metabolic syndrome X     Obesity 11/11/2012    CHELO on CPAP 06/17/2024    Home sleep study.  Weight 242 pounds.  Moderate CHELO with AHI 19.4 events per hour.  Low oxygen saturation 66% and sleep-related hypoxia abnormal for 43.4 minutes.    PCOS (polycystic ovarian syndrome)     PTSD (post-traumatic stress disorder)      Past Surgical History:   Procedure Laterality Date    OOPHORECTOMY Bilateral 01/01/2014    VENOUS ACCESS DEVICE (PORT) INSERTION Right 6/20/2024    Procedure: INSERTION VENOUS ACCESS DEVICE;  Surgeon: Moira Mccain MD;  Location: [facility];  Service: General;  Laterality: Right;     Social History     Occupational History    Not on file   Tobacco Use    Smoking status: Never     Passive exposure: Never    Smokeless tobacco: Never   Vaping Use    Vaping status: Never Used   Substance and Sexual Activity    Alcohol use: Never    Drug use: Never    Sexual activity: Not Currently      Social History     Social History Narrative    Not on file     Family History   Problem Relation Age of Onset    Melanoma Mother 75        alive currently 79    Atrial fibrillation Father     Hypertension Father     Malig Hyperthermia Neg Hx        Review of Systems: 14 point review of systems performed, positive pertinent findings identified in HPI. All remaining systems negative     Review of Systems      Physical Exam:   Vitals:    12/05/24 0851   Temp: 96.9 °F (36.1 °C)   Weight: 114 kg (252 lb)   Height: 160 cm (63\")   PainSc:   2     Physical Exam   Constitutional: pleasant, well developed   Eyes: sclera non icteric  Hearing : adequate for exam  Cardiovascular: palpable pulses in left foot, left calf/ thigh NT without sign of DVT  Respiratoy: breathing unlabored   Neurological: grossly " sensate to LT throughout left LE  Psychiatric: oriented with normal mood and affect.   Lymphatic: No palpable popliteal lymphadenopathy left LE  Skin: intact throughout left leg/foot  Musculoskeletal: There is mild swelling of the left fifth toe with mild tenderness palpation along the proximal phalanx.  There was no clinical deformity.  She was nontender over the dorsum of the midfoot and had neutral dorsiflexion of the heel cord.  Physical Exam  Ortho Exam    Radiology: 3 views left foot ordered evaluate fracture reviewed and compared to x-rays in the [facility] system from 11/21/2024.  Today's x-rays show nondisplaced oblique fracture of the distal aspect of the fifth proximal phalanx.  There is no intra-articular incongruity or appreciable angulation.  There is mild hallux valgus with some mild arthritis of the first MTP joint.  No obvious malalignment to the midfoot.  There is plantar calcaneal spurring as well as calcifications at the insertion of the Achilles and prominent superior calcaneal tuberosity.    Assessment/Plan: 1.  Left fifth toe proximal phalanx fracture  2.  Left hallux valgus with mild first MTP arthritis   3.  Left Achilles insertional calcifications with Jennifer deformity    We discussed treatment going forward and nothing I would recommend from a surgical standpoint for the fifth toe fracture.    She was fitted with a silicone spacer to use tween the fourth and fifth toes and instructed on elizabeth wrapping.  She was provided with a postoperative shoe to use for at least the next 2 to 3 weeks and if comfortable with this or getting back into crocs.    She was counseled avoid anti-inflammatories such as ibuprofen Aleve etc. so as to minimize bone healing intubation.  She reports she also cannot use Tylenol due to some liver issues.    She is having surgery for breast cancer on 12/19/2024.    Will see her back in 4 to 6 weeks with x-rays of her left foot.

## 2024-12-09 ENCOUNTER — PATIENT OUTREACH (OUTPATIENT)
Dept: OTHER | Facility: HOSPITAL | Age: 59
End: 2024-12-09
Payer: COMMERCIAL

## 2024-12-11 ENCOUNTER — OFFICE VISIT (OUTPATIENT)
Dept: SLEEP MEDICINE | Facility: HOSPITAL | Age: 59
End: 2024-12-11
Payer: COMMERCIAL

## 2024-12-11 VITALS — WEIGHT: 249 LBS | BODY MASS INDEX: 42.51 KG/M2 | HEART RATE: 110 BPM | OXYGEN SATURATION: 96 % | HEIGHT: 64 IN

## 2024-12-11 DIAGNOSIS — G47.36 HYPOXEMIA ASSOCIATED WITH SLEEP: ICD-10-CM

## 2024-12-11 DIAGNOSIS — E66.813 CLASS 3 SEVERE OBESITY DUE TO EXCESS CALORIES WITH SERIOUS COMORBIDITY AND BODY MASS INDEX (BMI) OF 40.0 TO 44.9 IN ADULT: ICD-10-CM

## 2024-12-11 DIAGNOSIS — G47.33 OSA ON CPAP: Primary | ICD-10-CM

## 2024-12-11 DIAGNOSIS — E66.01 CLASS 3 SEVERE OBESITY DUE TO EXCESS CALORIES WITH SERIOUS COMORBIDITY AND BODY MASS INDEX (BMI) OF 40.0 TO 44.9 IN ADULT: ICD-10-CM

## 2024-12-11 PROCEDURE — G0463 HOSPITAL OUTPT CLINIC VISIT: HCPCS

## 2024-12-11 PROCEDURE — 99213 OFFICE O/P EST LOW 20 MIN: CPT | Performed by: INTERNAL MEDICINE

## 2024-12-11 NOTE — PROGRESS NOTES
"Follow Up Sleep Disorders Center Note     Chief Complaint:  CHELO     Primary Care Physician: Moira Tejada MD    Interval History:   The patient is a 59 y.o. female who I last saw 9/26/2024 and that note was reviewed. Home sleep study performed 6/17/2024. Moderate severity obstructive sleep apnea with AHI 19.4 events per hour noted. Low oxygen saturation 66% and sleep-related hypoxia abnormal for 43.4 minutes.     When I last saw her, downloads between 7/10 and 9/23/2024 demonstrated 49 days of usage out of 76 days.  That equals 64% compliance.  Average usage 6 hours and 3 minutes.  Between 8/4 and 9/4/2024, the patient demonstrated usage 22 out of 30 days for 73% compliance.    Due to getting chemotherapy, the patient did go to [place] to be with family.    The patient reports her compliance is doing better.  However, she seems to be taking her mask off at night while asleep.  She goes to bed 11 PM gets out of bed at 7 AM.    Review of Systems:    A complete review of systems was done and all were negative with the exception of occasional cough    Social History:    Social History     Socioeconomic History    Marital status:    Tobacco Use    Smoking status: Never     Passive exposure: Never    Smokeless tobacco: Never   Vaping Use    Vaping status: Never Used   Substance and Sexual Activity    Alcohol use: Never    Drug use: Never    Sexual activity: Not Currently       Allergies:  Patient has no known allergies.     Medication Review: Her list was reviewed.      Vital Signs:    Vitals:    12/11/24 0959   Pulse: 110   SpO2: 96%   Weight: 113 kg (249 lb)   Height: 162.6 cm (64\")     Body mass index is 42.74 kg/m².    Physical Exam:    Constitutional:  Well developed 59 y.o. female that appears in no apparent distress.  Awake & oriented times 3.  Normal mood with normal recent and remote memory and normal judgement.  Eyes:  Conjunctivae normal.  Oropharynx: Previously, moist mucous membranes without " exudate and a large tongue and class III Mallampati airway.    Self-administered Chester Sleepiness Scale test results: 4  0-5 Lower normal daytime sleepiness  6-10 Higher normal daytime sleepiness  11-12 Mild, 13-15 Moderate, & 16-24 Severe excessive daytime sleepiness     Downloaded PAP Data Evaluated For Therapeutic Response and Compliance:  DME is [facility] and the patient uses a nasal pillow.  Downloads between 9/26 and 12/9/2024 compliance 84%.  Average usage is 6 hours and 21 minutes.  Average AHI is normal with a borderline leak.  Average auto CPAP pressure is 13.4 and her ResMed auto CPAP is 8-20    I have reviewed the above results and compared them with the patient's last downloads and reviewed with the patient.    Impression:   Moderate severity obstructive sleep apnea with sleep-related hypoxia by home sleep study 6/17/2024, weight 242 pounds, adequately treated with ResMed auto CPAP.  Downloads demonstrate the patient to be at goal with compliance and she demonstrates good usage.  The patient has some persistent complaints of hypersomnolence but her Chester Sleepiness Scale has improved from 11 down to 9 down to 4.     Plan:  Good sleep hygiene measures should be maintained.  Weight loss would be beneficial in this patient who has class III severe obesity by Body mass index is 42.74 kg/m².      After evaluating the patient and assessing results available, the patient is benefiting from the treatment being provided.     The patient will continue ResMed auto CPAP.  Potential side effects of not using PAP therapy reviewed and addressed as needed.  After clinical evaluation and review of downloads, I recommend no changes to the patient's pressures.  A new prescription will be sent to the patient's DME as needed.    I answered all of the patient's questions.  The patient will call the Sleep Disorder Center for any problems and will follow up in 6 months.      Sanchez Payton MD  Sleep  Medicine  12/11/24  10:16 EST

## 2024-12-19 ENCOUNTER — ANCILLARY PROCEDURE (OUTPATIENT)
Dept: LAB | Facility: HOSPITAL | Age: 59
End: 2024-12-19
Payer: COMMERCIAL

## 2024-12-19 ENCOUNTER — ANESTHESIA (OUTPATIENT)
Dept: PERIOP | Facility: HOSPITAL | Age: 59
End: 2024-12-19
Payer: COMMERCIAL

## 2024-12-19 ENCOUNTER — APPOINTMENT (OUTPATIENT)
Dept: GENERAL RADIOLOGY | Facility: HOSPITAL | Age: 59
End: 2024-12-19
Payer: COMMERCIAL

## 2024-12-19 ENCOUNTER — TRANSCRIBE ORDERS (OUTPATIENT)
Dept: LAB | Facility: HOSPITAL | Age: 59
End: 2024-12-19
Payer: COMMERCIAL

## 2024-12-19 ENCOUNTER — ANESTHESIA EVENT (OUTPATIENT)
Dept: PERIOP | Facility: HOSPITAL | Age: 59
End: 2024-12-19
Payer: COMMERCIAL

## 2024-12-19 ENCOUNTER — HOSPITAL ENCOUNTER (OUTPATIENT)
Dept: NUCLEAR MEDICINE | Facility: HOSPITAL | Age: 59
Discharge: HOME | End: 2024-12-19
Payer: COMMERCIAL

## 2024-12-19 ENCOUNTER — HOSPITAL ENCOUNTER (OUTPATIENT)
Facility: HOSPITAL | Age: 59
Setting detail: HOSPITAL OUTPATIENT SURGERY
Discharge: HOME | End: 2024-12-19
Attending: STUDENT IN AN ORGANIZED HEALTH CARE EDUCATION/TRAINING PROGRAM | Admitting: STUDENT IN AN ORGANIZED HEALTH CARE EDUCATION/TRAINING PROGRAM
Payer: COMMERCIAL

## 2024-12-19 ENCOUNTER — HOSPITAL ENCOUNTER (OUTPATIENT)
Dept: MAMMOGRAPHY | Facility: HOSPITAL | Age: 59
Discharge: HOME | End: 2024-12-19
Payer: COMMERCIAL

## 2024-12-19 VITALS
SYSTOLIC BLOOD PRESSURE: 114 MMHG | RESPIRATION RATE: 16 BRPM | TEMPERATURE: 98.5 F | DIASTOLIC BLOOD PRESSURE: 75 MMHG | OXYGEN SATURATION: 91 % | HEART RATE: 84 BPM

## 2024-12-19 DIAGNOSIS — C50.412 CARCINOMA OF UPPER-OUTER QUADRANT OF FEMALE BREAST, LEFT: ICD-10-CM

## 2024-12-19 DIAGNOSIS — Z17.0 MALIGNANT NEOPLASM OF UPPER-OUTER QUADRANT OF LEFT BREAST IN FEMALE, ESTROGEN RECEPTOR POSITIVE: ICD-10-CM

## 2024-12-19 DIAGNOSIS — Z17.0 MALIGNANT NEOPLASM OF UPPER-OUTER QUADRANT OF LEFT BREAST IN FEMALE, ESTROGEN RECEPTOR POSITIVE: Primary | ICD-10-CM

## 2024-12-19 DIAGNOSIS — C50.412 MALIGNANT NEOPLASM OF UPPER-OUTER QUADRANT OF LEFT BREAST IN FEMALE, ESTROGEN RECEPTOR POSITIVE: ICD-10-CM

## 2024-12-19 DIAGNOSIS — C50.412 MALIGNANT NEOPLASM OF UPPER-OUTER QUADRANT OF LEFT BREAST IN FEMALE, ESTROGEN RECEPTOR POSITIVE: Primary | ICD-10-CM

## 2024-12-19 DIAGNOSIS — C50.412 CARCINOMA OF UPPER-OUTER QUADRANT OF FEMALE BREAST, LEFT: Primary | ICD-10-CM

## 2024-12-19 LAB — GLUCOSE BLDC GLUCOMTR-MCNC: 124 MG/DL (ref 70–130)

## 2024-12-19 PROCEDURE — 34310000005 TECHETIUM TC99M TILMANOCEPT: Performed by: STUDENT IN AN ORGANIZED HEALTH CARE EDUCATION/TRAINING PROGRAM

## 2024-12-19 PROCEDURE — 38900 IO MAP OF SENT LYMPH NODE: CPT | Performed by: REGISTERED NURSE

## 2024-12-19 PROCEDURE — 78195 LYMPH SYSTEM IMAGING: CPT | Performed by: STUDENT IN AN ORGANIZED HEALTH CARE EDUCATION/TRAINING PROGRAM

## 2024-12-19 PROCEDURE — 19285 PERQ DEV BREAST 1ST US IMAG: CPT | Performed by: STUDENT IN AN ORGANIZED HEALTH CARE EDUCATION/TRAINING PROGRAM

## 2024-12-19 PROCEDURE — 25010000002 FENTANYL CITRATE (PF) 50 MCG/ML SOLUTION: Performed by: STUDENT IN AN ORGANIZED HEALTH CARE EDUCATION/TRAINING PROGRAM

## 2024-12-19 PROCEDURE — C1819 TISSUE LOCALIZATION-EXCISION: HCPCS

## 2024-12-19 PROCEDURE — 25010000002 SUCCINYLCHOLINE PER 20 MG: Performed by: STUDENT IN AN ORGANIZED HEALTH CARE EDUCATION/TRAINING PROGRAM

## 2024-12-19 PROCEDURE — 82948 REAGENT STRIP/BLOOD GLUCOSE: CPT

## 2024-12-19 PROCEDURE — 25010000002 LIDOCAINE 1% - EPINEPHRINE 1:100000 1 %-1:100000 SOLUTION 30 ML VIAL: Performed by: STUDENT IN AN ORGANIZED HEALTH CARE EDUCATION/TRAINING PROGRAM

## 2024-12-19 PROCEDURE — 88307 TISSUE EXAM BY PATHOLOGIST: CPT | Performed by: STUDENT IN AN ORGANIZED HEALTH CARE EDUCATION/TRAINING PROGRAM

## 2024-12-19 PROCEDURE — 25010000002 LIDOCAINE 1 % SOLUTION: Performed by: STUDENT IN AN ORGANIZED HEALTH CARE EDUCATION/TRAINING PROGRAM

## 2024-12-19 PROCEDURE — 25010000002 MAGNESIUM SULFATE PER 500 MG OF MAGNESIUM: Performed by: STUDENT IN AN ORGANIZED HEALTH CARE EDUCATION/TRAINING PROGRAM

## 2024-12-19 PROCEDURE — 38900 IO MAP OF SENT LYMPH NODE: CPT | Performed by: STUDENT IN AN ORGANIZED HEALTH CARE EDUCATION/TRAINING PROGRAM

## 2024-12-19 PROCEDURE — 25010000002 DEXAMETHASONE SODIUM PHOSPHATE 20 MG/5ML SOLUTION: Performed by: STUDENT IN AN ORGANIZED HEALTH CARE EDUCATION/TRAINING PROGRAM

## 2024-12-19 PROCEDURE — 25010000002 SUGAMMADEX 200 MG/2ML SOLUTION: Performed by: STUDENT IN AN ORGANIZED HEALTH CARE EDUCATION/TRAINING PROGRAM

## 2024-12-19 PROCEDURE — 88332 PATH CONSLTJ SURG EA ADD BLK: CPT | Performed by: STUDENT IN AN ORGANIZED HEALTH CARE EDUCATION/TRAINING PROGRAM

## 2024-12-19 PROCEDURE — 25010000002 ISOSULFAN BLUE 1 % SOLUTION: Performed by: STUDENT IN AN ORGANIZED HEALTH CARE EDUCATION/TRAINING PROGRAM

## 2024-12-19 PROCEDURE — 25010000002 BUPIVACAINE (PF) 0.5 % SOLUTION 10 ML VIAL: Performed by: STUDENT IN AN ORGANIZED HEALTH CARE EDUCATION/TRAINING PROGRAM

## 2024-12-19 PROCEDURE — 25010000002 ONDANSETRON PER 1 MG: Performed by: STUDENT IN AN ORGANIZED HEALTH CARE EDUCATION/TRAINING PROGRAM

## 2024-12-19 PROCEDURE — C1819 TISSUE LOCALIZATION-EXCISION: HCPCS | Performed by: STUDENT IN AN ORGANIZED HEALTH CARE EDUCATION/TRAINING PROGRAM

## 2024-12-19 PROCEDURE — 25010000002 PHENYLEPHRINE 10 MG/ML SOLUTION 5 ML VIAL: Performed by: STUDENT IN AN ORGANIZED HEALTH CARE EDUCATION/TRAINING PROGRAM

## 2024-12-19 PROCEDURE — 25810000003 SODIUM CHLORIDE 0.9 % SOLUTION 250 ML FLEX CONT: Performed by: STUDENT IN AN ORGANIZED HEALTH CARE EDUCATION/TRAINING PROGRAM

## 2024-12-19 PROCEDURE — 25010000002 LIDOCAINE PF 2% 2 % SOLUTION: Performed by: STUDENT IN AN ORGANIZED HEALTH CARE EDUCATION/TRAINING PROGRAM

## 2024-12-19 PROCEDURE — 38792 RA TRACER ID OF SENTINL NODE: CPT

## 2024-12-19 PROCEDURE — 76098 X-RAY EXAM SURGICAL SPECIMEN: CPT

## 2024-12-19 PROCEDURE — A9520 TC99 TILMANOCEPT DIAG 0.5MCI: HCPCS | Performed by: STUDENT IN AN ORGANIZED HEALTH CARE EDUCATION/TRAINING PROGRAM

## 2024-12-19 PROCEDURE — 19301 PARTIAL MASTECTOMY: CPT | Performed by: REGISTERED NURSE

## 2024-12-19 PROCEDURE — 88342 IMHCHEM/IMCYTCHM 1ST ANTB: CPT | Performed by: STUDENT IN AN ORGANIZED HEALTH CARE EDUCATION/TRAINING PROGRAM

## 2024-12-19 PROCEDURE — 19301 PARTIAL MASTECTOMY: CPT | Performed by: STUDENT IN AN ORGANIZED HEALTH CARE EDUCATION/TRAINING PROGRAM

## 2024-12-19 PROCEDURE — 25010000002 HYDROMORPHONE 1 MG/ML SOLUTION: Performed by: STUDENT IN AN ORGANIZED HEALTH CARE EDUCATION/TRAINING PROGRAM

## 2024-12-19 PROCEDURE — S0260 H&P FOR SURGERY: HCPCS | Performed by: STUDENT IN AN ORGANIZED HEALTH CARE EDUCATION/TRAINING PROGRAM

## 2024-12-19 PROCEDURE — 88331 PATH CONSLTJ SURG 1 BLK 1SPC: CPT | Performed by: STUDENT IN AN ORGANIZED HEALTH CARE EDUCATION/TRAINING PROGRAM

## 2024-12-19 PROCEDURE — 25010000002 PROPOFOL 200 MG/20ML EMULSION: Performed by: STUDENT IN AN ORGANIZED HEALTH CARE EDUCATION/TRAINING PROGRAM

## 2024-12-19 PROCEDURE — 25810000003 LACTATED RINGERS PER 1000 ML: Performed by: STUDENT IN AN ORGANIZED HEALTH CARE EDUCATION/TRAINING PROGRAM

## 2024-12-19 PROCEDURE — 38525 BIOPSY/REMOVAL LYMPH NODES: CPT | Performed by: STUDENT IN AN ORGANIZED HEALTH CARE EDUCATION/TRAINING PROGRAM

## 2024-12-19 PROCEDURE — 25010000002 CEFAZOLIN PER 500 MG: Performed by: STUDENT IN AN ORGANIZED HEALTH CARE EDUCATION/TRAINING PROGRAM

## 2024-12-19 PROCEDURE — 25810000003 LACTATED RINGERS PER 1000 ML: Performed by: ANESTHESIOLOGY

## 2024-12-19 PROCEDURE — 25010000002 PHENYLEPHRINE 10 MG/ML SOLUTION: Performed by: STUDENT IN AN ORGANIZED HEALTH CARE EDUCATION/TRAINING PROGRAM

## 2024-12-19 DEVICE — LIGACLIP MCA MULTIPLE CLIP APPLIERS, 20 MEDIUM CLIPS
Type: IMPLANTABLE DEVICE | Site: BREAST | Status: FUNCTIONAL
Brand: LIGACLIP

## 2024-12-19 RX ORDER — SODIUM CHLORIDE 0.9 % (FLUSH) 0.9 %
3-10 SYRINGE (ML) INJECTION AS NEEDED
Status: DISCONTINUED | OUTPATIENT
Start: 2024-12-19 | End: 2024-12-19 | Stop reason: HOSPADM

## 2024-12-19 RX ORDER — HYDRALAZINE HYDROCHLORIDE 20 MG/ML
5 INJECTION INTRAMUSCULAR; INTRAVENOUS
Status: DISCONTINUED | OUTPATIENT
Start: 2024-12-19 | End: 2024-12-19 | Stop reason: HOSPADM

## 2024-12-19 RX ORDER — LABETALOL HYDROCHLORIDE 5 MG/ML
5 INJECTION, SOLUTION INTRAVENOUS
Status: DISCONTINUED | OUTPATIENT
Start: 2024-12-19 | End: 2024-12-19 | Stop reason: HOSPADM

## 2024-12-19 RX ORDER — DEXAMETHASONE SODIUM PHOSPHATE 4 MG/ML
INJECTION, SOLUTION INTRA-ARTICULAR; INTRALESIONAL; INTRAMUSCULAR; INTRAVENOUS; SOFT TISSUE AS NEEDED
Status: DISCONTINUED | OUTPATIENT
Start: 2024-12-19 | End: 2024-12-19 | Stop reason: SURG

## 2024-12-19 RX ORDER — LIDOCAINE HYDROCHLORIDE 20 MG/ML
INJECTION, SOLUTION EPIDURAL; INFILTRATION; INTRACAUDAL; PERINEURAL AS NEEDED
Status: DISCONTINUED | OUTPATIENT
Start: 2024-12-19 | End: 2024-12-19 | Stop reason: SURG

## 2024-12-19 RX ORDER — SODIUM CHLORIDE, SODIUM LACTATE, POTASSIUM CHLORIDE, CALCIUM CHLORIDE 600; 310; 30; 20 MG/100ML; MG/100ML; MG/100ML; MG/100ML
INJECTION, SOLUTION INTRAVENOUS CONTINUOUS PRN
Status: DISCONTINUED | OUTPATIENT
Start: 2024-12-19 | End: 2024-12-19 | Stop reason: SURG

## 2024-12-19 RX ORDER — ATROPINE SULFATE 0.4 MG/ML
0.4 INJECTION, SOLUTION INTRAMUSCULAR; INTRAVENOUS; SUBCUTANEOUS ONCE AS NEEDED
Status: DISCONTINUED | OUTPATIENT
Start: 2024-12-19 | End: 2024-12-19 | Stop reason: HOSPADM

## 2024-12-19 RX ORDER — HYDROCODONE BITARTRATE AND ACETAMINOPHEN 5; 325 MG/1; MG/1
1 TABLET ORAL ONCE AS NEEDED
Status: DISCONTINUED | OUTPATIENT
Start: 2024-12-19 | End: 2024-12-19 | Stop reason: HOSPADM

## 2024-12-19 RX ORDER — SUCCINYLCHOLINE CHLORIDE 20 MG/ML
INJECTION INTRAMUSCULAR; INTRAVENOUS AS NEEDED
Status: DISCONTINUED | OUTPATIENT
Start: 2024-12-19 | End: 2024-12-19 | Stop reason: SURG

## 2024-12-19 RX ORDER — SODIUM CHLORIDE, SODIUM LACTATE, POTASSIUM CHLORIDE, CALCIUM CHLORIDE 600; 310; 30; 20 MG/100ML; MG/100ML; MG/100ML; MG/100ML
9 INJECTION, SOLUTION INTRAVENOUS CONTINUOUS
Status: DISCONTINUED | OUTPATIENT
Start: 2024-12-19 | End: 2024-12-19 | Stop reason: HOSPADM

## 2024-12-19 RX ORDER — TRAMADOL HYDROCHLORIDE 50 MG/1
50 TABLET ORAL EVERY 6 HOURS PRN
Qty: 12 TABLET | Refills: 0 | Status: SHIPPED | OUTPATIENT
Start: 2024-12-19

## 2024-12-19 RX ORDER — MAGNESIUM SULFATE HEPTAHYDRATE 500 MG/ML
INJECTION, SOLUTION INTRAMUSCULAR; INTRAVENOUS AS NEEDED
Status: DISCONTINUED | OUTPATIENT
Start: 2024-12-19 | End: 2024-12-19 | Stop reason: SURG

## 2024-12-19 RX ORDER — FENTANYL CITRATE 50 UG/ML
INJECTION, SOLUTION INTRAMUSCULAR; INTRAVENOUS AS NEEDED
Status: DISCONTINUED | OUTPATIENT
Start: 2024-12-19 | End: 2024-12-19 | Stop reason: SURG

## 2024-12-19 RX ORDER — DIAZEPAM 5 MG/1
10 TABLET ORAL ONCE
Status: COMPLETED | OUTPATIENT
Start: 2024-12-19 | End: 2024-12-19

## 2024-12-19 RX ORDER — PHENYLEPHRINE HYDROCHLORIDE 10 MG/ML
INJECTION INTRAVENOUS AS NEEDED
Status: DISCONTINUED | OUTPATIENT
Start: 2024-12-19 | End: 2024-12-19 | Stop reason: SURG

## 2024-12-19 RX ORDER — ISOSULFAN BLUE 50 MG/5ML
INJECTION, SOLUTION SUBCUTANEOUS AS NEEDED
Status: DISCONTINUED | OUTPATIENT
Start: 2024-12-19 | End: 2024-12-19 | Stop reason: HOSPADM

## 2024-12-19 RX ORDER — ONDANSETRON 2 MG/ML
4 INJECTION INTRAMUSCULAR; INTRAVENOUS ONCE AS NEEDED
Status: DISCONTINUED | OUTPATIENT
Start: 2024-12-19 | End: 2024-12-19 | Stop reason: HOSPADM

## 2024-12-19 RX ORDER — SODIUM CHLORIDE 0.9 % (FLUSH) 0.9 %
3 SYRINGE (ML) INJECTION EVERY 12 HOURS SCHEDULED
Status: DISCONTINUED | OUTPATIENT
Start: 2024-12-19 | End: 2024-12-19 | Stop reason: HOSPADM

## 2024-12-19 RX ORDER — FENTANYL CITRATE 50 UG/ML
50 INJECTION, SOLUTION INTRAMUSCULAR; INTRAVENOUS ONCE AS NEEDED
Status: DISCONTINUED | OUTPATIENT
Start: 2024-12-19 | End: 2024-12-19 | Stop reason: HOSPADM

## 2024-12-19 RX ORDER — NALOXONE HCL 0.4 MG/ML
0.2 VIAL (ML) INJECTION AS NEEDED
Status: DISCONTINUED | OUTPATIENT
Start: 2024-12-19 | End: 2024-12-19 | Stop reason: HOSPADM

## 2024-12-19 RX ORDER — HYDROMORPHONE HYDROCHLORIDE 1 MG/ML
0.5 INJECTION, SOLUTION INTRAMUSCULAR; INTRAVENOUS; SUBCUTANEOUS
Status: DISCONTINUED | OUTPATIENT
Start: 2024-12-19 | End: 2024-12-19 | Stop reason: HOSPADM

## 2024-12-19 RX ORDER — KETAMINE HCL IN NACL, ISO-OSM 100MG/10ML
SYRINGE (ML) INJECTION AS NEEDED
Status: DISCONTINUED | OUTPATIENT
Start: 2024-12-19 | End: 2024-12-19 | Stop reason: SURG

## 2024-12-19 RX ORDER — OXYCODONE AND ACETAMINOPHEN 7.5; 325 MG/1; MG/1
1 TABLET ORAL EVERY 4 HOURS PRN
Status: DISCONTINUED | OUTPATIENT
Start: 2024-12-19 | End: 2024-12-19 | Stop reason: HOSPADM

## 2024-12-19 RX ORDER — IPRATROPIUM BROMIDE AND ALBUTEROL SULFATE 2.5; .5 MG/3ML; MG/3ML
3 SOLUTION RESPIRATORY (INHALATION) ONCE AS NEEDED
Status: DISCONTINUED | OUTPATIENT
Start: 2024-12-19 | End: 2024-12-19 | Stop reason: HOSPADM

## 2024-12-19 RX ORDER — PROMETHAZINE HYDROCHLORIDE 25 MG/1
25 SUPPOSITORY RECTAL ONCE AS NEEDED
Status: DISCONTINUED | OUTPATIENT
Start: 2024-12-19 | End: 2024-12-19 | Stop reason: HOSPADM

## 2024-12-19 RX ORDER — FENTANYL CITRATE 50 UG/ML
50 INJECTION, SOLUTION INTRAMUSCULAR; INTRAVENOUS
Status: DISCONTINUED | OUTPATIENT
Start: 2024-12-19 | End: 2024-12-19 | Stop reason: HOSPADM

## 2024-12-19 RX ORDER — SCOLOPAMINE TRANSDERMAL SYSTEM 1 MG/1
1 PATCH, EXTENDED RELEASE TRANSDERMAL ONCE
Status: DISCONTINUED | OUTPATIENT
Start: 2024-12-19 | End: 2024-12-19 | Stop reason: HOSPADM

## 2024-12-19 RX ORDER — LIDOCAINE HYDROCHLORIDE 10 MG/ML
5 INJECTION, SOLUTION INFILTRATION; PERINEURAL ONCE
Status: COMPLETED | OUTPATIENT
Start: 2024-12-19 | End: 2024-12-19

## 2024-12-19 RX ORDER — MIDAZOLAM HYDROCHLORIDE 1 MG/ML
1 INJECTION, SOLUTION INTRAMUSCULAR; INTRAVENOUS
Status: DISCONTINUED | OUTPATIENT
Start: 2024-12-19 | End: 2024-12-19 | Stop reason: HOSPADM

## 2024-12-19 RX ORDER — ONDANSETRON 2 MG/ML
4 INJECTION INTRAMUSCULAR; INTRAVENOUS EVERY 6 HOURS PRN
Status: DISCONTINUED | OUTPATIENT
Start: 2024-12-19 | End: 2024-12-19 | Stop reason: HOSPADM

## 2024-12-19 RX ORDER — ROCURONIUM BROMIDE 10 MG/ML
INJECTION, SOLUTION INTRAVENOUS AS NEEDED
Status: DISCONTINUED | OUTPATIENT
Start: 2024-12-19 | End: 2024-12-19 | Stop reason: SURG

## 2024-12-19 RX ORDER — PROMETHAZINE HYDROCHLORIDE 25 MG/1
25 TABLET ORAL ONCE AS NEEDED
Status: DISCONTINUED | OUTPATIENT
Start: 2024-12-19 | End: 2024-12-19 | Stop reason: HOSPADM

## 2024-12-19 RX ORDER — DIPHENHYDRAMINE HYDROCHLORIDE 50 MG/ML
12.5 INJECTION INTRAMUSCULAR; INTRAVENOUS
Status: DISCONTINUED | OUTPATIENT
Start: 2024-12-19 | End: 2024-12-19 | Stop reason: HOSPADM

## 2024-12-19 RX ORDER — FLUMAZENIL 0.1 MG/ML
0.2 INJECTION INTRAVENOUS AS NEEDED
Status: DISCONTINUED | OUTPATIENT
Start: 2024-12-19 | End: 2024-12-19 | Stop reason: HOSPADM

## 2024-12-19 RX ORDER — EPHEDRINE SULFATE 50 MG/ML
5 INJECTION, SOLUTION INTRAVENOUS ONCE AS NEEDED
Status: DISCONTINUED | OUTPATIENT
Start: 2024-12-19 | End: 2024-12-19 | Stop reason: HOSPADM

## 2024-12-19 RX ORDER — LIDOCAINE HYDROCHLORIDE 10 MG/ML
0.5 INJECTION, SOLUTION INFILTRATION; PERINEURAL ONCE AS NEEDED
Status: DISCONTINUED | OUTPATIENT
Start: 2024-12-19 | End: 2024-12-19 | Stop reason: HOSPADM

## 2024-12-19 RX ORDER — PROPOFOL 10 MG/ML
INJECTION, EMULSION INTRAVENOUS AS NEEDED
Status: DISCONTINUED | OUTPATIENT
Start: 2024-12-19 | End: 2024-12-19 | Stop reason: SURG

## 2024-12-19 RX ORDER — FAMOTIDINE 10 MG/ML
20 INJECTION, SOLUTION INTRAVENOUS ONCE
Status: COMPLETED | OUTPATIENT
Start: 2024-12-19 | End: 2024-12-19

## 2024-12-19 RX ORDER — ONDANSETRON 2 MG/ML
INJECTION INTRAMUSCULAR; INTRAVENOUS AS NEEDED
Status: DISCONTINUED | OUTPATIENT
Start: 2024-12-19 | End: 2024-12-19 | Stop reason: SURG

## 2024-12-19 RX ORDER — GABAPENTIN 100 MG/1
100 CAPSULE ORAL EVERY 8 HOURS PRN
Qty: 42 CAPSULE | Refills: 0 | Status: SHIPPED | OUTPATIENT
Start: 2024-12-19

## 2024-12-19 RX ADMIN — PHENYLEPHRINE HYDROCHLORIDE 50 MCG/MIN: 10 INJECTION, SOLUTION INTRAVENOUS at 10:34

## 2024-12-19 RX ADMIN — PROPOFOL 50 MG: 10 INJECTION, EMULSION INTRAVENOUS at 10:18

## 2024-12-19 RX ADMIN — FENTANYL CITRATE 50 MCG: 50 INJECTION, SOLUTION INTRAMUSCULAR; INTRAVENOUS at 09:50

## 2024-12-19 RX ADMIN — LIDOCAINE HYDROCHLORIDE 60 MG: 20 INJECTION, SOLUTION EPIDURAL; INFILTRATION; INTRACAUDAL; PERINEURAL at 09:52

## 2024-12-19 RX ADMIN — PHENYLEPHRINE HYDROCHLORIDE 200 MCG: 10 INJECTION INTRAVENOUS at 10:39

## 2024-12-19 RX ADMIN — PHENYLEPHRINE HYDROCHLORIDE 200 MCG: 10 INJECTION INTRAVENOUS at 10:12

## 2024-12-19 RX ADMIN — SUGAMMADEX 200 MG: 100 INJECTION, SOLUTION INTRAVENOUS at 12:20

## 2024-12-19 RX ADMIN — PROPOFOL 50 MG: 10 INJECTION, EMULSION INTRAVENOUS at 10:08

## 2024-12-19 RX ADMIN — PHENYLEPHRINE HYDROCHLORIDE 200 MCG: 10 INJECTION INTRAVENOUS at 10:17

## 2024-12-19 RX ADMIN — PHENYLEPHRINE HYDROCHLORIDE 100 MCG: 10 INJECTION INTRAVENOUS at 10:34

## 2024-12-19 RX ADMIN — SODIUM CHLORIDE, POTASSIUM CHLORIDE, SODIUM LACTATE AND CALCIUM CHLORIDE: 600; 310; 30; 20 INJECTION, SOLUTION INTRAVENOUS at 09:47

## 2024-12-19 RX ADMIN — HYDROMORPHONE HYDROCHLORIDE 1 MG: 1 INJECTION, SOLUTION INTRAMUSCULAR; INTRAVENOUS; SUBCUTANEOUS at 10:01

## 2024-12-19 RX ADMIN — DIAZEPAM 10 MG: 5 TABLET ORAL at 06:50

## 2024-12-19 RX ADMIN — FENTANYL CITRATE 50 MCG: 50 INJECTION, SOLUTION INTRAMUSCULAR; INTRAVENOUS at 10:19

## 2024-12-19 RX ADMIN — SCOPOLAMINE 1 PATCH: 1.5 PATCH, EXTENDED RELEASE TRANSDERMAL at 07:12

## 2024-12-19 RX ADMIN — ROCURONIUM BROMIDE 30 MG: 10 INJECTION, SOLUTION INTRAVENOUS at 11:14

## 2024-12-19 RX ADMIN — OXYCODONE AND ACETAMINOPHEN 1 TABLET: 7.5; 325 TABLET ORAL at 13:10

## 2024-12-19 RX ADMIN — SODIUM CHLORIDE, SODIUM LACTATE, POTASSIUM CHLORIDE, CALCIUM CHLORIDE 9 ML/HR: 20; 30; 600; 310 INJECTION, SOLUTION INTRAVENOUS at 06:50

## 2024-12-19 RX ADMIN — Medication 50 MG: at 09:51

## 2024-12-19 RX ADMIN — Medication 3 ML: at 08:13

## 2024-12-19 RX ADMIN — ONDANSETRON 4 MG: 2 INJECTION INTRAMUSCULAR; INTRAVENOUS at 12:11

## 2024-12-19 RX ADMIN — DEXAMETHASONE SODIUM PHOSPHATE 10 MG: 4 INJECTION, SOLUTION INTRAMUSCULAR; INTRAVENOUS at 09:59

## 2024-12-19 RX ADMIN — PHENYLEPHRINE HYDROCHLORIDE 100 MCG: 10 INJECTION INTRAVENOUS at 10:23

## 2024-12-19 RX ADMIN — ONDANSETRON 4 MG: 2 INJECTION, SOLUTION INTRAMUSCULAR; INTRAVENOUS at 07:23

## 2024-12-19 RX ADMIN — FAMOTIDINE 20 MG: 10 INJECTION INTRAVENOUS at 07:12

## 2024-12-19 RX ADMIN — PROPOFOL 60 MG: 10 INJECTION, EMULSION INTRAVENOUS at 12:13

## 2024-12-19 RX ADMIN — SUCCINYLCHOLINE CHLORIDE 120 MG: 20 INJECTION, SOLUTION INTRAMUSCULAR; INTRAVENOUS; PARENTERAL at 09:54

## 2024-12-19 RX ADMIN — SODIUM CHLORIDE 2000 MG: 900 INJECTION INTRAVENOUS at 09:40

## 2024-12-19 RX ADMIN — TILMANOCEPT 1 DOSE: KIT at 08:22

## 2024-12-19 RX ADMIN — PHENYLEPHRINE HYDROCHLORIDE 100 MCG: 10 INJECTION INTRAVENOUS at 10:29

## 2024-12-19 RX ADMIN — PROPOFOL 200 MG: 10 INJECTION, EMULSION INTRAVENOUS at 09:53

## 2024-12-19 RX ADMIN — MAGNESIUM SULFATE HEPTAHYDRATE 1 G: 500 INJECTION, SOLUTION INTRAMUSCULAR; INTRAVENOUS at 10:23

## 2024-12-19 NOTE — DISCHARGE INSTRUCTIONS
Partial Mastectomy & [place] Lymph Node Biopsy    Preparing for your Surgery:  You will be scheduled for an appointment in the days before surgery to have blood work and other possible tests to make sure it's safe to have surgery.  You may be scheduled for other images OR procedures to help plan and guide the surgery.  You will go home the same day as your procedure. Please be sure a  is available to take you home, and someone can stay the rest of the day with you.   Remember - nothing to eat or drink after midnight the night before your surgery. It's OK to take your required medicine the morning of with a small sip of water.   Your Surgery:  Depending on the location and size of your abnormal breast tissue, you will wither have an incision near the mass or around part of your nipple.  The surgery removes the abnormal breast tissues and some of the normal surrounding tissue. The pathologists will examine the tissue to make a final diagnosis and evaluate the edges.   The surgery removes a sample of lymph nodes from your armpit/axilla. Your skin will be blue and your urine may be blue or green for several days.   There will be stitches on the inside that will dissolve, the incision will be covered in surgical glue which falls off after a couple of days.  The final pathology will determine if any additional surgery is required to the breast or axilla.   After Your Surgery  Do not drive or make important decisions the day of surgery.  Someone should stay with you the rest of the day and overnight.  Wear your surgical bra, compression bra or ace bandage wrap until your follow up appointment. OK to remove it to shower.  You may shower 24 hours after surgery. Do not scrub the incision, let soapy water run down over the incision, then pat dry. Keep the incision clean and dry. No baths, pools, or spas until your follow up appointment.   OK to use ice packs for pain control. Please do not use heating  pads.    Diet/Activity  OK to eat a regular diet. Do not drink alcohol while taking pain medication.  No lifting, pushing, pulling anything over 5 lbs - avoid sports, heavy work, stair climbers, or elliptical machines.   To reduce the risk of blood clots, get up and move every 90 minutes during the day. Do not lay or sit in one position for more than 90 minutes.     Moira Mccain MD  [place] [id number] [phone]      Scopolamine Patch  This patch has been applied to the skin behind one of your ears.  It may stay in place up to 24 hours. You may remove it at any time after your surgery; however, it should be removed after you are up and walking around the next day.  This medicine reduces stomach upset. Side effects may include: dry mouth, dizziness, sleepiness, constipation, or upset stomach.  An allergy would show up as: a rash, itching, wheezing or shortness of breath.  Follow these instructions:  Do not drink alcohol, drive or operate machinery while taking this medicine.  Wear only 1 patch at a time. You can leave the patch on for up to 24 hours.  When you remove the patch, fold it in half with the sticky sides together and throw it away. Wash your hands and the area under the patch.  Do not touch your eye with your hand if it has touched the patch.  Wash your hands well before and after touching the patch.  Sit or stand slowly to avoid dizziness.  Call your doctor if you have:  Any sign of allergy  No relief  Trouble passing urine  Any new or severe symptoms

## 2024-12-19 NOTE — ANESTHESIA PREPROCEDURE EVALUATION
Anesthesia Evaluation     Patient summary reviewed and Nursing notes reviewed   NPO Solid Status: > 8 hours             Airway   Mallampati: III  Possible difficult intubation and Large neck circumference  Dental - normal exam     Pulmonary    (+) ,sleep apnea  Cardiovascular     ECG reviewed  Rhythm: regular    (+) hyperlipidemia    ROS comment: Baseline echo with ejection fraction 65%, GLS -19.6% with grade 1 diastolic dysfunction trivial valve regurgitation.  There was moderate left ventricular hypertrophy.    sinus rhythm    Neuro/Psych  (+) psychiatric history Anxiety and PTSD  GI/Hepatic/Renal/Endo    (+) morbid obesity    ROS Comment: Pcos  Metabolic syndrome      Musculoskeletal (-) negative ROS    Abdominal   (+) obese   Substance History - negative use     OB/GYN negative ob/gyn ROS         Other      history of cancer                  Anesthesia Plan    ASA 3     general     intravenous induction     Anesthetic plan, risks, benefits, and alternatives have been provided, discussed and informed consent has been obtained with: patient.    CODE STATUS:

## 2024-12-19 NOTE — ANESTHESIA POSTPROCEDURE EVALUATION
Patient: Sd Man    Procedure Summary       Date: 12/19/24 Room / Location: [facility] OR  / [facility] MAIN OR    Anesthesia Start: 0947 Anesthesia Stop: 1237    Procedure: Left partial mastectomy, wire localized, left sentinel lymph node biopsy, targeted axillary dissection - wire localized (intra-operative) (Left: Breast) Diagnosis:       Malignant neoplasm of upper-outer quadrant of left breast in female, estrogen receptor positive      (Malignant neoplasm of upper-outer quadrant of left breast in female, estrogen receptor positive [C50.412, Z17.0])    Surgeons: Moira Mccain MD Provider: Abdifatah Calderon MD    Anesthesia Type: general ASA Status: 3            Anesthesia Type: general    Vitals  Vitals Value Taken Time   /72 12/19/24 1330   Temp 36.9 °C (98.5 °F) 12/19/24 1233   Pulse 82 12/19/24 1341   Resp 16 12/19/24 1330   SpO2 90 % 12/19/24 1341   Vitals shown include unfiled device data.        Post Anesthesia Care and Evaluation    Patient location during evaluation: bedside  Patient participation: complete - patient participated  Level of consciousness: awake  Pain management: adequate    Airway patency: patent  Anesthetic complications: No anesthetic complications  PONV Status: controlled  Cardiovascular status: acceptable  Respiratory status: acceptable  Hydration status: acceptable    Comments: --------------------            12/19/24               1347     --------------------   BP:       120/79     Pulse:      83       Resp:       16       Temp:                SpO2:      92%      --------------------

## 2024-12-19 NOTE — H&P
[facility]     Referring Provider: Nely Jesus     Chief complaint: breast mass and newly diagnosed breast cancer    Subjective    HPI: Ms. Sd Man is a 59 y.o. yo woman, seen at the request of Nely Jesus (OBGYN) for a new diagnosis of left breast cancer.      She noted an area of pain in her left breast that started several months ago.  She noted a palpable mass approximately 6 weeks ago.  She thought it might be due to increased size of the lymph node after a period of a scratchy throat.  She then presented to her OB/GYN who ordered diagnostic imaging and further workup.  She has not had recent imaging of her breast for screening.    She denies any prior history of abnormal mammograms or breast biopsies.     She reports she has a pertinent PMH of PCOS.  She has had significant stressors due to job changes and family health concerns including stressful care of her children over the past several years.  She underwent a screening colonoscopy and was found to have a suspicious polyp and close follow-up was recommended.  She has not been able to return for follow-up colonoscopy and is concerned there could be an underlying colon malignancy.  She also went through a complicated divorce before [facility].    She was joined today in clinic by her friend. They all participated in the discussion, after her examination, and she gave her consent for them to participate.    HPI 5/15/24  She completed breast MRI.  She notes the lesion in her left lateral breast feels subjectively larger.  The area continues to be painful and heavy feeling.  Denies any new symptoms.    HPI 5/29/24  Completed staging scans. No concerns for metastatic disease. US recommended for liver cyst.   She remains anxious about all possible therapies - side effects from radiation and chemotherapy. She is interested in discussion with behavioral oncology and medical oncology to discuss expected side effects and course of therapy.     HPI  11/8/24  Prior to start of chemotherapy contracted COVID and then required dental work which delayed her start of neoadjuvant therapy.  During this time she noted ongoing increase in the size of her primary breast tumor.  This was incredibly concerning.  After her initial doses of chemotherapy noted immediate treatment response with initial inflammation then marked shrinkage in the size of the mass in her breast and axilla.  She is now nearing the end of ACT neoadjuvant therapy, initiated chemo in [place] near family, has transitioned back to [place].  Overall tolerating chemo well has complications from chemo including alopecia, GI changes, skin changes and nail changes.  Is getting ready to complete a rock hunting trip this weekend.  Is looking forward to surgery.    HPI 12/19/24  Ready for surgery today      Oncology/Hematology History   Malignant neoplasm of upper-outer quadrant of left breast in female, estrogen receptor positive   4/16/2024 Cancer Staged    Staging form: Breast, AJCC 8th Edition  - Clinical stage from 4/16/2024: Stage IIB (cT2, cN1(f), cM0, G3, ER+, MT+, HER2-) - Signed by Moira Mccain MD on 5/15/2024     4/30/2024 Initial Diagnosis    Malignant neoplasm of upper-outer quadrant of left breast in female, estrogen receptor positive     9/3/2024 -  Chemotherapy    OP BREAST PACLitaxel Adjuvant (Weekly X 12)         Review of Systems   Constitutional:  Negative for appetite change, fatigue and fever.   Respiratory:  Negative for cough and shortness of breath.    Gastrointestinal:  Negative for abdominal distention, diarrhea and nausea.   Musculoskeletal:  Negative for arthralgias and back pain.   Neurological:  Negative for dizziness, headaches and speech difficulty.   Psychiatric/Behavioral:  Positive for depression. Negative for decreased concentration and sleep disturbance. The patient is nervous/anxious.        Medications:    Current Facility-Administered Medications:     ceFAZolin 2000  mg IVPB in 100 mL NS (MBP), 2,000 mg, Intravenous, Once, Moira Mccain MD    fentaNYL citrate (PF) (SUBLIMAZE) injection 50 mcg, 50 mcg, Intravenous, Once PRN, Ronaldo Peacock MD    lactated ringers infusion, 9 mL/hr, Intravenous, Continuous, Ronaldo Peacock MD, Last Rate: 9 mL/hr at 12/19/24 0650, 9 mL/hr at 12/19/24 0650    lidocaine (XYLOCAINE) 1 % injection 0.5 mL, 0.5 mL, Intradermal, Once PRN, Ronaldo Peacock MD    midazolam (VERSED) injection 1 mg, 1 mg, Intravenous, Q5 Min PRN, Ronaldo Peacock MD    ondansetron (ZOFRAN) injection 4 mg, 4 mg, Intravenous, Q6H PRN, Kiran Peacock MD, 4 mg at 12/19/24 0723    scopolamine patch 1 mg/72 hr, 1 patch, Transdermal, Once, Ronaldo Peacock MD, 1 patch at 12/19/24 0712    sodium chloride 0.9 % flush 3 mL, 3 mL, Intravenous, Q12H, Ronaldo Peacock MD    sodium chloride 0.9 % flush 3-10 mL, 3-10 mL, Intravenous, PRN, Ronaldo Peacock MD    Allergies:  Allergies   Allergen Reactions    Pepcid [Famotidine] Nausea And Vomiting       Medical history:  Past Medical History:   Diagnosis Date    ADHD     Anxiety     Breast cancer     Depression     Hypoxemia associated with sleep     Insulin resistance     Metabolic syndrome X     Obesity 11/11/2012    CHELO on CPAP 06/17/2024    Home sleep study.  Weight 242 pounds.  Moderate CHELO with AHI 19.4 events per hour.  Low oxygen saturation 66% and sleep-related hypoxia abnormal for 43.4 minutes.    PCOS (polycystic ovarian syndrome)     PTSD (post-traumatic stress disorder)        Surgical History:  Past Surgical History:   Procedure Laterality Date    COLONOSCOPY      OOPHORECTOMY Bilateral 01/01/2014    VENOUS ACCESS DEVICE (PORT) INSERTION Right 06/20/2024    Procedure: INSERTION VENOUS ACCESS DEVICE;  Surgeon: Moira Mccain MD;  Location: [facility] OR;  Service: General;  Laterality: Right;       Family History:  Family History   Problem Relation Age of Onset    Melanoma Mother 75        alive currently 79    Atrial  fibrillation Father     Hypertension Father     Malig Hyperthermia Neg Hx        Family Cancer History: relationship - (age of diagnosis/current age or age at death)  Breast: Denies  Ovarian: Denies  Colon: Denies  Pancreas: Denies  Prostate: Denies    Social History:   Social History     Socioeconomic History    Marital status:    Tobacco Use    Smoking status: Never     Passive exposure: Never    Smokeless tobacco: Never   Vaping Use    Vaping status: Never Used   Substance and Sexual Activity    Alcohol use: Never    Drug use: Never    Sexual activity: Not Currently       She lives by herself  She works as a , at [employer], has returned to working 3 days a week         GYNECOLOGIC HISTORY:   G: 3. P: 3. AB: 0.  Last menstrual period: 47  Age at menarche: 14  Age at first childbirth: 32  Lactation/How lon years  Age at menopause: 47  Total years of oral contraceptive use: None  Total years of hormone replacement therapy: Several years      Objective   PHYSICAL EXAMINATION:   Vitals:    24 0630   BP: 129/88   Pulse: 89   Resp: 16   Temp: 98.2 °F (36.8 °C)   SpO2: 94%       ECOG 0 - Asymptomatic  General: NAD, well appearing  Psych: a&o x 3, anxious, tangential conversation  Eyes: EOMI, no scleral icterus  ENMT: neck supple without masses or thyromegaly, mucus membranes moist  Resp: normal effort  CV: RRR, no edema   GI: soft, NT, ND  MSK: normal gait, normal ROM in bilateral shoulders  Lymph nodes:  no cervical, supraclavicular lymphadenopathy, left axilla with palpable mobile axillary lymphadenopathy  Breast: symmetric, bra 42C, broad base of the breast  Right:  No visible abnormalities on inspection while seated, with arms raised or hands on hips. No masses, skin changes, or nipple abnormalities.  Left:  No visible abnormalities on inspection while seated. No skin changes, or nipple abnormalities.  No palpable masses in breast or axilla on exam today    Previous IMAGING: I have  independently reviewed her imagin2024 bilateral diagnostic mammogram  The breasts are heterogeneously dense which may obscure small masses.  Finding 1: There is a high density irregular mass measuring 20 mm with indistinct margins seen in the posterior one third region left breast 2:00 11 cm from nipple.  Patient indicates a palpable lump or thickening in the left breast.  The symptomatic area is clearly marked.  In the right breast no suspicious masses significant calcifications or other abnormalities are seen.  Left breast ultrasound  Finding 1: Ultrasound demonstrates an irregular solid mass with indistinct margins measuring 20 x 17 x 20 mm seen in the posterior one third region of the left breast 2:00 11 cm from the nipple.  This correlates to the palpable area.  Finding 2: There is a lymph node measuring 20 x 9 x 13 mm seen in the left breast the cortex is prominent measuring up to 4 mm in thickness  Impression:  Finding 1: Solid mass in the left breast is suspicious.  Ultrasound-guided vacuum-assisted biopsy is recommended.  Finding 2: Lymph node in the left breast is suspicious FNA possible spring-loaded or limited core biopsy is recommended.  BI-RADS 4C    2024 left ultrasound-guided biopsy  Patient's left breast in the axillary position was imaged and the abnormal lymph node was localized.  Using a 12-gauge biopsy device 5 cores were obtained.  A HydroMARK tissue marker was placed at the biopsy site.  2 view postprocedure mammogram and ultrasound confirmed the clip in the expected location.  Pathology returned as malignant and concordant.  Report describes a free-floating fragment of carcinoma, suspicious for metastasis.      5/10/24 Breast MRI  FINDINGS: Scattered fibroglandular tissue is seen throughout both  breast. Mild background parenchymal enhancement of both breasts is  noted.     In the posterior one third lateral aspect of the left breast at the 3  o'clock position on the order of 12  cm from the nipple. There is an  irregular enhancing mass that measures 2.6 cm in anterior to posterior  dimension, 2.4 cm in the superior to inferior dimension and 2.7 cm in  the mediolateral dimension. A focal signal void is seen centrally within  the mass. This represents the biopsy-proven site of malignancy with the  internal mini cork shaped metallic clip.     No other areas of suspicious enhancement or morphology are seen in the  left breast. I see no evidence for abnormal skin, nipple or chest wall  enhancement of the left breast. There is an inferior left axillary lymph  node that measures on the order of 2 cm in greatest dimension and shows  diffuse cortical thickening with the cortex measuring up to 0.8 cm in  thickness. Mild surrounding edema is noted. This is in the expected  location of the biopsied lymph node with the associated coil-shaped  HydroMARK metallic clip at the inferior margin seen on the mammogram  dated 04/16/2024. No evidence for left internal mammary chain adenopathy  is appreciated.     There are no areas of suspicious enhancement or morphology in the right  breast. I see no evidence for abnormal skin, nipple or chest wall  enhancement of the right breast and there is no evidence for right  axillary or internal mammary chain adenopathy.        IMPRESSION:  1. Biopsy-proven malignancy in the left breast in the posterior one  third at the 3 o'clock position that measures on the order of 2.7 cm in  greatest dimension. No other suspicious findings are seen within the  left breast. A left axillary lymph node that has undergone prior biopsy  and shows diffuse cortical thickening is noted and is suspicious for  left axillary adenopathy are noted.  2. There are no findings suspicious for malignancy in the right breast.    5/23/24 CT Chest abdomen/pelvis  CLINICAL INFORMATION: Newly diagnosed breast carcinoma, staging.     FINDINGS:  1. Cardiac size within normal limits, no pericardial or  esophageal  abnormality. Diameter of the aorta is within normal limits, no  mediastinal or hilar mass/lymphadenopathy. The lungs are clear. No  pleural effusion or acute airspace disease.     2. Typical-appearing bone island is demonstrated within the right iliac  wing, subchondral cyst is demonstrated within the left acetabulum. No  suspicious lytic or sclerotic bone lesion.     3. 2.4 cm rounded density within the lateral aspect of the left breast  with a surgical marker centrally. Prominent 2.4 cm left axillary lymph  node, this has been previously biopsied.     4. 12 mm subcapsular cyst left lobe of the liver seen best on image 74.  Attenuation compatible with serous fluid. Second likely cyst within the  right lobe of the liver, measuring 15 mm, seen best on image #105.  Attenuation compatible with slightly complex fluid. No grossly  suspicious liver lesion identified, the adrenal glands have a  satisfactory appearance.     5. The gallbladder, pancreas, spleen and kidneys are normal. Diameter of  the aorta is within normal limits. The bladder is collapsed, contour  within normal limits. The uterus is satisfactory in size and shape, the  ovaries have been removed. There is diverticulosis of the colon without  diverticulitis. The small bowel is satisfactory in appearance. The  stomach and duodenum are unremarkable. No abdominal or pelvic  lymphadenopathy. No free intraperitoneal fluid seen.     CONCLUSION: There is a nodular density within the lateral aspect of the  left breast at the site of biopsy, enlarged left axillary lymph node  seen, this previously biopsied. No convincing evidence of metastatic  disease involving the chest, abdomen or pelvis; no suspicious bone  lesion. There appear to be 2 small hepatic cysts, 1 of these likely  complex (I would recommend liver ultrasound for confirmation). Fairly  typical-appearing bone island within the right iliac wing.    5/23/24 Bone Scan  INDICATION: Breast cancer    COMPARISON: CT chest abdomen pelvis 5/23/2024  FINDINGS: No suspicious focus of increased or decreased MDP activity.  Physiologic activity remains within the kidneys and urinary bladder with  urinary contamination.     IMPRESSION:  No scintigraphic evidence of osseous metastatic disease.      11/8/24 Left Ultrasound Breast, Targeted  Procedure: Diagnostic ultrasound, Left breast  Indication: followup near end of chemotherapy  Comparisons: 4/9/24 Diagnostic mammogram and US, 5/10/24 Breast MRI  Description of procedure: Using a 10MHz linear transducer, I scanned the breast at the area of interest.  Findings:  At 3:00, 11 cm FN, there is a hypoechoic, irregular, ill-defined, homogeneous mass with posterior shadowing, measuring 0.9 x 0.9 cm in radial dimension and 1.1 x 1.2 cm in antiradial dimension. The clip is noted to be in the superior aspect of the lesion.     At 2:00, 13 cm from the nipple there is a visualized HydroMARK biopsy clip.  It is not within any discernible structure but is adjacent to 2 lymph nodes.  The cortex is visible in both, no suspicious thickening identified.    Impression:   Previously biopsy proven breast cancer and lymph node with metastatic disease. Both locations show significant decrease in size and lymph nodes have returned to near normal morphology.  BI-RADS 6.    Plan:    Continue with oncologic plan of care.         PATHOLOGY:   4/16/24  1.  Left breast, 2:00, ultrasound-biopsies for mass: INVASIVE MAMMARY CARCINOMA, NO SPECIAL TYPE (INVASIVE DUCTAL CARCINOMA).               -Histologic grade: Mills is logic score III (tubules = 3, nuclei = 3, mitosis = 2).               -No in situ component identified.               -Invasive carcinoma involves multiple tissue cores, measuring up to 14 mm maximally.               -No lymphovascular nor perineural invasion identified.     2. Lymph node, left axilla, core biopsies:               -Free floating fragment of carcinoma, suspicious  for metastasis (see Comment).    Estrogen Receptor (ER) Status  Positive (greater than 10% of cells demonstrate nuclear positivity)   Percentage of Cells with Nuclear Positivity  %   Average Intensity of Staining  Strong   Test Type  Food and Drug Administration (FDA) cleared (test / vendor): ventana   Primary Antibody  SP1   Scoring System  Kathy   Proportion Score  5   Intensity Score  3   Total Kathy Score  8   Test(s) Performed     Progesterone Receptor (PgR) Status  Positive   Percentage of Cells with Nuclear Positivity  31-40%   Average Intensity of Staining  Moderate   Test Type  Food and Drug Administration (FDA) cleared (test / vendor): ventana   Primary Antibody  1E2   Scoring System  Kathy   Proportion Score  4   Intensity Score  2   Total Kathy Score  6   Test(s) Performed     HER2 by Immunohistochemistry  Negative (Score 1+)   Test Type  Food and Drug Administration (FDA) cleared (test / vendor): ventana   Primary Antibody  4B5   Test(s) Performed  Ki-67   Ki-67 Percentage of Positive Nuclei  95 %         Assessment & Plan   Assessment:  59 y.o. F with a new diagnosis of left breast: Invasive ductal carcinoma grade III, ER/IL +, Her2 -; T2N1M0, anatomic stage IIB, prognostic stage IIB.      Discussion:  I had an extensive discussion with the patient and her family about the nature of her breast cancer diagnosis. We reviewed the components of breast tissue including ducts and lobules. We reviewed her pathology report in detail. We reviewed breast cancer histology, including stage, grade, ER/IL receptors, HER2 receptors and how this applies to her diagnosis. We reviewed the basics of systemic and local/regional management of breast cancer.     We reviewed potential surgical treatments to include partial mastectomy, mastectomy, sentinel lymph node biopsy and axillary node dissection and discussed the rationale associated with each approach. Regarding radiation therapy, we discussed that radiation  is indicated in all cases of breast conservation and in only limited circumstances following mastectomy. Partial mastectomy with radiation and mastectomy were explained with option of reconstruction.  The patient was informed that from a survival standpoint, both procedures are oncologically equivalent.   We discussed that the primary goal of adjuvant radiation is to decrease the likelihood of local recurrence.     I described additional risks and potential complications associated with surgery, including, but not limited to, bleeding, infection, deformity/poor cosmetic result, chronic pain, numbness, seroma, hematoma, deep venous thrombosis, skin flap necrosis, disease recurrence and the possibility of requiring additional surgery. We also discussed other treatment options including the option of not undergoing any surgical treatment with a palliative rather than curative intent and the risks associated with this including disease progression. She expressed an understanding of these factors and wished to proceed.      Plan:  A multidisciplinary plan has been formulated for the patient:      # Breast Surgical Oncology:  - nearing the end of neoadjuvant chemotherapy, diagnostic US today shows significant decrease in size of radha and primary tumor size.   -Consents completed and signed. Discussed the risks and benefits of left partial mastectomy, wire localized and targeted axillary dissection  sentinel lymph node biopsy, possible dissection at length including estimated time for procedure, postoperative recovery, and postoperative instructions. Discussed the risks to include pain, bleeding, infection, nerve injury, numbness, seroma, skin complications including necrosis, breast asymmetry, need for re-excision, lymphedema, possible need for axillary dissection at time of surgery or at a later date, lymphocele, and scar.  Patient appears to understand and wishes to proceed.  All questions were answered.      # Medical  Oncology:  - port in place working well  - completing ACT now    #  Radiation Oncology:  -Will refer postoperatively.    # Nurse Navigation  - following.    # Lymphedema clinic  - Referral was placed previously    # Breast Imaging  - Next Screening mammogram due: 4/2025  -Staging scans without signs of metastatic disease    #Genetic Testing  - completed at OSH, reportedly negative    #General Medical work up  - has appointment with PCP in November  - needs screening colonoscopy       Moira Mccain MD  Breast Surgical Oncology

## 2024-12-19 NOTE — OP NOTE
BREAST LUMPECTOMY WITH NATALIE LOCALIZATION WITH SENTINEL NODE BIOPSY  Procedure Report    Patient Name:  Sd Man  YOB: 1965    Date of Surgery:  12/19/2024     Pre-op Diagnosis:   Malignant neoplasm of upper-outer quadrant of left breast in female, estrogen receptor positive [C50.412, Z17.0]       Post-Op Diagnosis Codes:     * Malignant neoplasm of upper-outer quadrant of left breast in female, estrogen receptor positive [C50.412, Z17.0]      Procedure(s):  Left partial mastectomy, wire localized, left sentinel lymph node biopsy, targeted axillary dissection - wire localized (intra-operative)              Staff:  Surgeon(s):  Moira Mccain MD    Assistant: Tara Ray RNFA    Anesthesia: General    Estimated Blood Loss: minimal    Implants:    Implant Name Type Inv. Item Serial No.  Lot No. LRB No. Used Action   CLIPAPPLR M/ ENDO [id number] 3/8IN MD - [id number] Implant CLIPAPPLR M/ ENDO [id number] 3/8IN MD  [employer]  DIV OF J AND J [id number] Left 1 Implanted       Specimen:          Specimens       ID Source Type Tests Collected By Collected At Frozen?    A [place] Lymph Node Tissue TISSUE PATHOLOGY EXAM   Moira Mccain MD 12/19/24 1044 Yes    Description: Left axillary sentinel lymph node #1 with clip, frozen, call OR 12 [phone]    B [place] Lymph Node Tissue TISSUE PATHOLOGY EXAM   Moira Mccain MD 12/19/24 1107 Yes    Description: Left axillary sentinel lymph node #2, call OR 12 [phone]    C Breast, Left Tissue TISSUE PATHOLOGY EXAM   Moira Mccain MD 12/19/24 1123 No    Description: Left breast partial mastectomy, ink marks margins    D Breast, Left Tissue TISSUE PATHOLOGY EXAM   Moira Mccain MD 12/19/24 1129 No    Description: Left breast superior margin, ink at true margin    E Breast, Left Tissue TISSUE PATHOLOGY EXAM   Moira Mccain MD 12/19/24 1130 No    Description: Left breast medial margin, ink at true margin    F Breast, Left Tissue TISSUE PATHOLOGY EXAM    Moira Mccain MD 12/19/24 1130 No    Description: Left breast lateral margin, ink at true margin    G Breast, Left Tissue TISSUE PATHOLOGY EXAM   Moira Mccain MD 12/19/24 1130 No    Description: Left breast inferior margin, ink at true margin    H Breast, Left Tissue TISSUE PATHOLOGY EXAM   Moira Mccain MD 12/19/24 1130 No    Description: Left breast posterior margin, ink at true margin    I Breast, Left Tissue TISSUE PATHOLOGY EXAM   Moira Mccain MD 12/19/24 1130 No    Description: Left breast anterior margin, ink at true margin                Findings: one sentinel lymph node, wire localized excision of previously placed hydromark adajcent to two lymph nodes. Total of 4 sentinel lymph nodes evaluated. Partial mastectomy with wire and clip within.     Complications: none    Description of Procedure:   The risks and benefits of the procedure were explained in detail to the patient.  Informed consent was obtained.  On the morning of surgery, she had preoperative wire/needle placed for operative localization of the breast lesion.  Radiotracer was injected prior to the start of the procedure as well to identify sentinel lymph nodes.     The patient was then taken to the operating room and placed supine on the operating room table.  Sequential compression devices were applied to the lower extremities and preoperative antibiotics administered prior to the start of the case. After the administration of adequate anesthesia, the left breast was prepped and draped in the standard surgical fashion. A time out was then performed to identify the proper patient, procedure, and location.  4 mL of lymphazurin blue dye was injected into the periareolar space of the left breast and massaged for 5 minutes.    Using ultrasound guidance the previously placed hydromark clip was visualized within the left axilla. This area was marked with a 5 cm homer needle, the wire was advanced past the needle.   Attention was drawn to the left  "axilla.  An incision was made at the base of the hair bearing region, and dissection carried into the deep axillary space.  Utilizing the gamma probe, \"hot\" lymph node(s) with increased radioactivity were identified. All blue nodes, noncolored nodes at the end of a blue channel, hot nodes and palpable nodes were removed as sentinel lymph nodes. The lymph nodes marked by the wire were also excised. In total, 4 deep sentinel lymph nodes were removed. Intraoperative faxitron evaluation showed the wire and previously placed hydromark coil clip within one of the specimens sent for axillary sentinel node evaluation. They were sent for frozen evaluation and all 4 were negative for cancer.    Using the same incision, dissection was carried through the skin to the underlying subcutaneous tissues.  Sharp dissection with the use of electrocautery was utilized to dissect down to the level of the  clip.  The region was dissected free from the rest of the breast and removed, care taken to ensure that the lesion in question was positioned near the center of the resected specimen. The specimen was then marked by ink: Green - Anterior, Blue - Inferior, Orange - lateral, Yellow - medial, Black - posterior, Red - Superior.  Intraoperative specimen radiograph confirmed the lesion and the previously placed biopsy clip were within the specimen.  Additional margins were then obtained and clips placed to mandy the resection cavity. Ink marks true margin for pathology evaluation.  The posterior margin was not taken down to pectoral fascia. Hemostasis was ensured. The underlying breast tissue was reapproximated with a 2-0 Vicryl suture.  The wound was then closed in two layers with absorbable suture (3-0 Monocryl deep dermal and 4-0 Monocryl subcuticular) and Dermabond applied. A compression dressing/bra was applied. At the completion of the operation all sponge, instrument and needle counts were correct. The patient tolerated the procedure " well and was transported to the postanesthesia care unit in stable condition.          [place] Node Biopsy for Breast Cancer - Left  Operation performed with curative intent. Yes   Tracer(s) used to identify sentinel nodes in the upfront surgery (non-neoadjuvant) setting (select all that apply). N/A   Tracer(s) used to identify sentinel nodes in the neoadjuvant setting (select all that apply). Dye and Radioactive tracer   All nodes (colored or non-colored) present at the end of a dye-filled lymphatic channel were removed. Yes   All significantly radioactive nodes were removed. Yes   All palpably suspicious nodes were removed. N/A   Biopsy-proven positive nodes marked with clips prior to chemotherapy were identified and removed. Yes          Assistant: Tara Ray RNFA  was responsible for performing the following activities: Retraction, Suction, Irrigation, Suturing, and Closing and their skilled assistance was necessary for the success of this case.    Moira Mccain MD     Date: 12/19/2024  Time: 12:31 EST

## 2024-12-19 NOTE — ANESTHESIA PROCEDURE NOTES
Airway  Urgency: elective    Date/Time: 12/19/2024 9:55 AM  End Time:12/19/2024 9:55 AM  Airway not difficult    General Information and Staff    Patient location during procedure: OR  Anesthesiologist: Abdifatah Calderon MD    Indications and Patient Condition  Indications for airway management: airway protection    Preoxygenated: yes  Mask difficulty assessment: 1 - vent by mask    Final Airway Details  Final airway type: endotracheal airway      Successful airway: ETT  Cuffed: yes   Successful intubation technique: direct laryngoscopy  Facilitating devices/methods: intubating stylet and cricoid pressure  Endotracheal tube insertion site: oral  Blade: Mike  Blade size: 3  ETT size (mm): 7.0  Cormack-Lehane Classification: grade IIb - view of arytenoids or posterior of glottis only  Placement verified by: capnometry and palpation of cuff   Number of attempts at approach: 1  Assessment: lips, teeth, and gum same as pre-op and atraumatic intubation

## 2024-12-20 ENCOUNTER — TELEPHONE (OUTPATIENT)
Dept: SURGERY | Facility: CLINIC | Age: 59
End: 2024-12-20
Payer: COMMERCIAL

## 2024-12-20 LAB
CYTO UR: NORMAL
LAB AP CASE REPORT: NORMAL
LAB AP DIAGNOSIS COMMENT: NORMAL
LAB AP SPECIAL STAINS: NORMAL
LAB AP SYNOPTIC CHECKLIST: NORMAL
Lab: NORMAL
PATH REPORT.FINAL DX SPEC: NORMAL
PATH REPORT.GROSS SPEC: NORMAL

## 2024-12-20 NOTE — TELEPHONE ENCOUNTER
Called Sd Man to discuss pathology results.  All questions answered.  I will follow up with her in clinic as scheduled.      Moira Mccain MD

## 2024-12-30 ENCOUNTER — PRIOR AUTHORIZATION (OUTPATIENT)
Dept: ONCOLOGY | Facility: HOSPITAL | Age: 59
End: 2024-12-30
Payer: COMMERCIAL

## 2024-12-30 NOTE — TELEPHONE ENCOUNTER
Outcome  Approved today by [facility] 2017  Your PA request has been approved. Additional information will be provided in the approval communication. (Message [id number])  Authorization Expiration Date: 6/29/2025  Drug  Ondansetron HCl 8MG tablets  ePA cloud logo  Form  [facility] Electronic PA Form (2017 [employer])  Original Claim Info  76

## 2025-01-02 ENCOUNTER — OFFICE VISIT (OUTPATIENT)
Dept: ONCOLOGY | Facility: CLINIC | Age: 60
End: 2025-01-02
Payer: COMMERCIAL

## 2025-01-02 ENCOUNTER — INFUSION (OUTPATIENT)
Dept: ONCOLOGY | Facility: HOSPITAL | Age: 60
End: 2025-01-02
Payer: COMMERCIAL

## 2025-01-02 VITALS
RESPIRATION RATE: 16 BRPM | TEMPERATURE: 98.3 F | HEIGHT: 64 IN | HEART RATE: 111 BPM | WEIGHT: 237.6 LBS | BODY MASS INDEX: 40.56 KG/M2 | DIASTOLIC BLOOD PRESSURE: 92 MMHG | SYSTOLIC BLOOD PRESSURE: 137 MMHG | OXYGEN SATURATION: 96 %

## 2025-01-02 DIAGNOSIS — Z17.0 MALIGNANT NEOPLASM OF UPPER-OUTER QUADRANT OF LEFT BREAST IN FEMALE, ESTROGEN RECEPTOR POSITIVE: ICD-10-CM

## 2025-01-02 DIAGNOSIS — Z45.2 ENCOUNTER FOR FITTING AND ADJUSTMENT OF VASCULAR CATHETER: Primary | ICD-10-CM

## 2025-01-02 DIAGNOSIS — Z17.0 MALIGNANT NEOPLASM OF UPPER-OUTER QUADRANT OF LEFT BREAST IN FEMALE, ESTROGEN RECEPTOR POSITIVE: Primary | ICD-10-CM

## 2025-01-02 DIAGNOSIS — C50.412 MALIGNANT NEOPLASM OF UPPER-OUTER QUADRANT OF LEFT BREAST IN FEMALE, ESTROGEN RECEPTOR POSITIVE: ICD-10-CM

## 2025-01-02 DIAGNOSIS — C50.412 MALIGNANT NEOPLASM OF UPPER-OUTER QUADRANT OF LEFT BREAST IN FEMALE, ESTROGEN RECEPTOR POSITIVE: Primary | ICD-10-CM

## 2025-01-02 LAB
ALBUMIN SERPL-MCNC: 4.4 G/DL (ref 3.5–5.2)
ALBUMIN/GLOB SERPL: 2 G/DL
ALP SERPL-CCNC: 89 U/L (ref 39–117)
ALT SERPL W P-5'-P-CCNC: 38 U/L (ref 1–33)
ANION GAP SERPL CALCULATED.3IONS-SCNC: 12 MMOL/L (ref 5–15)
AST SERPL-CCNC: 28 U/L (ref 1–32)
BASOPHILS # BLD AUTO: 0.04 10*3/MM3 (ref 0–0.2)
BASOPHILS NFR BLD AUTO: 0.7 % (ref 0–1.5)
BILIRUB SERPL-MCNC: 0.5 MG/DL (ref 0–1.2)
BUN SERPL-MCNC: 18 MG/DL (ref 6–20)
BUN/CREAT SERPL: 20 (ref 7–25)
CALCIUM SPEC-SCNC: 9.3 MG/DL (ref 8.6–10.5)
CHLORIDE SERPL-SCNC: 106 MMOL/L (ref 98–107)
CO2 SERPL-SCNC: 24 MMOL/L (ref 22–29)
CREAT SERPL-MCNC: 0.9 MG/DL (ref 0.57–1)
DEPRECATED RDW RBC AUTO: 45.9 FL (ref 37–54)
EGFRCR SERPLBLD CKD-EPI 2021: 74.7 ML/MIN/1.73
EOSINOPHIL # BLD AUTO: 0.17 10*3/MM3 (ref 0–0.4)
EOSINOPHIL NFR BLD AUTO: 2.9 % (ref 0.3–6.2)
ERYTHROCYTE [DISTWIDTH] IN BLOOD BY AUTOMATED COUNT: 13 % (ref 12.3–15.4)
GLOBULIN UR ELPH-MCNC: 2.2 GM/DL
GLUCOSE SERPL-MCNC: 149 MG/DL (ref 65–99)
HCT VFR BLD AUTO: 41.9 % (ref 34–46.6)
HGB BLD-MCNC: 13.6 G/DL (ref 12–15.9)
IMM GRANULOCYTES # BLD AUTO: 0.01 10*3/MM3 (ref 0–0.05)
IMM GRANULOCYTES NFR BLD AUTO: 0.2 % (ref 0–0.5)
LYMPHOCYTES # BLD AUTO: 2.16 10*3/MM3 (ref 0.7–3.1)
LYMPHOCYTES NFR BLD AUTO: 36.5 % (ref 19.6–45.3)
MCH RBC QN AUTO: 31.1 PG (ref 26.6–33)
MCHC RBC AUTO-ENTMCNC: 32.5 G/DL (ref 31.5–35.7)
MCV RBC AUTO: 95.9 FL (ref 79–97)
MONOCYTES # BLD AUTO: 0.62 10*3/MM3 (ref 0.1–0.9)
MONOCYTES NFR BLD AUTO: 10.5 % (ref 5–12)
NEUTROPHILS NFR BLD AUTO: 2.92 10*3/MM3 (ref 1.7–7)
NEUTROPHILS NFR BLD AUTO: 49.2 % (ref 42.7–76)
NRBC BLD AUTO-RTO: 0 /100 WBC (ref 0–0.2)
PLATELET # BLD AUTO: 323 10*3/MM3 (ref 140–450)
PMV BLD AUTO: 9.4 FL (ref 6–12)
POTASSIUM SERPL-SCNC: 3.7 MMOL/L (ref 3.5–5.2)
PROT SERPL-MCNC: 6.6 G/DL (ref 6–8.5)
RBC # BLD AUTO: 4.37 10*6/MM3 (ref 3.77–5.28)
SODIUM SERPL-SCNC: 142 MMOL/L (ref 136–145)
WBC NRBC COR # BLD AUTO: 5.92 10*3/MM3 (ref 3.4–10.8)

## 2025-01-02 PROCEDURE — 36591 DRAW BLOOD OFF VENOUS DEVICE: CPT

## 2025-01-02 PROCEDURE — 80053 COMPREHEN METABOLIC PANEL: CPT | Performed by: INTERNAL MEDICINE

## 2025-01-02 PROCEDURE — 25010000002 HEPARIN LOCK FLUSH PER 10 UNITS: Performed by: INTERNAL MEDICINE

## 2025-01-02 PROCEDURE — 85025 COMPLETE CBC W/AUTO DIFF WBC: CPT | Performed by: INTERNAL MEDICINE

## 2025-01-02 RX ORDER — SODIUM CHLORIDE 0.9 % (FLUSH) 0.9 %
10 SYRINGE (ML) INJECTION AS NEEDED
OUTPATIENT
Start: 2025-01-02

## 2025-01-02 RX ORDER — HEPARIN SODIUM (PORCINE) LOCK FLUSH IV SOLN 100 UNIT/ML 100 UNIT/ML
500 SOLUTION INTRAVENOUS AS NEEDED
OUTPATIENT
Start: 2025-01-02

## 2025-01-02 RX ORDER — SODIUM CHLORIDE 0.9 % (FLUSH) 0.9 %
10 SYRINGE (ML) INJECTION AS NEEDED
Status: DISCONTINUED | OUTPATIENT
Start: 2025-01-02 | End: 2025-01-02 | Stop reason: HOSPADM

## 2025-01-02 RX ORDER — ANASTROZOLE 1 MG/1
1 TABLET ORAL DAILY
Qty: 30 TABLET | Refills: 5 | Status: SHIPPED | OUTPATIENT
Start: 2025-01-02 | End: 2025-02-01

## 2025-01-02 RX ORDER — HEPARIN SODIUM (PORCINE) LOCK FLUSH IV SOLN 100 UNIT/ML 100 UNIT/ML
500 SOLUTION INTRAVENOUS AS NEEDED
Status: DISCONTINUED | OUTPATIENT
Start: 2025-01-02 | End: 2025-01-02 | Stop reason: HOSPADM

## 2025-01-02 RX ADMIN — Medication 10 ML: at 08:28

## 2025-01-02 RX ADMIN — Medication 500 UNITS: at 08:28

## 2025-01-02 NOTE — PROGRESS NOTES
Subjective   Sd Man is a 59 y.o. female.  Referred by Dr. Mccain for left breast invasive ductal carcinoma    History of Present Illness     Patient is a 59-year-old postmenopausal lady who presented with a palpable abnormality of the left breast She initially felt and March 2024.  Patient feels like the mass has increased in size since she initially palpated it.  She brought this to the attention of her gynecologist who ordered diagnostic imaging and further workup.    Patient denies any previous abnormal mammograms or breast biopsies.    She has a diagnosis of PCOS and possible diabetes.  Her last normal mammogram prior to the most recent one was in 2015.    Patient reports extremely stressful situation at home with 3 of her daughters having had mental health issues and her ex- with bipolar disorder.  She has been dealing with a lot of stress since 2017.  She currently works as a .  She was working at [employer] in the past but no longer there.    Patient currently sees [facility] and on medications for anxiety and depression.  She also sees a counselor on a regular basis and has an appointment coming up with them.    Her support system is mainly her parents who live in [place] and also she has a few friends here.  During the summer if she were to do chemotherapy she prefers to get that done in [place] and subsequently transferred care back to us.    4/9/2024-bilateral diagnostic mammogram  Breast that heterogeneously dense.  Finding 1 high density irregular mass measuring 20 mm with indistinct margins at 2:00 in the left breast, 11 cm from the nipple.  This indicates the palpable lump in the left breast.  No suspicious abnormalities of the right breast.    Left breast ultrasound  Finding 1.irregular solid mass measuring 20 x 17 x 20 mm in the posterior one third left breast, 2:00, 11 cm from the nipple.  Finding 2.lymph node measures 20 x 9 x 13 mm on the left  breast.  4 mm cortical thickness noted.    Impression  Finding 1.solid mass in the left breast is suspicious, ultrasound-guided biopsy recommended  Finding 2.lymph node in the left breast is suspicious, FNA and possible spring-loaded or limited core biopsy is recommended.    4/16/2024-left breast ultrasound-guided biopsy  1.left breast 2:00-invasive ductal carcinoma  Grade 3  Invasive carcinoma involves multiple tissue cores and measures up to 14 mm  ER +91 to 100% strong  NM +31 to 40% moderate  HER2 negative, 1+  Ki-67 95%    2.lymph node of the left axilla with free-floating fragments of carcinoma, suspicious for metastasis.    5/10/2024-bilateral breast MRI  Biopsy-proven malignancy in the left breast measuring 2.7 cm at 3:00.  No other suspicious findings are seen.  Left axilla lymph node has undergone prior biopsy and shows diffuse cortical thickening suspicious for left axilla lymphadenopathy.  No suspicious abnormalities of the right breast.    5/23/2024-CT of the chest abdomen and pelvis  1.bone island in the right iliac wing, subchondral cyst in the left acetabulum.  2.2 0.4 cm rounded density in the lateral aspect of the left breast with a surgical marker.  2.4 cm left axillary lymph node.  3.12 millimeters subcapsular cyst in the left lobe of the liver, attenuation compatible with serous fluid.  Second likely cyst within the right lobe of the liver measuring 15 mm.  Attenuation compatible with slightly complex fluid.  No grossly suspicious liver lesion identified.  Further imaging of the liver with an ultrasound recommended.  4.no clear evidence of metastatic disease.    5/23/2024-bone scan without any evidence of metastatic disease    Patient presents today to discuss neoadjuvant chemotherapy given the high-grade and high Ki-67 of the tumor although the tumor is strongly ER positive.    She is extremely reluctant to the idea of receiving chemotherapy as she is concerned about feeling sick and she wants  to work for at least 1 year.    Oncotype DX recurrence score on the core biopsy returned at 73 with a greater than 32% risk of distant metastasis with AI or tamoxifen alone.  Significant benefit from chemotherapy noted.    Patient had moved to [place] after the initial diagnosis and initiated neoadjuvant chemotherapy with dose dense Adriamycin and Cytoxan under the care of  at [facility] oncology in [place], [place].  She completed the 4 cycles of dose dense Adriamycin and Cytoxan and currently receiving weekly Taxol.  Chemotherapy initiated on 7/12/2024.  Patient has not had any chemotherapy dose delays or interruptions.    Interval history  Sd returns today for follow-up.  She has completed neoadjuvant chemotherapy and underwent a left breast lumpectomy on 12/19/2024.  Presents today to discuss the pathology and further recommendations on adjuvant therapy.  She tells me that she would prefer to have radiation performed at [place] due to ongoing family/life issues.  She is going through a lot of stress at home due to issues with her daughter as well as her job.  She is requesting ongoing relief.  Otherwise she is recovering well from surgery and has some discomfort at the site of the incision but the incision is healing well.  No other new complaints at this time.      The following portions of the patient's history were reviewed and updated as appropriate: allergies, current medications, past family history, past medical history, past social history, past surgical history, and problem list.    Past Medical History:   Diagnosis Date    ADHD     Anxiety     Breast cancer     Depression     Hypoxemia associated with sleep     Insulin resistance     Metabolic syndrome X     Obesity 11/11/2012    CHELO on CPAP 06/17/2024    Home sleep study.  Weight 242 pounds.  Moderate CHELO with AHI 19.4 events per hour.  Low oxygen saturation 66% and sleep-related hypoxia abnormal for 43.4 minutes.    PCOS (polycystic  "ovarian syndrome)     PTSD (post-traumatic stress disorder)         Past Surgical History:   Procedure Laterality Date    BREAST LUMPECTOMY WITH SENTINEL NODE BIOPSY Left 2024    Procedure: Left partial mastectomy, wire localized, left sentinel lymph node biopsy, targeted axillary dissection - wire localized (intra-operative);  Surgeon: Moira Mccain MD;  [facility] OR;  Service: General;  Laterality: Left;    COLONOSCOPY      OOPHORECTOMY Bilateral 2014    VENOUS ACCESS DEVICE (PORT) INSERTION Right 2024    Procedure: INSERTION VENOUS ACCESS DEVICE;  Surgeon: Moira Mccain MD;  Location: [facility] MAIN OR;  Service: General;  Laterality: Right;        Family History   Problem Relation Age of Onset    Melanoma Mother 75        alive currently 79    Atrial fibrillation Father     Hypertension Father     Malig Hyperthermia Neg Hx         Social History     Socioeconomic History    Marital status:    Tobacco Use    Smoking status: Never     Passive exposure: Never    Smokeless tobacco: Never   Vaping Use    Vaping status: Never Used   Substance and Sexual Activity    Alcohol use: Never    Drug use: Never    Sexual activity: Not Currently        OB History    No obstetric history on file.      Age at menarche-14  Age at first live childbirth-32   3 para 3  0  Age at menopause-47  Breast-feeding for 2 to 3 years  Oral contraceptive pill use none  Hormone replacement therapy for 2 years    Allergies   Allergen Reactions    Pepcid [Famotidine] Nausea And Vomiting          Objective   Resp. rate 16, height 162.6 cm (64\").     Physical Exam  Vitals reviewed.   Constitutional:       Appearance: Normal appearance. She is obese.   HENT:      Nose: Nose normal.      Mouth/Throat:      Pharynx: Oropharynx is clear.   Eyes:      Conjunctiva/sclera: Conjunctivae normal.   Cardiovascular:      Rate and Rhythm: Normal rate and regular rhythm.   Pulmonary:      Effort: Pulmonary effort " is normal.   Abdominal:      General: Abdomen is flat.   Musculoskeletal:         General: Normal range of motion.      Cervical back: Normal range of motion.   Skin:     General: Skin is warm and dry.   Neurological:      General: No focal deficit present.      Mental Status: She is alert and oriented to person, place, and time.   Psychiatric:         Mood and Affect: Mood normal.         Behavior: Behavior normal.         Thought Content: Thought content normal.         Judgment: Judgment normal.         Breast exam:  Right breast: Appears normal on inspection.  No palpable abnormalities of the right breast.  Left breast: Status post left breast lumpectomy with incision in the upper outer quadrant healing well.    I have reexamined the patient and the results are consistent with the previously documented exam. Kristan Chawla MD      Labs:                        XR Foot 3+ View Left    Result Date: 12/5/2024  Ordering physician's impression is located in the Encounter Note dated 12/05/24. X-ray performed in the DR room.         Assessment & Plan   *Left breast invasive ductal carcinoma  Clinical T2 N1a M0, Anatomic stage IIb, prognostic stage IIa.  The tumor is grade 3, Ki-67 95%, ER +91 to 100% strong, NV +31 to 40% moderate.  Oncotype DX recurrence score of the tumor noted to be 73 with a greater than 32% risk of distant metastasis with AI or tamoxifen alone.  Significant benefit from chemotherapy.  Initiated dose dense Adriamycin and Cytoxan on 7/12/2024 at [facility] under the care of Dr. Cash  Noted to have a significant response in the breast after initiation of neoadjuvant chemotherapy.  11/21/2024-week 12 of Taxol.  Taxol dose will be reduced by 20% due to neuropathy in the bottom of her feet.  12/19/2024-Left breast lumpectomy with sentinel lymph node biopsy-noted to have a pathological complete response with 4 benign sentinel lymph nodes.  Treatment effect noted.  mL pain grade 5.  RCB  0.  Given that she has a pathological complete response the benefit of adjuvant CDK 4 6 inhibitors is uncertain.  Based on the preop pathology CDK 4 6 inhibitors will be indicated but given the excellent response I do not feel strongly about either Verzenio or Ribociclib.  Given that the tumor was ER/MO positive we will proceed with adjuvant endocrine therapy with anastrozole 1 mg p.o. daily.  Adverse effects of anastrozole including but not limited to hot flashes, mood changes, fatigue, insomnia, decrease in bone density, cardiovascular risk, sexual dysfunction discussed.  Anastrozole can be started after completion of adjuvant radiation.  Referral to radiation oncology placed today    *Bone health  DEXA performed in October 2024 at [place].  Will need to obtain results.  Per patient report DEXA was normal.  We discussed adjuvant Zometa after completion of adjuvant radiation.    *Anemia  11/22/2024-hemoglobin 11.7  Continue to monitor    *Severe anxiety and suicidal thoughts  She is a current patient of [facility].  She sees a psychiatric nurse practitioner as well as a counselor and has an appointment with them.  Currently on Trintellix 10 mg.  Severe stressors as reported in the HPI.  11/7/2024: Reports increased stress and anxiety related to the recent election.  Mood stable    *Obesity  Class III  BMI 40.8  Recommend moderate intensity exercises as well as dietary alterations.  Patient has lost some weight recently due to ongoing nausea which she attributes to stress.  She has been using Zofran to help with the nausea    *Elevated Liver enzymes  11/26/2024 LFTs stable with the ALT of 46, AST 33    *Possible diabetes-she will require follow-up with her primary care physician for management of the same.  Has an appointment with new PCP.    *Cardiac health-she has seen Dr. 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   MD in 8 weeks    45 minutes total spent on the encounter on the same day.

## 2025-01-07 ENCOUNTER — TELEPHONE (OUTPATIENT)
Dept: SURGERY | Facility: CLINIC | Age: 60
End: 2025-01-07
Payer: COMMERCIAL

## 2025-01-07 ENCOUNTER — OFFICE VISIT (OUTPATIENT)
Dept: SURGERY | Facility: CLINIC | Age: 60
End: 2025-01-07
Payer: COMMERCIAL

## 2025-01-07 ENCOUNTER — PREP FOR SURGERY (OUTPATIENT)
Dept: OTHER | Facility: HOSPITAL | Age: 60
End: 2025-01-07
Payer: COMMERCIAL

## 2025-01-07 VITALS
OXYGEN SATURATION: 97 % | HEART RATE: 113 BPM | DIASTOLIC BLOOD PRESSURE: 88 MMHG | WEIGHT: 237 LBS | BODY MASS INDEX: 40.46 KG/M2 | HEIGHT: 64 IN | SYSTOLIC BLOOD PRESSURE: 146 MMHG

## 2025-01-07 DIAGNOSIS — C50.412 MALIGNANT NEOPLASM OF UPPER-OUTER QUADRANT OF LEFT BREAST IN FEMALE, ESTROGEN RECEPTOR POSITIVE: Primary | ICD-10-CM

## 2025-01-07 DIAGNOSIS — Z12.31 BREAST CANCER SCREENING BY MAMMOGRAM: ICD-10-CM

## 2025-01-07 DIAGNOSIS — Z17.0 MALIGNANT NEOPLASM OF UPPER-OUTER QUADRANT OF LEFT BREAST IN FEMALE, ESTROGEN RECEPTOR POSITIVE: Primary | ICD-10-CM

## 2025-01-07 DIAGNOSIS — Z95.828 PORT-A-CATH IN PLACE: ICD-10-CM

## 2025-01-07 PROCEDURE — 99024 POSTOP FOLLOW-UP VISIT: CPT | Performed by: STUDENT IN AN ORGANIZED HEALTH CARE EDUCATION/TRAINING PROGRAM

## 2025-01-07 NOTE — TELEPHONE ENCOUNTER
Called pt per Dr. Mccain and offered her an earlier appt today. She is going to evaluate her car and weather situation to see if she can get her car out. Said she will call back shortly.

## 2025-01-07 NOTE — TELEPHONE ENCOUNTER
Called Pt back to see if she is able to come sooner, and she said she will have her daughter bring her. Leaving house shortly.

## 2025-01-07 NOTE — PROGRESS NOTES
Breast Surgical Oncology Post-Op Visit    Surgery: Left partial mastectomy, sentinel lymph node biopsy  Subjective: No acute issues. Denies f/c/wnd issues. Intermittent use of pain medication.     O:  Vitals:    01/07/25 1233   BP: 146/88   Pulse: 113   SpO2: 97%     Breast/axillary incision:  healing well without evidence of infection or significant seroma    Pathology:   1.  Lymph node, left axillary sentinel lymph node #1, excision (2 mm slices): 3 benign lymph nodes with no evidence of fibrosis, biopsy site changes or treatment effect.     2.  Lymph node, left axillary sentinel lymph node #2, excision (2 mm slices).  1 benign lymph node with no evidence of fibrosis, biopsy site changes or treatment effect     3.  Breast, left, partial mastectomy: Benign breast tissue with               -A.  Treatment effect changes identified               -B.  Clip identified               -C.  No residual tumor identified               -D.  Prajapati-Castro grading system: Grade 5               -E.  Residual cancer burden = 0, Residual cancer burden class RCB-0 (pCR).     4.  Breast, left, true superior margin, excision: 5 mm of additional benign breast tissue (superior margin) with                -A.  Margins free of atypia, in situ and invasive disease     5.  Breast, left,  true medial margin, excision: 10 mm of additional benign breast tissue (medial margin) with               -A.  Margins free of atypia, in situ and invasive disease     6.  Breast, left, true lateral margin, excision: 6 mm of additional benign breast tissue (lateral margin) with               -A.  Margins free of atypia, in situ and invasive disease     7.  Breast, left, true inferior margin, excision: 12 mm of additional benign breast tissue (inferior margin) with               -A.  Margins free of atypia, in situ and invasive disease     8.  Breast, left, true posterior margin, excision: 8 mm of additional benign breast tissue (posterior margin) with                -A.  Margins free of atypia, in situ and invasive disease     9.  Breast, left, true anterior margin, excision: 8 mm of additional benign breast tissue (anterior margin) with               -A.  Margins free of atypia, in situ and invasive disease    Assessment: s/p left partial mastectomy and sentinel lymph node biopsy for clinical stage IIB now with a pathologic complete response ([id number], ER+/OR+/Her2-) left invasive ductal breast cancer recovering well.  Discussed pathology at length as well as planned/possible adjuvant treatments.  All questions answered.    Plan:   - RTC 6 months, due for screening mammogram in April.  This will likely be soon after the completion of her adjuvant radiation.  [facility] follow-up with me in June  - Med Onc -established with Dr. Chawla, plans for adjuvant AI.  Due to path CR no plans for abemaciclib or Ribociclib  - Rad Onc -completed neoadjuvant chemotherapy with medical oncology in [place].  She plans to complete her adjuvant radiation at the same cancer center.  Is working on establishing an appointment and would likely start treatment in early February.  -Interested in having her port removed, will work on scheduling that prior to her leaving town for radiation  - Lymphedema clinic referral        Moira Mccain MD  Breast Surgical Oncology

## 2025-01-07 NOTE — LETTER
January 7, 2025     Nely Jesus MD  [address]  [address]  [facility] [address]    Patient: Sd Man   YOB: 1965   Date of Visit: 1/7/2025     Dear Nely Jesus MD:       Thank you for referring Sd Man to me for evaluation. Below are the relevant portions of my assessment and plan of care.    If you have questions, please do not hesitate to call me. I look forward to following Sd along with you.         Sincerely,        Moira Mccain MD        CC: No Recipients    Moira Mccain MD  01/07/25 1337  Sign when Signing Visit  Breast Surgical Oncology Post-Op Visit    Surgery: Left partial mastectomy, sentinel lymph node biopsy  Subjective: No acute issues. Denies f/c/wnd issues. Intermittent use of pain medication.     O:  Vitals:    01/07/25 1233   BP: 146/88   Pulse: 113   SpO2: 97%     Breast/axillary incision:  healing well without evidence of infection or significant seroma    Pathology:   1.  Lymph node, left axillary sentinel lymph node #1, excision (2 mm slices): 3 benign lymph nodes with no evidence of fibrosis, biopsy site changes or treatment effect.     2.  Lymph node, left axillary sentinel lymph node #2, excision (2 mm slices).  1 benign lymph node with no evidence of fibrosis, biopsy site changes or treatment effect     3.  Breast, left, partial mastectomy: Benign breast tissue with               -A.  Treatment effect changes identified               -B.  Clip identified               -C.  No residual tumor identified               -D.  Prajapati-Castro grading system: Grade 5               -E.  Residual cancer burden = 0, Residual cancer burden class RCB-0 (pCR).     4.  Breast, left, true superior margin, excision: 5 mm of additional benign breast tissue (superior margin) with                -A.  Margins free of atypia, in situ and invasive disease     5.  Breast, left,  true medial margin, excision: 10 mm of additional benign breast tissue (medial margin) with                -A.  Margins free of atypia, in situ and invasive disease     6.  Breast, left, true lateral margin, excision: 6 mm of additional benign breast tissue (lateral margin) with               -A.  Margins free of atypia, in situ and invasive disease     7.  Breast, left, true inferior margin, excision: 12 mm of additional benign breast tissue (inferior margin) with               -A.  Margins free of atypia, in situ and invasive disease     8.  Breast, left, true posterior margin, excision: 8 mm of additional benign breast tissue (posterior margin) with               -A.  Margins free of atypia, in situ and invasive disease     9.  Breast, left, true anterior margin, excision: 8 mm of additional benign breast tissue (anterior margin) with               -A.  Margins free of atypia, in situ and invasive disease    Assessment: s/p left partial mastectomy and sentinel lymph node biopsy for clinical stage IIB now with a pathologic complete response ([id number], ER+/VA+/Her2-) left invasive ductal breast cancer recovering well.  Discussed pathology at length as well as planned/possible adjuvant treatments.  All questions answered.    Plan:   - RTC 6 months, due for screening mammogram in April.  This will likely be soon after the completion of her adjuvant radiation.  Will plan [facility] with follow-up with me in June  - Med Onc -established with Dr. Chawla, plans for adjuvant AI.  Due to path CR no plans for abemaciclib or Ribociclib  - Rad Onc -completed neoadjuvant chemotherapy with medical oncology in [place].  She plans to complete her adjuvant radiation at the same cancer center.  Is working on establishing an appointment and would likely start treatment in early February.  -Interested in having her port removed, will work on scheduling that prior to her leaving town for radiation  - Lymphedema clinic referral        Moira Mccain MD  Breast Surgical  Oncology

## 2025-01-08 ENCOUNTER — TELEPHONE (OUTPATIENT)
Dept: ONCOLOGY | Facility: CLINIC | Age: 60
End: 2025-01-08
Payer: COMMERCIAL

## 2025-01-08 ENCOUNTER — TELEPHONE (OUTPATIENT)
Dept: SURGERY | Facility: CLINIC | Age: 60
End: 2025-01-08
Payer: COMMERCIAL

## 2025-01-08 NOTE — TELEPHONE ENCOUNTER
Informed Patient of Mammogram scheduled at [facility] for 5/27/2025 at 9:30 am. Her follow up will be with Dr. Mccain on 6/3/2025 at 9:30 am.     Advised Patient we are still working on scheduling surgery and will call when we have more information.     IRINA

## 2025-01-08 NOTE — TELEPHONE ENCOUNTER
Caller: Sd Man    Relationship to patient: Self    Best call back number: [phone]    Chief complaint: INQUIRING IF SHE CAN SCHEDULE AN APPT. FOR AFTER 3/24/25, SHE WANTS TO KEEP HER APPT. FOR 2/25/25 IN CASE THINGS DO NOT WORK OUT FOR HER RADIATION THAT SHE IS HAVING DONE IN [place] BUT WANTS TO HAVE AN APPT. SET UP IF IT DOES & WILL THEN CANCEL THE 2/25/25 APPTS., SHE WILL KNOW FOR SURE THE FIRST WEEK OF FEBRUARY.    Type of visit: VITALS ONLY & F/U 1    Requested date: AFTER 3/24/25

## 2025-01-13 ENCOUNTER — TELEPHONE (OUTPATIENT)
Dept: SURGERY | Facility: CLINIC | Age: 60
End: 2025-01-13
Payer: COMMERCIAL

## 2025-01-13 NOTE — TELEPHONE ENCOUNTER
called Patient and left a voicemail. Lymphedema clinic eval appt set up for 1/22/2025 at 2:45 with Glenna Martínez PT. Also left clinic address and phone number in case the appt time does not work. [phone]

## 2025-01-13 NOTE — TELEPHONE ENCOUNTER
Informed patient that surgery will be 1/24/2025 arrive at 1 pm start time around 3 pm. Pre admission testing will be 1/17/2024 at 10:30 am.    Patient confirmed understanding of all dates and times.  EAS

## 2025-01-15 ENCOUNTER — TELEPHONE (OUTPATIENT)
Dept: ONCOLOGY | Facility: CLINIC | Age: 60
End: 2025-01-15

## 2025-01-15 NOTE — TELEPHONE ENCOUNTER
Caller: Sd Man    Relationship: Self    Best call back number: [phone]    What is the best time to reach you: ANYTIME    Who are you requesting to speak with (clinical staff, provider,  specific staff member): TONY/MARA JARA         What was the call regarding:     HAD GIVEN TREY JARA PAPER WORK WHEN WAS IN OFFICE 01/02      CALLED LEAVE WITH [facility] AND THEY STILL HAVE NOT RECEIVED THE PAPER WORK ON THIS CALLING TO FOLLOW UP ON THIS.     AND NEEDING THIS COMPLETED BY 1/17     Is it okay if the provider responds through MyChart: NO

## 2025-01-16 ENCOUNTER — OFFICE VISIT (OUTPATIENT)
Dept: ORTHOPEDIC SURGERY | Facility: CLINIC | Age: 60
End: 2025-01-16
Payer: COMMERCIAL

## 2025-01-16 VITALS — WEIGHT: 237 LBS | TEMPERATURE: 96.4 F | HEIGHT: 64 IN | BODY MASS INDEX: 40.46 KG/M2

## 2025-01-16 DIAGNOSIS — M20.12 HALLUX VALGUS OF LEFT FOOT: Primary | ICD-10-CM

## 2025-01-16 DIAGNOSIS — M19.072 ARTHRITIS OF FIRST METATARSOPHALANGEAL (MTP) JOINT OF LEFT FOOT: ICD-10-CM

## 2025-01-16 DIAGNOSIS — M92.62 HAGLUND'S DEFORMITY OF LEFT HEEL: ICD-10-CM

## 2025-01-16 DIAGNOSIS — S92.515D CLOSED NONDISPLACED FRACTURE OF PROXIMAL PHALANX OF LESSER TOE OF LEFT FOOT WITH ROUTINE HEALING, SUBSEQUENT ENCOUNTER: ICD-10-CM

## 2025-01-16 DIAGNOSIS — M65.28 CALCIFIC ACHILLES TENDONITIS: ICD-10-CM

## 2025-01-16 NOTE — PROGRESS NOTES
"Foot Follow Up      Patient: Sd Man    YOB: 1965 59 y.o. female    Chief Complaints: Foot does not hurt at all    History of Present Illness:Patient was seen on 12/5/2024 reporting that she injured her left fifth toe on 11/21/2024 when she was carrying an empty suitcase down some steps missed a step and caught her fifth toe.  She then seen in urgent care and found to have a left fifth proximal phalanx fracture     Patient ported mild pain in the left fifth toe but no other pain in the foot or ankle.  She had been using crocs as those were the only shoes that were comfortable for her.     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    Reviewed with her I would not recommend any surgical treatment for the fifth toe fracture.  She was fit with a silicone spacer to use between the fourth and fifth toes and instructed on elizabeth wrapping.  She is provided a postoperative shoe to use for at least the next 2 to 3 weeks and if comfortable can try getting back into her crocs.  She was counseled avoid anti-inflammatories so as to minimize bone healing intubation and could not use Tylenol due to liver issues.  She reports she was having surgery for breast cancer on 12/19/2024.    Patient is seen back today reporting that her left fifth toe does not hurt \"at all\".  She quit elizabeth wrapping about 3 weeks ago and has been using regular shoes including stay boots with no pain in the left fifth toe.  Pain is rated 0 out of 10  HPI    ROS: No foot pain  Past Medical History:   Diagnosis Date    ADHD     Anxiety     Breast cancer     Depression     Hypoxemia associated with sleep     Insulin resistance     Metabolic syndrome X     Obesity 11/11/2012    CHELO on CPAP 06/17/2024    Home sleep study.  Weight 242 pounds.  Moderate CHELO with AHI 19.4 events per hour.  Low oxygen saturation 66% and sleep-related hypoxia " "abnormal for 43.4 minutes.    PCOS (polycystic ovarian syndrome)     PTSD (post-traumatic stress disorder)      Physical Exam:   Vitals:    01/16/25 1524   Temp: 96.4 °F (35.8 °C)   TempSrc: Temporal   Weight: 108 kg (237 lb)   Height: 162.6 cm (64\")   PainSc: 0-No pain   PainLoc: Foot     Well developed with normal mood.  Nonantalgic gait in crocs.  There is slight swelling to left fifth toe but no tenderness at all to palpation and no clinical deformity no instability.      Radiology: 3 views left foot ordered evaluate fracture reviewed and compared to previous x-rays these show no change in alignment of the fifth toe proximal phalangeal fracture which is still somewhat evident but without disc placement.  There does appear to be some callus formation.      Assessment/Plan:  1.  Left fifth toe proximal phalanx fracture  2.  Left hallux valgus with mild first MTP arthritis   3.  Left Achilles insertional calcifications with Jennifer deformity    Reviewed treatment going forward and reviewed that x-rays show the fracture does not appear to be completely healed but she is not at all symptomatic and would not recommend further workup or change in activity.    Recommend use of sturdy accommodative shoes and she advance activity slowly as tolerated.    Fortunately she had a good response to her breast cancer surgery and overall seem to be doing well.    If she has any recurrent persistent symptoms she will follow-up otherwise by mutual agreement I will see her back as needed  "

## 2025-01-17 ENCOUNTER — PATIENT OUTREACH (OUTPATIENT)
Dept: OTHER | Facility: HOSPITAL | Age: 60
End: 2025-01-17
Payer: COMMERCIAL

## 2025-01-17 NOTE — PROGRESS NOTES
Called Ms. Man to see how she was doing. Left a message with my contact information and asked her to call back at her convenience. Will also mail survivorship information.

## 2025-01-19 ENCOUNTER — HOSPITAL ENCOUNTER (OUTPATIENT)
Dept: SLEEP MEDICINE | Facility: HOSPITAL | Age: 60
Discharge: HOME | End: 2025-01-19
Admitting: INTERNAL MEDICINE
Payer: COMMERCIAL

## 2025-01-19 DIAGNOSIS — G47.33 OSA ON CPAP: ICD-10-CM

## 2025-01-19 PROCEDURE — 95811 POLYSOM 6/>YRS CPAP 4/> PARM: CPT

## 2025-01-20 ENCOUNTER — TELEPHONE (OUTPATIENT)
Dept: SURGERY | Facility: CLINIC | Age: 60
End: 2025-01-20
Payer: COMMERCIAL

## 2025-01-20 NOTE — TELEPHONE ENCOUNTER
Left message informing patient of new arrival time for surgery 1/24 11:30 am. Asked patient to call back to confirm time.

## 2025-01-22 ENCOUNTER — HOSPITAL ENCOUNTER (OUTPATIENT)
Dept: PHYSICAL THERAPY | Facility: HOSPITAL | Age: 60
Setting detail: THERAPIES SERIES
Discharge: HOME | End: 2025-01-22
Payer: COMMERCIAL

## 2025-01-22 DIAGNOSIS — Z98.890 S/P LUMPECTOMY, LEFT BREAST: ICD-10-CM

## 2025-01-22 DIAGNOSIS — Z17.0 MALIGNANT NEOPLASM OF UPPER-OUTER QUADRANT OF LEFT BREAST IN FEMALE, ESTROGEN RECEPTOR POSITIVE: Primary | ICD-10-CM

## 2025-01-22 DIAGNOSIS — Z91.89 AT RISK FOR LYMPHEDEMA: ICD-10-CM

## 2025-01-22 DIAGNOSIS — C50.412 MALIGNANT NEOPLASM OF UPPER-OUTER QUADRANT OF LEFT BREAST IN FEMALE, ESTROGEN RECEPTOR POSITIVE: Primary | ICD-10-CM

## 2025-01-22 PROCEDURE — 97535 SELF CARE MNGMENT TRAINING: CPT

## 2025-01-22 PROCEDURE — 93702 BIS XTRACELL FLUID ANALYSIS: CPT

## 2025-01-22 PROCEDURE — 97161 PT EVAL LOW COMPLEX 20 MIN: CPT

## 2025-01-22 NOTE — THERAPY EVALUATION
Physical Therapy Lymphedema Initial Evaluation  [facility]     Patient Name: Sd Man  : 1965  MRN: [id number]  Today's Date: 2025      Visit Date: 2025    Visit Dx:    ICD-10-CM ICD-9-CM   1. Malignant neoplasm of upper-outer quadrant of left breast in female, estrogen receptor positive  C50.412 174.4    Z17.0 V86.0   2. At risk for lymphedema  Z91.89 V49.89   3. S/P lumpectomy, left breast  Z98.890 V45.89       Patient Active Problem List   Diagnosis    Attention deficit hyperactivity disorder (ADHD), predominantly inattentive type    Hypercholesterolemia with hypertriglyceridemia    Insulin resistance    Metabolic syndrome    Class 3 severe obesity due to excess calories with serious comorbidity and body mass index (BMI) of 40.0 to 44.9 in adult    PCOS (polycystic ovarian syndrome)    Positive colorectal cancer screening using Cologuard test    Vitamin D deficiency    Malignant neoplasm of upper-outer quadrant of left breast in female, estrogen receptor positive    Encounter for fitting and adjustment of vascular catheter    CHELO on CPAP    Hypoxemia associated with sleep    Hypersomnia due to medical condition    Jennifer's deformity of left heel    Calcific Achilles tendonitis    Closed nondisplaced fracture of proximal phalanx of lesser toe of left foot    Arthritis of first metatarsophalangeal (MTP) joint of left foot    Hallux valgus of left foot    Port-A-Cath in place        Past Medical History:   Diagnosis Date    ADHD     Anxiety     Breast cancer     Breast cancer     LEFT    Depression     History of chemotherapy     COMPLETED 2024    Hypoxemia associated with sleep     Insulin resistance     Metabolic syndrome X     Obesity 2012    CHELO on CPAP 2024    Home sleep study.  Weight 242 pounds.  Moderate CHELO with AHI 19.4 events per hour.  Low oxygen saturation 66% and sleep-related hypoxia abnormal for 43.4 minutes.    PCOS (polycystic ovarian syndrome)      PTSD (post-traumatic stress disorder)         Past Surgical History:   Procedure Laterality Date    BREAST LUMPECTOMY WITH SENTINEL NODE BIOPSY Left 12/19/2024    Procedure: Left partial mastectomy, wire localized, left sentinel lymph node biopsy, targeted axillary dissection - wire localized (intra-operative);  Surgeon: Moira Mccain MD;  Location: [facility];  Service: General;  Laterality: Left;    COLONOSCOPY      OOPHORECTOMY Bilateral 01/01/2014    VENOUS ACCESS DEVICE (PORT) INSERTION Right 06/20/2024    Procedure: INSERTION VENOUS ACCESS DEVICE;  Surgeon: Moira Mccain MD;  Location: [facility];  Service: General;  Laterality: Right;       Visit Dx:    ICD-10-CM ICD-9-CM   1. Malignant neoplasm of upper-outer quadrant of left breast in female, estrogen receptor positive  C50.412 174.4    Z17.0 V86.0   2. At risk for lymphedema  Z91.89 V49.89   3. S/P lumpectomy, left breast  Z98.890 V45.89        Patient History       Row Name 01/22/25 1500             History    Chief Complaint --  none  -LB      Date Current Problem(s) Began 12/19/24  -LB      Brief Description of Current Complaint Pt s/p L lumpectomy, SLNB, 0/4 LNs removed 12/19/24. She had neoadjuvant chemotherapy in [place] and will have radiation in [place] next month. She has recovered well. She has now retired from [employer] and states she has resumed her PLOF.  -LB      Previous treatment for THIS PROBLEM Surgery  -LB      Surgery Date: 12/19/24  -LB      Patient/Caregiver Goals Know what to do to help the symptoms;Return to prior level of function  -LB      Hand Dominance right-handed  -LB      Occupation/sports/leisure activities kayaking, boating  -LB      Patient seeing anyone else for problem(s)? yes  -LB      History of Previous Related Injuries none  -LB         Pain     Pain at Present 0  -LB      Pain at Best 0  -LB      Pain at Worst 0  -LB      Pain Comments no pain  -LB      Is your sleep disturbed? No  -LB      Difficulties at  work? No issues.  -LB      Difficulties with ADL's? No issues.  -LB      Difficulties with recreational activities? No issues.  -LB         Fall Risk Assessment    Any falls in the past year: No  -LB         Services    Prior Rehab/Home Health Experiences No  -LB      Are you currently receiving Home Health services No  -LB      Do you plan to receive Home Health services in the near future No  -LB         Daily Activities    Primary Language English  -LB      How does patient learn best? Listening;Reading;Demonstration  -LB      Barriers to learning None  -LB      Pt Participated in POC and Goals Yes  -LB         Safety    Are you being hurt, hit, or frightened by anyone at home or in your life? No  -LB      Are you being neglected by a caregiver No  -LB      Have you had any of the following issues with N/A  -LB                User Key  (r) = Recorded By, (t) = Taken By, (c) = Cosigned By      Initials Name Provider Type    LB Glenna Martínez PT Physical Therapist                     Lymphedema       Row Name 01/22/25 1500             Subjective Pain    Able to rate subjective pain? yes  -LB      Pre-Treatment Pain Level 0  -LB      Subjective Pain Comment No pain.  -LB         Subjective    Subjective Comments Eval  -LB         Lymphedema Assessment    Lymphedema Classification at risk/stage 0;LUE:  -LB      Lymphedema Cancer Related Sx left;sentinel node biopsy;lumpectomy  -LB      Lymphedema Surgery Comments 12/19/24  -LB      Lymph Nodes Removed # 4  -LB      Positive Lymph Nodes # 0  -LB      Chemo Received yes  -LB      Chemo Treatments #/Timeframe neoadjuvant  -LB      Radiation Therapy Received yes  -LB      Radiation Treatments #/Timeframe upcoming  -LB      Infections or Cellulitis? no  -LB         Posture/Observations    Posture/Observations Comments WNL  -LB         General ROM    GENERAL ROM COMMENTS BUE WFL  -LB         MMT (Manual Muscle Testing)    General MMT Comments BUE WFL  -LB          Lymphedema Edema Assessment    Edema Assessment Comment no obvious edema  -LB         Skin Changes/Observations    Skin Observations Comment normal  -LB         Lymphedema Sensation    Lymphedema Sensation Comments normal  -LB         Lymphedema Pulses/Capillary Refill    Lymph Pulses Capillary Refill Comments normal  -LB         Lymphedema Measurements    Measurement Type(s) Quick Girth  -LB         LUE Quick Girth (cm)    Axilla 37.7 cm  -LB      Mid upper arm 35.6 cm  -LB      Elbow 30 cm  -LB      Mid forearm 27.7 cm  -LB      Wrist crease 17.9 cm  -LB      Web space 21.5 cm  -LB      Met-heads 19.8 cm  -LB      LUE Quick Girth Total 190.2  -LB         RUE Quick Girth (cm)    Axilla 38.6 cm  -LB      Mid upper arm 35.4 cm  -LB      Elbow 29.8 cm  -LB      Mid forearm 29.6 cm  -LB      Wrist crease 18.1 cm  -LB      Web space 20.8 cm  -LB      Met-heads 19.5 cm  -LB      RUE Quick Girth Total 191.8  -LB         L-Dex Bioimpedence Screening    L-Dex Measurement Extremity LUE  -LB      L-Dex Patient Position Standing  -LB      L-Dex UE Dominate Side Right  -LB      L-Dex UE At Risk Side Left  -LB      L-Dex UE Pre Surgical Value No  -LB      L-Dex UE Score -0.6  -LB      L-Dex UE Baseline Score -0.6  -LB      L-Dex UE Value Change 0  -LB      L-Dex UE Comment WNL  -LB      $ L-Dex Charge yes  -LB                User Key  (r) = Recorded By, (t) = Taken By, (c) = Cosigned By      Initials Name Provider Type    Glenna Colin PT Physical Therapist                                    Therapy Education  Education Details: reviewed s/s of lymphedema, steps to prevention, bioimpedance results and interpretation, radiation considerations  Given: Symptoms/condition management, Edema management  Program: New  How Provided: Verbal  Provided to: Patient  Level of Understanding: Verbalized  90920 - PT Self Care/Mgmt Minutes: 15       OP Exercises       Row Name 01/22/25 1500             Subjective    Subjective Comments Eval   -LB         Subjective Pain    Able to rate subjective pain? yes  -LB      Pre-Treatment Pain Level 0  -LB      Subjective Pain Comment No pain.  -LB                User Key  (r) = Recorded By, (t) = Taken By, (c) = Cosigned By      Initials Name Provider Type    Glenna Colin, PT Physical Therapist                                 PT OP Goals       Row Name 01/22/25 1500          Long Term Goals    LTG Date to Achieve 02/21/25  -LB     LTG 1 Pt will maintain LDex bioimpedance score WNL.  -LB     LTG 1 Progress New  -LB     LTG 2 Pt will verbalize s/s of lymphedema and steps to prevention.  -LB     LTG 2 Progress New  -LB        Time Calculation    PT Goal Re-Cert Due Date 04/22/25  -LB               User Key  (r) = Recorded By, (t) = Taken By, (c) = Cosigned By      Initials Name Provider Type    Glenna Colin, PT Physical Therapist                     PT Assessment/Plan       Row Name 01/22/25 1539          PT Assessment    Functional Limitations Other (comment)  at risk for lymphedema  -LB     Impairments Impaired lymphatic circulation  -LB     Assessment Comments Pt is 58 yo female s/p L lumpectomy, SLNB, 0/4 LNs removed 12/19/24. She had neoadjuvant chemotherapy in [place] and will have radiation in [place] next month. She has recovered well. She has now retired from [employer] and states she has resumed her PLOF. Baseline circumferential measurements and LDex bioimpedance are WNL. LDex score -.6 today. She demonstrates full AROM and no s/s of lymphedema noted today. Pt is appropriate for continued skilled PT services to monitor for lymphedema and address deficits as needed.  -LB     Rehab Potential Good  -LB     Patient/caregiver participated in establishment of treatment plan and goals Yes  -LB     Patient would benefit from skilled therapy intervention Yes  -LB        PT Plan    PT Frequency Other (comment)  -LB     Predicted Duration of Therapy Intervention (PT) repeat bioimpedance in 6 months  -LB      Planned CPT's? PT EVAL LOW COMPLEXITY: 76490;PT RE-EVAL: 64167;PT THER PROC EA 15 MIN: 82536;PT THER ACT EA 15 MIN: 62215;PT MANUAL THERAPY EA 15 MIN: 55865;PT SELF CARE/HOME MGMT/TRAIN EA 15: 10712;PT BIS XTRACELL FLUID ANALYSIS: 62612  -LB     PT Plan Comments repeat bioimpedance in 6 months  -LB               User Key  (r) = Recorded By, (t) = Taken By, (c) = Cosigned By      Initials Name Provider Type    Glenna Colin, KATHIE Physical Therapist                       Outcome Measure Options: Quick DASH  Quick DASH  Open a tight or new jar.: No Difficulty  Do heavy household chores (e.g., wash walls, wash floors): No Difficulty  Carry a shopping bag or briefcase: No Difficulty  Wash your back: No Difficulty  Use a knife to cut food: No Difficulty  Recreational activities in which you take some force or impact through your arm, should or hand (e.g. golf, hammering, tennis, etc.): No Difficulty  During the past week, to what extent has your arm, shoulder, or hand problem interfered with your normal social activites with family, friends, neighbors or groups?: Not at all  During the past week, were you limited in your work or other regular daily activities as a result of your arm, shoulder or hand problem?: Not limited at all  Arm, Shoulder, or hand pain: None  Tingling (pins and needles) in your arm, shoulder, or hand: None  During the past week, how much difficulty have you had sleeping because of the pain in your arm, shoulder or hand?: No difficulty  Number of Questions Answered: 11  Quick DASH Score: 0         Time Calculation:   Start Time: 1445  Stop Time: 1530  Time Calculation (min): 45 min  Total Timed Code Minutes- PT: 15 minute(s)  Timed Charges  97238 - PT Self Care/Mgmt Minutes: 15  Total Minutes  Timed Charges Total Minutes: 15   Total Minutes: 15   Therapy Charges for Today       Code Description Service Date Service Provider Modifiers Qty    72644832413 HC PT BIS XTRACELL FLUID ANALYSIS 1/22/2025  Glenna Martínez, PT  1    74725901797 HC PT SELF CARE/MGMT/TRAIN EA 15 MIN 1/22/2025 Glenna Martínez, PT GP 1    80614375600 HC PT EVAL LOW COMPLEXITY 2 1/22/2025 Glenna Martínez, PT GP 1            PT G-Codes  Outcome Measure Options: Quick DASH  Quick DASH Score: 0         Glenna Martínez, PT  1/22/2025

## 2025-01-23 ENCOUNTER — TELEPHONE (OUTPATIENT)
Dept: SURGERY | Facility: CLINIC | Age: 60
End: 2025-01-23
Payer: COMMERCIAL

## 2025-01-24 ENCOUNTER — ANESTHESIA EVENT (OUTPATIENT)
Dept: PERIOP | Facility: HOSPITAL | Age: 60
End: 2025-01-24
Payer: COMMERCIAL

## 2025-01-24 ENCOUNTER — HOSPITAL ENCOUNTER (OUTPATIENT)
Facility: HOSPITAL | Age: 60
Setting detail: HOSPITAL OUTPATIENT SURGERY
Discharge: HOME | End: 2025-01-24
Attending: STUDENT IN AN ORGANIZED HEALTH CARE EDUCATION/TRAINING PROGRAM | Admitting: STUDENT IN AN ORGANIZED HEALTH CARE EDUCATION/TRAINING PROGRAM
Payer: COMMERCIAL

## 2025-01-24 ENCOUNTER — ANESTHESIA (OUTPATIENT)
Dept: PERIOP | Facility: HOSPITAL | Age: 60
End: 2025-01-24
Payer: COMMERCIAL

## 2025-01-24 VITALS
HEART RATE: 97 BPM | RESPIRATION RATE: 18 BRPM | OXYGEN SATURATION: 100 % | DIASTOLIC BLOOD PRESSURE: 89 MMHG | SYSTOLIC BLOOD PRESSURE: 124 MMHG | TEMPERATURE: 97.7 F

## 2025-01-24 PROCEDURE — 25010000002 LIDOCAINE 2% SOLUTION: Performed by: NURSE ANESTHETIST, CERTIFIED REGISTERED

## 2025-01-24 PROCEDURE — 25010000002 GLYCOPYRROLATE 1 MG/5ML SOLUTION: Performed by: NURSE ANESTHETIST, CERTIFIED REGISTERED

## 2025-01-24 PROCEDURE — 25010000002 LIDOCAINE 1% - EPINEPHRINE 1:100000 1 %-1:100000 SOLUTION 30 ML VIAL: Performed by: STUDENT IN AN ORGANIZED HEALTH CARE EDUCATION/TRAINING PROGRAM

## 2025-01-24 PROCEDURE — S0260 H&P FOR SURGERY: HCPCS | Performed by: STUDENT IN AN ORGANIZED HEALTH CARE EDUCATION/TRAINING PROGRAM

## 2025-01-24 PROCEDURE — 36590 REMOVAL TUNNELED CV CATH: CPT | Performed by: STUDENT IN AN ORGANIZED HEALTH CARE EDUCATION/TRAINING PROGRAM

## 2025-01-24 PROCEDURE — 25010000002 BUPIVACAINE (PF) 0.5 % SOLUTION 10 ML VIAL: Performed by: STUDENT IN AN ORGANIZED HEALTH CARE EDUCATION/TRAINING PROGRAM

## 2025-01-24 PROCEDURE — 25810000003 LACTATED RINGERS PER 1000 ML: Performed by: ANESTHESIOLOGY

## 2025-01-24 PROCEDURE — 25010000002 MIDAZOLAM PER 1 MG: Performed by: ANESTHESIOLOGY

## 2025-01-24 PROCEDURE — 25010000002 PROPOFOL 200 MG/20ML EMULSION: Performed by: NURSE ANESTHETIST, CERTIFIED REGISTERED

## 2025-01-24 RX ORDER — ATROPINE SULFATE 0.4 MG/ML
0.4 INJECTION, SOLUTION INTRAMUSCULAR; INTRAVENOUS; SUBCUTANEOUS ONCE AS NEEDED
Status: DISCONTINUED | OUTPATIENT
Start: 2025-01-24 | End: 2025-01-24 | Stop reason: HOSPADM

## 2025-01-24 RX ORDER — HYDROCODONE BITARTRATE AND ACETAMINOPHEN 5; 325 MG/1; MG/1
1 TABLET ORAL ONCE AS NEEDED
Status: DISCONTINUED | OUTPATIENT
Start: 2025-01-24 | End: 2025-01-24 | Stop reason: HOSPADM

## 2025-01-24 RX ORDER — EPHEDRINE SULFATE 50 MG/ML
5 INJECTION, SOLUTION INTRAVENOUS ONCE AS NEEDED
Status: DISCONTINUED | OUTPATIENT
Start: 2025-01-24 | End: 2025-01-24 | Stop reason: HOSPADM

## 2025-01-24 RX ORDER — PROMETHAZINE HYDROCHLORIDE 25 MG/1
25 TABLET ORAL ONCE AS NEEDED
Status: DISCONTINUED | OUTPATIENT
Start: 2025-01-24 | End: 2025-01-24 | Stop reason: HOSPADM

## 2025-01-24 RX ORDER — GLYCOPYRROLATE 0.2 MG/ML
INJECTION INTRAMUSCULAR; INTRAVENOUS AS NEEDED
Status: DISCONTINUED | OUTPATIENT
Start: 2025-01-24 | End: 2025-01-24 | Stop reason: SURG

## 2025-01-24 RX ORDER — LIDOCAINE HYDROCHLORIDE 10 MG/ML
0.5 INJECTION, SOLUTION INFILTRATION; PERINEURAL ONCE AS NEEDED
Status: DISCONTINUED | OUTPATIENT
Start: 2025-01-24 | End: 2025-01-24 | Stop reason: HOSPADM

## 2025-01-24 RX ORDER — SODIUM CHLORIDE 0.9 % (FLUSH) 0.9 %
3-10 SYRINGE (ML) INJECTION AS NEEDED
Status: DISCONTINUED | OUTPATIENT
Start: 2025-01-24 | End: 2025-01-24 | Stop reason: HOSPADM

## 2025-01-24 RX ORDER — SODIUM CHLORIDE, SODIUM LACTATE, POTASSIUM CHLORIDE, CALCIUM CHLORIDE 600; 310; 30; 20 MG/100ML; MG/100ML; MG/100ML; MG/100ML
9 INJECTION, SOLUTION INTRAVENOUS CONTINUOUS
Status: DISCONTINUED | OUTPATIENT
Start: 2025-01-24 | End: 2025-01-24 | Stop reason: HOSPADM

## 2025-01-24 RX ORDER — NALOXONE HCL 0.4 MG/ML
0.2 VIAL (ML) INJECTION AS NEEDED
Status: DISCONTINUED | OUTPATIENT
Start: 2025-01-24 | End: 2025-01-24 | Stop reason: HOSPADM

## 2025-01-24 RX ORDER — PROMETHAZINE HYDROCHLORIDE 25 MG/1
25 SUPPOSITORY RECTAL ONCE AS NEEDED
Status: DISCONTINUED | OUTPATIENT
Start: 2025-01-24 | End: 2025-01-24 | Stop reason: HOSPADM

## 2025-01-24 RX ORDER — PROPOFOL 10 MG/ML
INJECTION, EMULSION INTRAVENOUS CONTINUOUS PRN
Status: DISCONTINUED | OUTPATIENT
Start: 2025-01-24 | End: 2025-01-24 | Stop reason: SURG

## 2025-01-24 RX ORDER — ONDANSETRON 2 MG/ML
4 INJECTION INTRAMUSCULAR; INTRAVENOUS ONCE AS NEEDED
Status: DISCONTINUED | OUTPATIENT
Start: 2025-01-24 | End: 2025-01-24 | Stop reason: HOSPADM

## 2025-01-24 RX ORDER — LIDOCAINE HYDROCHLORIDE 20 MG/ML
INJECTION, SOLUTION INFILTRATION; PERINEURAL AS NEEDED
Status: DISCONTINUED | OUTPATIENT
Start: 2025-01-24 | End: 2025-01-24 | Stop reason: SURG

## 2025-01-24 RX ORDER — SODIUM CHLORIDE 0.9 % (FLUSH) 0.9 %
3 SYRINGE (ML) INJECTION EVERY 12 HOURS SCHEDULED
Status: DISCONTINUED | OUTPATIENT
Start: 2025-01-24 | End: 2025-01-24 | Stop reason: HOSPADM

## 2025-01-24 RX ORDER — OXYCODONE AND ACETAMINOPHEN 7.5; 325 MG/1; MG/1
1 TABLET ORAL EVERY 4 HOURS PRN
Status: DISCONTINUED | OUTPATIENT
Start: 2025-01-24 | End: 2025-01-24 | Stop reason: HOSPADM

## 2025-01-24 RX ORDER — FENTANYL CITRATE 50 UG/ML
50 INJECTION, SOLUTION INTRAMUSCULAR; INTRAVENOUS
Status: DISCONTINUED | OUTPATIENT
Start: 2025-01-24 | End: 2025-01-24 | Stop reason: HOSPADM

## 2025-01-24 RX ORDER — DIPHENHYDRAMINE HYDROCHLORIDE 50 MG/ML
12.5 INJECTION INTRAMUSCULAR; INTRAVENOUS
Status: DISCONTINUED | OUTPATIENT
Start: 2025-01-24 | End: 2025-01-24 | Stop reason: HOSPADM

## 2025-01-24 RX ORDER — PROPOFOL 10 MG/ML
INJECTION, EMULSION INTRAVENOUS AS NEEDED
Status: DISCONTINUED | OUTPATIENT
Start: 2025-01-24 | End: 2025-01-24 | Stop reason: SURG

## 2025-01-24 RX ORDER — HYDROMORPHONE HYDROCHLORIDE 1 MG/ML
0.5 INJECTION, SOLUTION INTRAMUSCULAR; INTRAVENOUS; SUBCUTANEOUS
Status: DISCONTINUED | OUTPATIENT
Start: 2025-01-24 | End: 2025-01-24 | Stop reason: HOSPADM

## 2025-01-24 RX ORDER — IPRATROPIUM BROMIDE AND ALBUTEROL SULFATE 2.5; .5 MG/3ML; MG/3ML
3 SOLUTION RESPIRATORY (INHALATION) ONCE AS NEEDED
Status: DISCONTINUED | OUTPATIENT
Start: 2025-01-24 | End: 2025-01-24 | Stop reason: HOSPADM

## 2025-01-24 RX ORDER — FENTANYL CITRATE 50 UG/ML
50 INJECTION, SOLUTION INTRAMUSCULAR; INTRAVENOUS ONCE AS NEEDED
Status: DISCONTINUED | OUTPATIENT
Start: 2025-01-24 | End: 2025-01-24 | Stop reason: HOSPADM

## 2025-01-24 RX ORDER — MIDAZOLAM HYDROCHLORIDE 1 MG/ML
1 INJECTION, SOLUTION INTRAMUSCULAR; INTRAVENOUS
Status: COMPLETED | OUTPATIENT
Start: 2025-01-24 | End: 2025-01-24

## 2025-01-24 RX ORDER — LABETALOL HYDROCHLORIDE 5 MG/ML
5 INJECTION, SOLUTION INTRAVENOUS
Status: DISCONTINUED | OUTPATIENT
Start: 2025-01-24 | End: 2025-01-24 | Stop reason: HOSPADM

## 2025-01-24 RX ORDER — FAMOTIDINE 10 MG/ML
20 INJECTION, SOLUTION INTRAVENOUS ONCE
Status: DISCONTINUED | OUTPATIENT
Start: 2025-01-24 | End: 2025-01-24 | Stop reason: HOSPADM

## 2025-01-24 RX ORDER — HYDRALAZINE HYDROCHLORIDE 20 MG/ML
5 INJECTION INTRAMUSCULAR; INTRAVENOUS
Status: DISCONTINUED | OUTPATIENT
Start: 2025-01-24 | End: 2025-01-24 | Stop reason: HOSPADM

## 2025-01-24 RX ORDER — FLUMAZENIL 0.1 MG/ML
0.2 INJECTION INTRAVENOUS AS NEEDED
Status: DISCONTINUED | OUTPATIENT
Start: 2025-01-24 | End: 2025-01-24 | Stop reason: HOSPADM

## 2025-01-24 RX ADMIN — PROPOFOL 175 MCG/KG/MIN: 10 INJECTION, EMULSION INTRAVENOUS at 13:17

## 2025-01-24 RX ADMIN — SODIUM CHLORIDE, POTASSIUM CHLORIDE, SODIUM LACTATE AND CALCIUM CHLORIDE 9 ML/HR: 600; 310; 30; 20 INJECTION, SOLUTION INTRAVENOUS at 11:51

## 2025-01-24 RX ADMIN — GLYCOPYRROLATE 0.3 MG: 0.2 INJECTION INTRAMUSCULAR; INTRAVENOUS at 13:22

## 2025-01-24 RX ADMIN — MIDAZOLAM 1 MG: 1 INJECTION INTRAMUSCULAR; INTRAVENOUS at 11:51

## 2025-01-24 RX ADMIN — LIDOCAINE HYDROCHLORIDE 100 MG: 20 INJECTION, SOLUTION INFILTRATION; PERINEURAL at 13:16

## 2025-01-24 RX ADMIN — MIDAZOLAM 1 MG: 1 INJECTION INTRAMUSCULAR; INTRAVENOUS at 12:12

## 2025-01-24 RX ADMIN — PROPOFOL 120 MG: 10 INJECTION, EMULSION INTRAVENOUS at 13:17

## 2025-01-24 NOTE — H&P
[facility]   Surgery HISTORY AND PHYSICAL    Patient Name:Sd Man  : 1965  MRN: [id number]  Primary Care Physician: Moira Tejada MD  Date of admission: 2025    Subjective   Subjective     Chief Complaint: port in place    History of Present Illness  Sd Man is a 59 y.o. female who presents for preoperative evaluation. She is scheduled for REMOVAL VENOUS ACCESS DEVICE (N/A)    Review of Systems   Constitutional:  Negative for fatigue and fever.   Gastrointestinal:  Negative for abdominal pain.   Skin:  Negative for rash and wound.        Personal History     Past Medical History:   Diagnosis Date    ADHD     Anxiety     Breast cancer     Breast cancer     LEFT    Depression     History of chemotherapy     COMPLETED 2024    Hypoxemia associated with sleep     Insulin resistance     Metabolic syndrome X     Obesity 2012    CHELO on CPAP 2024    Home sleep study.  Weight 242 pounds.  Moderate CHELO with AHI 19.4 events per hour.  Low oxygen saturation 66% and sleep-related hypoxia abnormal for 43.4 minutes.    PCOS (polycystic ovarian syndrome)     PTSD (post-traumatic stress disorder)        Past Surgical History:   Procedure Laterality Date    BREAST LUMPECTOMY WITH SENTINEL NODE BIOPSY Left 2024    Procedure: Left partial mastectomy, wire localized, left sentinel lymph node biopsy, targeted axillary dissection - wire localized (intra-operative);  Surgeon: Moira Mccain MD;  Location: [facility];  Service: General;  Laterality: Left;    COLONOSCOPY      OOPHORECTOMY Bilateral 2014    VENOUS ACCESS DEVICE (PORT) INSERTION Right 2024    Procedure: INSERTION VENOUS ACCESS DEVICE;  Surgeon: Moira Mccain MD;  Location: [facility];  Service: General;  Laterality: Right;       Family History: Her family history includes Atrial fibrillation in her father; Hypertension in her father; Melanoma (age of onset: 75) in her mother.     Social History: She   reports that she has never smoked. She has never been exposed to tobacco smoke. She has never used smokeless tobacco. She reports that she does not drink alcohol and does not use drugs.    Home Medications:  B-complex with vitamin C, Cholecalciferol, Serdexmethylphen-Dexmethylphen, Vortioxetine HBr, amphetamine-dextroamphetamine, anastrozole, melatonin, and metFORMIN ER    Allergies:  She is allergic to pepcid [famotidine].    Objective    Objective     Vitals:    Temp:  [98.9 °F (37.2 °C)] 98.9 °F (37.2 °C)  Heart Rate:  [107] 107  Resp:  [16] 16  BP: (127)/(89) 127/89    Physical Exam  Constitutional:       Appearance: Normal appearance.   HENT:      Head: Normocephalic and atraumatic.      Mouth/Throat:      Mouth: Mucous membranes are moist.   Eyes:      Extraocular Movements: Extraocular movements intact.   Cardiovascular:      Rate and Rhythm: Normal rate.   Pulmonary:      Effort: Pulmonary effort is normal. No respiratory distress.   Skin:     General: Skin is warm and dry.   Neurological:      Mental Status: She is alert.         Assessment & Plan   Assessment / Plan     Brief Patient Summary:  Sd Man is a 59 y.o. female who presents for surgery today - has port in place, completed all chemotherapy, she is ready for the port to be removed    Pre-Op Diagnosis Codes:      * Malignant neoplasm of upper-outer quadrant of left breast in female, estrogen receptor positive [C50.412, Z17.0]     * Port-A-Cath in place [Z95.828]    Active Hospital Problems:  Active Hospital Problems    Diagnosis     **Port-A-Cath in place     Malignant neoplasm of upper-outer quadrant of left breast in female, estrogen receptor positive      Plan:   Procedure(s):  REMOVAL VENOUS ACCESS DEVICE    The risks, benefits, and alternatives of the procedure including but not limited to infection, bleeding, pneumothorax, scar and risks of the anesthesia were discussed in detail with the patient and questions were answered. No guarantees  were made or implied. Informed consent was obtained.    Moira Mccain MD

## 2025-01-24 NOTE — OP NOTE
REMOVAL VENOUS ACCESS DEVICE  Procedure Report    Patient Name:  Sd Man  YOB: 1965    Date of Surgery:  1/24/2025       Pre-op Diagnosis:   Malignant neoplasm of upper-outer quadrant of left breast in female, estrogen receptor positive [C50.412, Z17.0]  Port-A-Cath in place [Z95.828]       Post-Op Diagnosis Codes:     * Malignant neoplasm of upper-outer quadrant of left breast in female, estrogen receptor positive [C50.412, Z17.0]     * Port-A-Cath in place [Z95.828]    Procedure/CPT® Codes:  IN RMVL SHABNAM CTR VAD W/SUBQ PORT/ CTR/PRPH INSJ [78078]    Procedure(s):  REMOVAL VENOUS ACCESS DEVICE        Staff:  Surgeon(s):  Moira Mccain MD        Anesthesia: Monitored Anesthesia Care    Estimated Blood Loss: minimal    Implants:    Nothing was implanted during the procedure    Specimen:          None        Findings: port and attached catheter removed entirely    Complications: none    Description of Procedure:   The risks and benefits of the procedure were explained in detail to the patient.  Informed consent was obtained.  The patient was taken to the operating room and placed supine on the table and sequential compression devices were applied.  The chest and neck were prepped and draped in the standard surgical sterile fashion.  An appropriate time out procedure was performed, the skin overlying the previous incision on the right upper chest was anesthetized with local anesthetic (lidocaine/marcaine with epinephrine mix).  An incision was made directly over the previous scar.  Dissection was carried out through the subcutaneous tissue with the use of blunt and sharp dissection to identify the underlying chemoport.  Electrocautery and sharp dissection was utilized to dissect the port from its position within the subcutaneous tissue. A U stitch with 3 vicryl was placed along the path of the catheter.  The patient was moved into a trendelenberg position prior to removal of the catheter and  port.  The port and catheter were removed intact and the u stitch was then tied and pressure held on the catheter path for 5 minutes to ensure proper hemostasis.   Once hemostasis from the wound and the catheter tract was ensured, the wound edges were trimmed as needed then closed with a deep absorbable 3-0 vicryl suture and then a running 4-0 monocryl subcuticular stitch.  Dermabond was then applied.  The patient tolerated the procedure well and was transported to the post anesthesia care unit in stable condition.            Moira Mccain MD     Date: 1/24/2025  Time: 13:51 EST

## 2025-01-24 NOTE — ANESTHESIA PREPROCEDURE EVALUATION
Anesthesia Evaluation     Patient summary reviewed and Nursing notes reviewed   NPO Solid Status: > 8 hours             Airway   Mallampati: III  Possible difficult intubation and Large neck circumference  Dental - normal exam     Pulmonary    (+) ,sleep apnea  Cardiovascular     ECG reviewed  Rhythm: regular    (+) hyperlipidemia    ROS comment: Baseline echo with ejection fraction 65%, GLS -19.6% with grade 1 diastolic dysfunction trivial valve regurgitation.  There was moderate left ventricular hypertrophy.    sinus rhythm    Neuro/Psych  (+) psychiatric history Anxiety and PTSD  GI/Hepatic/Renal/Endo    (+) morbid obesity    ROS Comment: Pcos  Metabolic syndrome      Musculoskeletal (-) negative ROS    Abdominal   (+) obese   Substance History - negative use     OB/GYN negative ob/gyn ROS         Other      history of cancer                  Anesthesia Plan    ASA 3     MAC     intravenous induction     Anesthetic plan, risks, benefits, and alternatives have been provided, discussed and informed consent has been obtained with: patient.    CODE STATUS:

## 2025-01-24 NOTE — ANESTHESIA POSTPROCEDURE EVALUATION
Patient: Sd Man    Procedure Summary       Date: 01/24/25 Room / Location: [facility] OR [address] [place] [address] MAIN OR    Anesthesia Start: 1312 Anesthesia Stop: 1350    Procedure: REMOVAL VENOUS ACCESS DEVICE (Right) Diagnosis:       Malignant neoplasm of upper-outer quadrant of left breast in female, estrogen receptor positive      Port-A-Cath in place      (Malignant neoplasm of upper-outer quadrant of left breast in female, estrogen receptor positive [C50.412, Z17.0])      (Port-A-Cath in place [Z95.828])    Surgeons: Moira Mccain MD Provider: Cinthia Dickerson MD    Anesthesia Type: MAC ASA Status: 3            Anesthesia Type: MAC    Vitals  Vitals Value Taken Time   /89 01/24/25 1401   Temp 36.5 °C (97.7 °F) 01/24/25 1349   Pulse 92 01/24/25 1401   Resp 18 01/24/25 1400   SpO2 100 % 01/24/25 1401   Vitals shown include unfiled device data.        Post Anesthesia Care and Evaluation    Patient location during evaluation: PHASE II  Patient participation: complete - patient participated  Level of consciousness: awake and alert  Pain management: adequate    Airway patency: patent  Anesthetic complications: No anesthetic complications  PONV Status: none  Cardiovascular status: acceptable and hemodynamically stable  Respiratory status: acceptable, nonlabored ventilation and spontaneous ventilation  Hydration status: acceptable

## 2025-01-26 PROCEDURE — 95811 POLYSOM 6/>YRS CPAP 4/> PARM: CPT | Performed by: INTERNAL MEDICINE

## 2025-03-18 ENCOUNTER — TELEPHONE (OUTPATIENT)
Dept: ONCOLOGY | Facility: CLINIC | Age: 60
End: 2025-03-18
Payer: COMMERCIAL

## 2025-03-18 RX ORDER — ANASTROZOLE 1 MG/1
1 TABLET ORAL DAILY
Qty: 30 TABLET | Refills: 5 | Status: SHIPPED | OUTPATIENT
Start: 2025-03-18

## 2025-03-18 NOTE — TELEPHONE ENCOUNTER
Caller: Sd Man    Relationship: Self    Best call back number:     [phone]       Requested Prescriptions: PT WAS SUPPOSED TO HAVE A HORMONE BLOCKER SENT IN FOR WHEN SHE COMPLETED HER RADIATION. THE PT COMPLETED THE RADIATION LAST WEEK AND WOULD LIKE FOR THAT TO BE SENT TO THE PHARMACY SO SHE CAN START TAKING.     PT NEEDS TO BE ADVISED WHEN SHE SHOULD START TAKING THE MEDICATION.     PLEASE ADVISE THE PT WHEN THIS IS SENT IN.     Pharm[facility] where request [facility] be sent: [id number] - [place] [id number]  [address] [facility] [phone] [facility] [phone] FX     Last office visit with prescribing clinician: 1/2/2025   Last telemedicine visit with prescribing clinician: Visit date not found   Next office visit with prescribing clinician: Visit date not found     Additional details provided by patient:     Does the patient have less than a 3 day supply:  [] Yes  [x] No    Would you like a call back once the refill request has been completed: [x] Yes [] No    If the office needs to give you a call back, can they leave a voicemail: [x] Yes [] No

## 2025-03-18 NOTE — TELEPHONE ENCOUNTER
Caller: Sd Man    Relationship to patient: Self    Best call back number: [phone]     Chief complaint: PT NEEDS TO R/S     Type of visit: LAB AND FOLLOW UP     Requested date: FIRST AVAILABLE AFTER THE FIRST OF APRIL     If rescheduling, when is the original appointment: 02/25/25

## 2025-04-09 ENCOUNTER — OFFICE VISIT (OUTPATIENT)
Dept: SLEEP MEDICINE | Facility: HOSPITAL | Age: 60
End: 2025-04-09
Payer: COMMERCIAL

## 2025-04-09 ENCOUNTER — TELEPHONE (OUTPATIENT)
Dept: SLEEP MEDICINE | Facility: HOSPITAL | Age: 60
End: 2025-04-09
Payer: COMMERCIAL

## 2025-04-09 VITALS — WEIGHT: 242 LBS | BODY MASS INDEX: 41.32 KG/M2 | OXYGEN SATURATION: 97 % | HEART RATE: 117 BPM | HEIGHT: 64 IN

## 2025-04-09 DIAGNOSIS — E66.01 CLASS 3 SEVERE OBESITY DUE TO EXCESS CALORIES WITH SERIOUS COMORBIDITY AND BODY MASS INDEX (BMI) OF 40.0 TO 44.9 IN ADULT: ICD-10-CM

## 2025-04-09 DIAGNOSIS — G47.33 OSA ON CPAP: Primary | ICD-10-CM

## 2025-04-09 DIAGNOSIS — G47.36 HYPOXEMIA ASSOCIATED WITH SLEEP: ICD-10-CM

## 2025-04-09 DIAGNOSIS — E66.813 CLASS 3 SEVERE OBESITY DUE TO EXCESS CALORIES WITH SERIOUS COMORBIDITY AND BODY MASS INDEX (BMI) OF 40.0 TO 44.9 IN ADULT: ICD-10-CM

## 2025-04-09 DIAGNOSIS — G47.14 HYPERSOMNIA DUE TO MEDICAL CONDITION: ICD-10-CM

## 2025-04-09 PROCEDURE — G0463 HOSPITAL OUTPT CLINIC VISIT: HCPCS

## 2025-04-09 PROCEDURE — 99214 OFFICE O/P EST MOD 30 MIN: CPT | Performed by: INTERNAL MEDICINE

## 2025-04-09 NOTE — PROGRESS NOTES
"[facility]  Follow Up Sleep Disorders Center Note     Chief Complaint:  CHELO     Primary Care Physician: Moira Tejada MD    Interval History:   The patient is a 59 y.o. female who I last saw 12/11/2020 for and that note was reviewed. Home sleep study performed 6/17/2024. Moderate severity obstructive sleep apnea with AHI 19.4 events per hour noted. Low oxygen saturation 66% and sleep-related hypoxia abnormal for 43.4 minutes.  Auto CPAP initiated.  There was questions related to her complaints.  Overnight polysomnogram, split-night study, performed 1/18/2025.  Mild CHELO identified.  However, apnea plus hypopnea plus RERA index abnormal at 29.8 events per hour.  Auto CPAP continued.  Patient is here today for follow-up.  She states she is doing well without new complaints.  She finished radiation therapy in March.  She goes to bed at midnight gets out of bed at 9 AM.  She will use the restroom during that time.    Review of Systems:    A complete review of systems was done and all were negative with the exception of the above    Social History:    Social History     Socioeconomic History    Marital status:    Tobacco Use    Smoking status: Never     Passive exposure: Never    Smokeless tobacco: Never   Vaping Use    Vaping status: Never Used   Substance and Sexual Activity    Alcohol use: Never    Drug use: Never    Sexual activity: Not Currently       Allergies:  Pepcid [famotidine]     Medication Review: Her list was reviewed.      Vital Signs:    Vitals:    04/09/25 1128   Pulse: 117   SpO2: 97%   Weight: 110 kg (242 lb)   Height: 162.6 cm (64\")     Body mass index is 41.54 kg/m².    Physical Exam:    Constitutional:  Well developed 59 y.o. female that appears in no apparent distress.  Awake & oriented times 3.  Normal mood with normal recent and remote memory and normal judgement.  Eyes:  Conjunctivae normal.  Oropharynx: Previously, moist mucous membranes without exudate and a large " tongue and class III Mallampati airway. .    Self-administered Melbourne Sleepiness Scale test results: 5  0-5 Lower normal daytime sleepiness  6-10 Higher normal daytime sleepiness  11-12 Mild, 13-15 Moderate, & 16-24 Severe excessive daytime sleepiness     Downloaded PAP Data Evaluated For Therapeutic Response and Compliance:  DME is [facility] in [place] and the patient uses a nasal pillow.  Downloads between 1/8 and 4/7/2025 compliance 91%.  Average usage is 6 hours and 29 minutes.  Average AHI is normal without leak.  Average auto CPAP pressure is 13.8 and her ResMed auto CPAP is 8-20    I have reviewed the above results and compared them with the patient's last downloads and reviewed with the patient.    Impression:   Moderate severity obstructive sleep apnea with sleep-related hypoxia by home sleep study 6/17/2024, weight 242 pounds.  Mild severity obstructive sleep apnea, moderate severity including respiratory effort related arousals, no sleep-related hypoxia, by split-night overnight polysomnogram 1/19/2025, weight 235 pounds., adequately treated with ResMed auto CPAP.  Downloads demonstrate the patient to be at goal with compliance and usage.  The patient has some persistent complaints of hypersomnolence but her Melbourne Sleepiness Scale has improved from 11 down to 9 down to 4 and today is 5.     Plan:  Good sleep hygiene measures should be maintained.  Weight loss would be beneficial in this patient who has class III severe obesity by Body mass index is 41.54 kg/m².      After evaluating the patient and assessing results available, the patient is benefiting from the treatment being provided.     The patient will continue ResMed auto CPAP.  Potential side effects of not using PAP therapy reviewed and addressed as needed.  After clinical evaluation and review of downloads, I recommend changes to the patient's pressures.  Based on symptoms and downloads, Via modem, auto CPAP will be changed 8-15 cm water  pressure.  A new prescription will be sent to the patient's DME.    Additionally, reviewed medications that are FDA approved for hypersomnolence despite persistent well treated CHELO; modafinil and armodafinil.  I did review the patient's medication list that she is presently taking which includes Astaryz and as needed Adderall    I answered all of the patient's questions.  The patient will call the Sleep Disorder Center for any problems and will follow up in 8-9 months.      Sanchez Payton MD  Sleep Medicine  04/09/25  11:55 EDT

## 2025-04-10 ENCOUNTER — TELEPHONE (OUTPATIENT)
Dept: SLEEP MEDICINE | Facility: HOSPITAL | Age: 60
End: 2025-04-10
Payer: COMMERCIAL

## 2025-04-17 ENCOUNTER — TELEPHONE (OUTPATIENT)
Dept: ONCOLOGY | Facility: CLINIC | Age: 60
End: 2025-04-17
Payer: COMMERCIAL

## 2025-04-17 NOTE — TELEPHONE ENCOUNTER
Caller: Sd Man    Relationship to patient: Self    Best call back number: [phone]     Chief complaint: PATIENT TO RESCHEDULE FROM 2/25/25 APPTS    Type of visit: LAB AND FU    Requested date: NEXT AVAILABLE

## 2025-04-29 ENCOUNTER — TELEPHONE (OUTPATIENT)
Dept: CARDIOLOGY | Age: 60
End: 2025-04-29

## 2025-04-29 ENCOUNTER — TELEPHONE (OUTPATIENT)
Dept: ONCOLOGY | Facility: CLINIC | Age: 60
End: 2025-04-29
Payer: COMMERCIAL

## 2025-04-29 RX ORDER — ONDANSETRON 8 MG/1
8 TABLET, FILM COATED ORAL EVERY 8 HOURS PRN
Qty: 60 TABLET | Refills: 0 | Status: SHIPPED | OUTPATIENT
Start: 2025-04-29

## 2025-04-29 NOTE — TELEPHONE ENCOUNTER
Caller: Sd Man    Relationship to patient: Self    Best call back number: [phone]    Patient is needing: PT IS A CARDIAC ONCOLOGY PT. NEEDS TO RSD APPT.

## 2025-04-29 NOTE — TELEPHONE ENCOUNTER
Provider: Denton  Caller: patient  Relationship to Patient: self  Call Back Phone Number: [phone]  Reason for Call: patient is calling for a refill for Zofran called to [facility] on [facility]

## 2025-05-05 ENCOUNTER — TELEPHONE (OUTPATIENT)
Dept: SLEEP MEDICINE | Facility: HOSPITAL | Age: 60
End: 2025-05-05
Payer: COMMERCIAL

## 2025-05-15 ENCOUNTER — TELEPHONE (OUTPATIENT)
Dept: ONCOLOGY | Facility: CLINIC | Age: 60
End: 2025-05-15
Payer: COMMERCIAL

## 2025-05-15 NOTE — TELEPHONE ENCOUNTER
Provider: Denton  Caller: patient  Relationship to Patient: self  Call Back Phone Number: [phone]  Reason for Call: patient has pain to her left breast

## 2025-05-16 ENCOUNTER — TELEPHONE (OUTPATIENT)
Dept: ONCOLOGY | Facility: CLINIC | Age: 60
End: 2025-05-16
Payer: COMMERCIAL

## 2025-05-16 ENCOUNTER — TELEPHONE (OUTPATIENT)
Dept: SURGERY | Facility: CLINIC | Age: 60
End: 2025-05-16
Payer: COMMERCIAL

## 2025-05-16 NOTE — TELEPHONE ENCOUNTER
Provider: Denton  Caller: patient  Relationship to Patient: self  Call Back Phone Number: [phone]  Reason for Call: patient is returning a call from yesterday

## 2025-05-16 NOTE — TELEPHONE ENCOUNTER
Patient called after calling Dr. Chawla's office to determine next steps for painful rash that started on her left breast Tuesday. Noticed after coming in from outside- had small visible red bumps. Now it has gotten more tender to the touch and swollen. Small black spots where the rash had been. Patient was asked to call the [facility] per both Dr's advice. Dr. Mccain is also available if not improved by Monday. Also provided the following recommendations below as well.

## 2025-05-16 NOTE — TELEPHONE ENCOUNTER
Provider: Denton  Caller: patient  Relationship to Patient: self  Call Back Phone Number: [phone]   Reason for Call: Pt following up to make sure Dr. Chawla is able to view changes to her breast she uploaded.

## 2025-05-20 ENCOUNTER — HOSPITAL ENCOUNTER (OUTPATIENT)
Dept: PHYSICAL THERAPY | Facility: HOSPITAL | Age: 60
Setting detail: THERAPIES SERIES
Discharge: HOME | End: 2025-05-20
Payer: COMMERCIAL

## 2025-05-20 DIAGNOSIS — I89.0 LYMPHEDEMA OF BREAST: ICD-10-CM

## 2025-05-20 DIAGNOSIS — Z98.890 S/P LUMPECTOMY, LEFT BREAST: ICD-10-CM

## 2025-05-20 DIAGNOSIS — Z17.0 MALIGNANT NEOPLASM OF UPPER-OUTER QUADRANT OF LEFT BREAST IN FEMALE, ESTROGEN RECEPTOR POSITIVE: Primary | ICD-10-CM

## 2025-05-20 DIAGNOSIS — C50.412 MALIGNANT NEOPLASM OF UPPER-OUTER QUADRANT OF LEFT BREAST IN FEMALE, ESTROGEN RECEPTOR POSITIVE: Primary | ICD-10-CM

## 2025-05-20 PROCEDURE — 97140 MANUAL THERAPY 1/> REGIONS: CPT

## 2025-05-20 PROCEDURE — 97535 SELF CARE MNGMENT TRAINING: CPT

## 2025-05-20 NOTE — THERAPY RE-EVALUATION
Physical Therapy Lymphedema Re-Evaluation  [facility]     Patient Name: Sd Man  : 1965  MRN: [id number]  Today's Date: 2025      Visit Date: 2025    Visit Dx:    ICD-10-CM ICD-9-CM   1. Malignant neoplasm of upper-outer quadrant of left breast in female, estrogen receptor positive  C50.412 174.4    Z17.0 V86.0   2. S/P lumpectomy, left breast  Z98.890 V45.89   3. Lymphedema of breast  I89.0 457.1       Patient Active Problem List   Diagnosis    Attention deficit hyperactivity disorder (ADHD), predominantly inattentive type    Hypercholesterolemia with hypertriglyceridemia    Insulin resistance    Metabolic syndrome    Class 3 severe obesity due to excess calories with serious comorbidity and body mass index (BMI) of 40.0 to 44.9 in adult    PCOS (polycystic ovarian syndrome)    Positive colorectal cancer screening using Cologuard test    Vitamin D deficiency    Malignant neoplasm of upper-outer quadrant of left breast in female, estrogen receptor positive    Encounter for fitting and adjustment of vascular catheter    CHELO on CPAP    Hypoxemia associated with sleep    Hypersomnia due to medical condition    Jennifer's deformity of left heel    Calcific Achilles tendonitis    Closed nondisplaced fracture of proximal phalanx of lesser toe of left foot    Arthritis of first metatarsophalangeal (MTP) joint of left foot    Hallux valgus of left foot    Port-A-Cath in place        Past Medical History:   Diagnosis Date    ADHD     Anxiety     Breast cancer     Breast cancer     LEFT    Depression     History of chemotherapy     COMPLETED 2024    Hypoxemia associated with sleep     Insulin resistance     Metabolic syndrome X     Obesity 2012    CHELO on CPAP 2024    Home sleep study.  Weight 242 pounds.  Moderate CHELO with AHI 19.4 events per hour.  Low oxygen saturation 66% and sleep-related hypoxia abnormal for 43.4 minutes.    PCOS (polycystic ovarian syndrome)     PTSD  (post-traumatic stress disorder)         Past Surgical History:   Procedure Laterality Date    BREAST LUMPECTOMY WITH SENTINEL NODE BIOPSY Left 12/19/2024    Procedure: Left partial mastectomy, wire localized, left sentinel lymph node biopsy, targeted axillary dissection - wire localized (intra-operative);  Surgeon: Moira Mccain MD;  Location: [facility];  Service: General;  Laterality: Left;    COLONOSCOPY      OOPHORECTOMY Bilateral 01/01/2014    VENOUS ACCESS DEVICE (PORT) INSERTION Right 06/20/2024    Procedure: INSERTION VENOUS ACCESS DEVICE;  Surgeon: Moira Mccain MD;  Location: [facility];  Service: General;  Laterality: Right;    VENOUS ACCESS DEVICE (PORT) REMOVAL Right 1/24/2025    Procedure: REMOVAL VENOUS ACCESS DEVICE;  Surgeon: Moira Mccain MD;  Location: [facility];  Service: General;  Laterality: Right;       Visit Dx:    ICD-10-CM ICD-9-CM   1. Malignant neoplasm of upper-outer quadrant of left breast in female, estrogen receptor positive  C50.412 174.4    Z17.0 V86.0   2. S/P lumpectomy, left breast  Z98.890 V45.89   3. Lymphedema of breast  I89.0 457.1            Lymphedema       Row Name 05/20/25 1000             Subjective Pain    Able to rate subjective pain? yes  -LB      Pre-Treatment Pain Level 0  -LB      Post-Treatment Pain Level 0  -LB         Subjective    Subjective Comments My breast started hurting a few weeks ago.  -LB         Lymphedema Assessment    Lymphedema Classification at risk/stage 0;LUE:  -LB      Lymphedema Cancer Related Sx left;sentinel node biopsy;lumpectomy  -LB      Lymphedema Surgery Comments 12/19/24  -LB      Lymph Nodes Removed # 4  -LB      Positive Lymph Nodes # 0  -LB      Chemo Received yes  -LB      Chemo Treatments #/Timeframe neoadjuvant  -LB      Radiation Therapy Received yes  -LB      Radiation Treatments #/Timeframe upcoming  -LB      Infections or Cellulitis? no  -LB         Posture/Observations    Posture/Observations Comments L breast  with enlarged pores, orange peel appearance  -LB         General ROM    GENERAL ROM COMMENTS BUE WFL  -LB         MMT (Manual Muscle Testing)    General MMT Comments BUE WFL  -LB         Lymphedema Edema Assessment    Edema Assessment Comment L breast with mild edema  -LB         Skin Changes/Observations    Skin Observations Comment inc redness, enlarged pores  -LB         Lymphedema Sensation    Lymphedema Sensation Comments normal  -LB         Lymphedema Pulses/Capillary Refill    Lymph Pulses Capillary Refill Comments normal  -LB         Lymphedema Measurements    Measurement Type(s) Quick Girth  -LB         Manual Lymphatic Drainage    Manual Therapy instructed in MLD  -LB         Compression/Skin Care    Compression/Skin Care Comments issued examples of compression bra, swell spot  -LB                User Key  (r) = Recorded By, (t) = Taken By, (c) = Cosigned By      Initials Name Provider Type    Glenna Colin, KATHIE Physical Therapist                                    Therapy Education  Education Details: instructed pt in self MLD, use of compression bra, swell spot, considerations for sleeping position  Given: Symptoms/condition management, Edema management  Program: Reinforced, New  How Provided: Verbal, Demonstration, Written  Provided to: Patient  Level of Understanding: Teach back education performed, Verbalized, Demonstrated  14805 - PT Self Care/Mgmt Minutes: 10       OP Exercises       Row Name 05/20/25 1300 05/20/25 1000          Subjective    Subjective Comments -- My breast started hurting a few weeks ago.  -LB        Subjective Pain    Able to rate subjective pain? -- yes  -LB     Pre-Treatment Pain Level -- 0  -LB     Post-Treatment Pain Level -- 0  -LB        Total Minutes    27631 - PT Manual Therapy Minutes 30  -LB --               User Key  (r) = Recorded By, (t) = Taken By, (c) = Cosigned By      Initials Name Provider Type    Glenna Colin, KATHIE Physical Therapist                             Manual Rx (Last 36 Hours)       Manual Treatments       Row Name 05/20/25 1300             Total Minutes    09908 - PT Manual Therapy Minutes 30  -LB         Manual Rx 1    Manual Rx 1 Location L breast MLD, L shoulder PROM  -LB      Manual Rx 1 Type gentle MLD, demo and education  -LB      Manual Rx 1 Duration 30  -LB                User Key  (r) = Recorded By, (t) = Taken By, (c) = Cosigned By      Initials Name Provider Type    Glenna Colin, PT Physical Therapist                     PT OP Goals       Row Name 05/20/25 1300          PT Short Term Goals    STG Date to Achieve 06/03/25  -LB     STG 1 Pt will be compliant with self MLD, use of compression bra/swell spot for night time, avoiding L sided sleeping position.  -LB     STG 1 Progress New  -LB        Long Term Goals    LTG Date to Achieve 02/21/25  -LB     LTG 1 Pt will maintain LDex bioimpedance score WNL.  -LB     LTG 1 Progress New  -LB     LTG 2 Pt will verbalize s/s of lymphedema and steps to prevention.  -LB     LTG 2 Progress New  -LB     LTG 3 Pt will demonstrate independence with management of L breast lymphedema.  -LB     LTG 3 Progress New  -LB        Time Calculation    PT Goal Re-Cert Due Date 08/18/25  -LB               User Key  (r) = Recorded By, (t) = Taken By, (c) = Cosigned By      Initials Name Provider Type    Glenan Colin, PT Physical Therapist                     PT Assessment/Plan       Row Name 05/20/25 1316          PT Assessment    Functional Limitations Other (comment)  L breast lymphedema  -LB     Impairments Impaired lymphatic circulation;Edema;Impaired flexibility;Pain;Sensation  -LB     Assessment Comments Pt returns for follow up reporting onset of L breast discomfort and redness over last 2 weeks. She demonstrates inc L breast pore size, inc orange peel appearance, mild edema, mild inc redness. S/s consistent with breast lymphedema. We discussed management of condition including self MLD, use of  compression bra, swell spot, avoiding L SLing sleeping position. She reports understanding of condition and will return to continue MLD/STM to manage condition.  -LB     Rehab Potential Good  -LB     Patient/caregiver participated in establishment of treatment plan and goals Yes  -LB     Patient would benefit from skilled therapy intervention Yes  -LB        PT Plan    PT Frequency 1x/week  -LB     Predicted Duration of Therapy Intervention (PT) 6-8 visits  -LB     Planned CPT's? PT EVAL LOW COMPLEXITY: 16539;PT RE-EVAL: 59657;PT THER PROC EA 15 MIN: 70788;PT THER ACT EA 15 MIN: 40489;PT MANUAL THERAPY EA 15 MIN: 63757;PT NEUROMUSC RE-EDUCATION EA 15 MIN: 61027;PT SELF CARE/HOME MGMT/TRAIN EA 15: 02903;PT BIS XTRACELL FLUID ANALYSIS: 25317  -LB     PT Plan Comments repeat bioimpedance, MLD as needed  -LB               User Key  (r) = Recorded By, (t) = Taken By, (c) = Cosigned By      Initials Name Provider Type    LB Glenna Martínez, PT Physical Therapist                                 Time Calculation:   Start Time: 0745  Stop Time: 0830  Time Calculation (min): 45 min  Total Timed Code Minutes- PT: 40 minute(s)  Timed Charges  69057 - PT Manual Therapy Minutes: 30  44105 - PT Self Care/Mgmt Minutes: 10  Total Minutes  Timed Charges Total Minutes: 10   Total Minutes: 10   Therapy Charges for Today       Code Description Service Date Service Provider Modifiers Qty    33379996405 HC PT MANUAL THERAPY EA 15 MIN 5/20/2025 Glenna Martínez, PT GP 2    66278377879 HC PT SELF CARE/MGMT/TRAIN EA 15 MIN 5/20/2025 Glenna Martínez, PT GP 1                      Glenna Martínez PT  5/20/2025

## 2025-05-23 ENCOUNTER — OFFICE VISIT (OUTPATIENT)
Dept: ONCOLOGY | Facility: CLINIC | Age: 60
End: 2025-05-23
Payer: COMMERCIAL

## 2025-05-23 ENCOUNTER — LAB (OUTPATIENT)
Dept: LAB | Facility: HOSPITAL | Age: 60
End: 2025-05-23
Payer: COMMERCIAL

## 2025-05-23 VITALS
HEART RATE: 100 BPM | TEMPERATURE: 98.2 F | BODY MASS INDEX: 41.19 KG/M2 | DIASTOLIC BLOOD PRESSURE: 84 MMHG | HEIGHT: 64 IN | OXYGEN SATURATION: 99 % | SYSTOLIC BLOOD PRESSURE: 123 MMHG | WEIGHT: 241.3 LBS

## 2025-05-23 DIAGNOSIS — Z17.0 MALIGNANT NEOPLASM OF UPPER-OUTER QUADRANT OF LEFT BREAST IN FEMALE, ESTROGEN RECEPTOR POSITIVE: ICD-10-CM

## 2025-05-23 DIAGNOSIS — C50.412 MALIGNANT NEOPLASM OF UPPER-OUTER QUADRANT OF LEFT BREAST IN FEMALE, ESTROGEN RECEPTOR POSITIVE: Primary | ICD-10-CM

## 2025-05-23 DIAGNOSIS — C50.412 MALIGNANT NEOPLASM OF UPPER-OUTER QUADRANT OF LEFT BREAST IN FEMALE, ESTROGEN RECEPTOR POSITIVE: ICD-10-CM

## 2025-05-23 DIAGNOSIS — Z17.0 MALIGNANT NEOPLASM OF UPPER-OUTER QUADRANT OF LEFT BREAST IN FEMALE, ESTROGEN RECEPTOR POSITIVE: Primary | ICD-10-CM

## 2025-05-23 LAB
ALBUMIN SERPL-MCNC: 4.5 G/DL (ref 3.5–5.2)
ALBUMIN/GLOB SERPL: 1.9 G/DL
ALP SERPL-CCNC: 82 U/L (ref 39–117)
ALT SERPL W P-5'-P-CCNC: 52 U/L (ref 1–33)
ANION GAP SERPL CALCULATED.3IONS-SCNC: 11.1 MMOL/L (ref 5–15)
AST SERPL-CCNC: 37 U/L (ref 1–32)
BASOPHILS # BLD AUTO: 0.03 10*3/MM3 (ref 0–0.2)
BASOPHILS NFR BLD AUTO: 0.5 % (ref 0–1.5)
BILIRUB SERPL-MCNC: 0.5 MG/DL (ref 0–1.2)
BUN SERPL-MCNC: 21 MG/DL (ref 6–20)
BUN/CREAT SERPL: 25.6 (ref 7–25)
CALCIUM SPEC-SCNC: 9.6 MG/DL (ref 8.6–10.5)
CHLORIDE SERPL-SCNC: 103 MMOL/L (ref 98–107)
CO2 SERPL-SCNC: 23.9 MMOL/L (ref 22–29)
CREAT SERPL-MCNC: 0.82 MG/DL (ref 0.57–1)
DEPRECATED RDW RBC AUTO: 46 FL (ref 37–54)
EGFRCR SERPLBLD CKD-EPI 2021: 83.5 ML/MIN/1.73
EOSINOPHIL # BLD AUTO: 0.2 10*3/MM3 (ref 0–0.4)
EOSINOPHIL NFR BLD AUTO: 3.6 % (ref 0.3–6.2)
ERYTHROCYTE [DISTWIDTH] IN BLOOD BY AUTOMATED COUNT: 13.5 % (ref 12.3–15.4)
GLOBULIN UR ELPH-MCNC: 2.4 GM/DL
GLUCOSE SERPL-MCNC: 120 MG/DL (ref 65–99)
HCT VFR BLD AUTO: 41.1 % (ref 34–46.6)
HGB BLD-MCNC: 14 G/DL (ref 12–15.9)
IMM GRANULOCYTES # BLD AUTO: 0.02 10*3/MM3 (ref 0–0.05)
IMM GRANULOCYTES NFR BLD AUTO: 0.4 % (ref 0–0.5)
LYMPHOCYTES # BLD AUTO: 1.02 10*3/MM3 (ref 0.7–3.1)
LYMPHOCYTES NFR BLD AUTO: 18.2 % (ref 19.6–45.3)
MCH RBC QN AUTO: 31.5 PG (ref 26.6–33)
MCHC RBC AUTO-ENTMCNC: 34.1 G/DL (ref 31.5–35.7)
MCV RBC AUTO: 92.6 FL (ref 79–97)
MONOCYTES # BLD AUTO: 0.78 10*3/MM3 (ref 0.1–0.9)
MONOCYTES NFR BLD AUTO: 14 % (ref 5–12)
NEUTROPHILS NFR BLD AUTO: 3.54 10*3/MM3 (ref 1.7–7)
NEUTROPHILS NFR BLD AUTO: 63.3 % (ref 42.7–76)
NRBC BLD AUTO-RTO: 0 /100 WBC (ref 0–0.2)
PLATELET # BLD AUTO: 336 10*3/MM3 (ref 140–450)
PMV BLD AUTO: 8.3 FL (ref 6–12)
POTASSIUM SERPL-SCNC: 4.5 MMOL/L (ref 3.5–5.2)
PROT SERPL-MCNC: 6.9 G/DL (ref 6–8.5)
RBC # BLD AUTO: 4.44 10*6/MM3 (ref 3.77–5.28)
SODIUM SERPL-SCNC: 138 MMOL/L (ref 136–145)
WBC NRBC COR # BLD AUTO: 5.59 10*3/MM3 (ref 3.4–10.8)

## 2025-05-23 PROCEDURE — 80053 COMPREHEN METABOLIC PANEL: CPT

## 2025-05-23 PROCEDURE — 36415 COLL VENOUS BLD VENIPUNCTURE: CPT

## 2025-05-23 PROCEDURE — 85025 COMPLETE CBC W/AUTO DIFF WBC: CPT

## 2025-05-23 RX ORDER — AMPICILLIN TRIHYDRATE 250 MG
500 CAPSULE ORAL DAILY
COMMUNITY

## 2025-05-23 NOTE — PROGRESS NOTES
Subjective   Sd Man is a 59 y.o. female.  Referred by Dr. Mccain for left breast invasive ductal carcinoma    History of Present Illness     Patient is a 59-year-old postmenopausal lady who presented with a palpable abnormality of the left breast She initially felt and March 2024.  Patient feels like the mass has increased in size since she initially palpated it.  She brought this to the attention of her gynecologist who ordered diagnostic imaging and further workup.    Patient denies any previous abnormal mammograms or breast biopsies.    She has a diagnosis of PCOS and possible diabetes.  Her last normal mammogram prior to the most recent one was in 2015.    Patient reports extremely stressful situation at home with 3 of her daughters having had mental health issues and her ex- with bipolar disorder.  She has been dealing with a lot of stress since 2017.  She currently works as a .  She was working at [employer] in the past but no longer there.    Patient currently sees [facility] and on medications for anxiety and depression.  She also sees a counselor on a regular basis and has an appointment coming up with them.    Her support system is mainly her parents who live in [place] and also she has a few friends here.  During the summer if she were to do chemotherapy she prefers to get that done in [place] and subsequently transferred care back to us.    4/9/2024-bilateral diagnostic mammogram  Breast that heterogeneously dense.  Finding 1 high density irregular mass measuring 20 mm with indistinct margins at 2:00 in the left breast, 11 cm from the nipple.  This indicates the palpable lump in the left breast.  No suspicious abnormalities of the right breast.    Left breast ultrasound  Finding 1.irregular solid mass measuring 20 x 17 x 20 mm in the posterior one third left breast, 2:00, 11 cm from the nipple.  Finding 2.lymph node measures 20 x 9 x 13 mm on the left  breast.  4 mm cortical thickness noted.    Impression  Finding 1.solid mass in the left breast is suspicious, ultrasound-guided biopsy recommended  Finding 2.lymph node in the left breast is suspicious, FNA and possible spring-loaded or limited core biopsy is recommended.    4/16/2024-left breast ultrasound-guided biopsy  1.left breast 2:00-invasive ductal carcinoma  Grade 3  Invasive carcinoma involves multiple tissue cores and measures up to 14 mm  ER +91 to 100% strong  OK +31 to 40% moderate  HER2 negative, 1+  Ki-67 95%    2.lymph node of the left axilla with free-floating fragments of carcinoma, suspicious for metastasis.    5/10/2024-bilateral breast MRI  Biopsy-proven malignancy in the left breast measuring 2.7 cm at 3:00.  No other suspicious findings are seen.  Left axilla lymph node has undergone prior biopsy and shows diffuse cortical thickening suspicious for left axilla lymphadenopathy.  No suspicious abnormalities of the right breast.    5/23/2024-CT of the chest abdomen and pelvis  1.bone island in the right iliac wing, subchondral cyst in the left acetabulum.  2.2 0.4 cm rounded density in the lateral aspect of the left breast with a surgical marker.  2.4 cm left axillary lymph node.  3.12 millimeters subcapsular cyst in the left lobe of the liver, attenuation compatible with serous fluid.  Second likely cyst within the right lobe of the liver measuring 15 mm.  Attenuation compatible with slightly complex fluid.  No grossly suspicious liver lesion identified.  Further imaging of the liver with an ultrasound recommended.  4.no clear evidence of metastatic disease.    5/23/2024-bone scan without any evidence of metastatic disease    Patient presents today to discuss neoadjuvant chemotherapy given the high-grade and high Ki-67 of the tumor although the tumor is strongly ER positive.    She is extremely reluctant to the idea of receiving chemotherapy as she is concerned about feeling sick and she wants  to work for at least 1 year.    Oncotype DX recurrence score on the core biopsy returned at 73 with a greater than 32% risk of distant metastasis with AI or tamoxifen alone.  Significant benefit from chemotherapy noted.    Patient had moved to [place] after the initial diagnosis and initiated neoadjuvant chemotherapy with dose dense Adriamycin and Cytoxan under the care of  at [facility] oncology in [place], [place].  She completed the 4 cycles of dose dense Adriamycin and Cytoxan and currently receiving weekly Taxol.  Chemotherapy initiated on 7/12/2024.  Patient has not had any chemotherapy dose delays or interruptions.    Completed adjuvant radiation to the left breast in March 2025    Initiated anastrozole March 2020    Interval history  Sd returns today for follow-up.  She is reporting nausea for which she is having to take Zofran every day.  She takes the anastrozole in the morning.  She has not tried taking at different times of the day.  Also reporting some mild arthralgias.  She called us a few weeks ago complaining of left breast erythema and warmth.  She sent us pictures on [facility] and they were consistent with lymphedema.  She was recommended to start using a compression bra, follow-up back with lymphedema clinic and also contact Dr. Mccain's office.  She has been evaluated by lymphedema clinic and shown how to do the lymphatic massages as well as a swell patch has been provided.  There to the left left breast has improved significantly.        The following portions of the patient's history were reviewed and updated as appropriate: allergies, current medications, past family history, past medical history, past social history, past surgical history, and problem list.    Past Medical History:   Diagnosis Date    ADHD     Anxiety     Breast cancer     Breast cancer 2024    LEFT    Depression     History of chemotherapy     COMPLETED NOV 2024    Hypoxemia associated with sleep     Insulin  resistance     Metabolic syndrome X     Obesity 2012    CHELO on CPAP 2024    Home sleep study.  Weight 242 pounds.  Moderate CHELO with AHI 19.4 events per hour.  Low oxygen saturation 66% and sleep-related hypoxia abnormal for 43.4 minutes.    PCOS (polycystic ovarian syndrome)     PTSD (post-traumatic stress disorder)         Past Surgical History:   Procedure Laterality Date    BREAST LUMPECTOMY WITH SENTINEL NODE BIOPSY Left 2024    Procedure: Left partial mastectomy, wire localized, left sentinel lymph node biopsy, targeted axillary dissection - wire localized (intra-operative);  Surgeon: Moira Mccain MD;  Location: [facility];  Service: General;  Laterality: Left;    COLONOSCOPY      OOPHORECTOMY Bilateral 2014    VENOUS ACCESS DEVICE (PORT) INSERTION Right 2024    Procedure: INSERTION VENOUS ACCESS DEVICE;  Surgeon: Moira Mccain MD;  Location: [facility] OR;  Service: General;  Laterality: Right;    VENOUS ACCESS DEVICE (PORT) REMOVAL Right 2025    Procedure: REMOVAL VENOUS ACCESS DEVICE;  Surgeon: Moira Mccain MD;  Location: [facility] OR;  Service: General;  Laterality: Right;        Family History   Problem Relation Age of Onset    Melanoma Mother 75        alive currently 79    Atrial fibrillation Father     Hypertension Father     Malig Hyperthermia Neg Hx         Social History     Socioeconomic History    Marital status:    Tobacco Use    Smoking status: Never     Passive exposure: Never    Smokeless tobacco: Never   Vaping Use    Vaping status: Never Used   Substance and Sexual Activity    Alcohol use: Never    Drug use: Never    Sexual activity: Not Currently        OB History    No obstetric history on file.      Age at menarche-14  Age at first live childbirth-32   3 para 3  2  Age at menopause-47  Breast-feeding for 2 to 3 years  Oral contraceptive pill use none  Hormone replacement therapy for 2 years    Allergies   Allergen  "Reactions    Pepcid [Famotidine] Nausea And Vomiting          Objective   Blood pressure 123/84, pulse 100, temperature 98.2 °F (36.8 °C), temperature source Oral, height 162.6 cm (64.02\"), weight 109 kg (241 lb 4.8 oz), SpO2 99%.     Physical Exam  Vitals reviewed.   Constitutional:       Appearance: Normal appearance. She is obese.   HENT:      Nose: Nose normal.      Mouth/Throat:      Pharynx: Oropharynx is clear.   Eyes:      Conjunctiva/sclera: Conjunctivae normal.   Cardiovascular:      Rate and Rhythm: Normal rate and regular rhythm.   Pulmonary:      Effort: Pulmonary effort is normal.   Abdominal:      General: Abdomen is flat.   Musculoskeletal:         General: Normal range of motion.      Cervical back: Normal range of motion.   Skin:     General: Skin is warm and dry.   Neurological:      General: No focal deficit present.      Mental Status: She is alert and oriented to person, place, and time.   Psychiatric:         Mood and Affect: Mood normal.         Behavior: Behavior normal.         Thought Content: Thought content normal.         Judgment: Judgment normal.         Breast exam:  Right breast: Appears normal on inspection.  No palpable abnormalities of the right breast.  Left breast: Status post left breast lumpectomy with incision in the upper outer quadrant healing well.    I have reexamined the patient and the results are consistent with the previously documented exam. Kristan Chawla MD      Labs:                        No radiology results for the last 30 days.       Assessment & Plan   *Left breast invasive ductal carcinoma  Clinical T2 N1a M0, Anatomic stage IIb, prognostic stage IIa.  The tumor is grade 3, Ki-67 95%, ER +91 to 100% strong, IA +31 to 40% moderate.  Oncotype DX recurrence score of the tumor noted to be 73 with a greater than 32% risk of distant metastasis with AI or tamoxifen alone.  Significant benefit from chemotherapy.  Initiated dose dense Adriamycin and Cytoxan on " 7/12/2024 at [facility] [place] [facility] under the care of Dr. Cash  Noted to have a significant response in the breast after initiation of neoadjuvant chemotherapy.  11/21/2024-week 12 of Taxol.  Taxol dose will be reduced by 20% due to neuropathy in the bottom of her feet.  12/19/2024-Left breast lumpectomy with sentinel lymph node biopsy-noted to have a pathological complete response with 4 benign sentinel lymph nodes.  Treatment effect noted.  mL pain grade 5.  RCB 0.  Given that she has a pathological complete response the benefit of adjuvant CDK 4 6 inhibitors is uncertain.  Based on the preop pathology CDK 4 6 inhibitors will be indicated but given the excellent response I do not feel strongly about either Verzenio or Ribociclib.  Given that the tumor was ER/NJ positive we will proceed with adjuvant endocrine therapy with anastrozole 1 mg p.o. daily.  Completed adjuvant radiation to the left breast.  Started anastrozole March 2025.  Experiencing some arthralgias and nausea secondary to anastrozole    *Left breast lymphedema  Continue close follow-up with lymphedema clinic.  Continue follow-up with Dr. Mccain.  Continue lymphatic massages as well as swell spot.    *Bone health  DEXA performed in October 2024 at [place].  Will need to obtain results.  Per patient report DEXA was normal.  Zometa to start after dental clearance    *Anemia  Heme globin normal today    *Severe anxiety and suicidal thoughts  She is a current patient of [facility].  She sees a psychiatric nurse practitioner as well as a counselor and has an appointment with them.  Currently on Trintellix 10 mg.  Severe stressors as reported in the HPI.  11/7/2024: Reports increased stress and anxiety related to the recent election.  Mood stable    *Obesity  Class III  BMI 41.4  Recommend moderate intensity exercises as well as dietary alterations.  Patient has lost some weight recently due to ongoing nausea which she attributes to  stress.  She has been using Zofran to help with the nausea    *Elevated Liver enzymes  11/26/2024 LFTs stable with the ALT of 46, AST 33    *Possible diabetes-she will require follow-up with her primary care physician for management of the same.  Has an appointment with new PCP.    *Cardiac health-she has seen Dr. 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

## 2025-05-28 ENCOUNTER — APPOINTMENT (OUTPATIENT)
Dept: PHYSICAL THERAPY | Facility: HOSPITAL | Age: 60
End: 2025-05-28
Payer: COMMERCIAL

## 2025-06-03 ENCOUNTER — RESULTS FOLLOW-UP (OUTPATIENT)
Dept: LAB | Facility: HOSPITAL | Age: 60
End: 2025-06-03
Payer: COMMERCIAL

## 2025-06-03 NOTE — TELEPHONE ENCOUNTER
Notiifed Sd by phone that her liver enzymes were slightly elevated and we will recheck them in September when she returns.  She voiced understanding.

## 2025-06-03 NOTE — TELEPHONE ENCOUNTER
----- Message from Kristan Chawla sent at 6/3/2025  1:53 PM EDT -----  LFTs are abnormal  We need to recheck labs when she comes back   ----- Message -----  From: Lab, Background User  Sent: 5/23/2025  12:37 PM EDT  To: Kristan Chawla MD

## 2025-06-06 ENCOUNTER — OFFICE VISIT (OUTPATIENT)
Dept: CARDIOLOGY | Age: 60
End: 2025-06-06
Payer: COMMERCIAL

## 2025-06-06 ENCOUNTER — APPOINTMENT (OUTPATIENT)
Dept: WOMENS IMAGING | Facility: HOSPITAL | Age: 60
End: 2025-06-06
Payer: COMMERCIAL

## 2025-06-06 ENCOUNTER — TELEPHONE (OUTPATIENT)
Dept: CARDIOLOGY | Age: 60
End: 2025-06-06

## 2025-06-06 VITALS
DIASTOLIC BLOOD PRESSURE: 82 MMHG | BODY MASS INDEX: 40.29 KG/M2 | WEIGHT: 236 LBS | HEIGHT: 64 IN | HEART RATE: 83 BPM | SYSTOLIC BLOOD PRESSURE: 120 MMHG

## 2025-06-06 DIAGNOSIS — Z51.11 ENCOUNTER FOR ANTINEOPLASTIC CHEMOTHERAPY: Primary | ICD-10-CM

## 2025-06-06 DIAGNOSIS — C50.412 MALIGNANT NEOPLASM OF UPPER-OUTER QUADRANT OF LEFT BREAST IN FEMALE, ESTROGEN RECEPTOR POSITIVE: ICD-10-CM

## 2025-06-06 DIAGNOSIS — Z17.0 MALIGNANT NEOPLASM OF UPPER-OUTER QUADRANT OF LEFT BREAST IN FEMALE, ESTROGEN RECEPTOR POSITIVE: ICD-10-CM

## 2025-06-06 DIAGNOSIS — G47.33 OSA ON CPAP: ICD-10-CM

## 2025-06-06 PROCEDURE — 77063 BREAST TOMOSYNTHESIS BI: CPT | Performed by: RADIOLOGY

## 2025-06-06 PROCEDURE — 99214 OFFICE O/P EST MOD 30 MIN: CPT | Performed by: INTERNAL MEDICINE

## 2025-06-06 PROCEDURE — 93000 ELECTROCARDIOGRAM COMPLETE: CPT | Performed by: INTERNAL MEDICINE

## 2025-06-06 PROCEDURE — 77067 SCR MAMMO BI INCL CAD: CPT | Performed by: RADIOLOGY

## 2025-06-06 NOTE — PROGRESS NOTES
"Date of Office Visit: 2025  Encounter Provider: Nicolette Syed MD  Place of Service: [facility] CARDIOLOGY  Patient Name: Sd Man  :1965    Chief complaint  Cardio oncology care.    History of Present Illness  Patient is a 59-year-old female with history of ADHD, dyslipidemia, obstructive sleep apnea (on CPAP) depression, obesity, polycystic ovarian disease, elevated glucose, posttraumatic stress disorder diagnosed with left-sided breast cancer 2024.  She completed AC therapy 2024 in [place].  Noted to have significant response.  She continues on Taxol therapy at unknown how much exposure to Adriamycin she had.  She was also diagnosed with sleep apnea and is on CPAP therapy for this.  She had a lumpectomy on 2024.  She was treated with anastrozole and completed radiation therapy of the left breast.  She has developed some lymphedema of the left breast.    Baseline echo 2024 with ejection fraction 65%, GLS -19.6% with grade 1 diastolic dysfunction trivial valve regurgitation.  There was moderate left ventricular hypertrophy.On 2024 with complaints of chest pain shortness of breath and following chemotherapy she had a stress echocardiogram that showed an ejection fraction 56.5% with GLS -21.5% with moderate left ventricular pretrip he grade 1 diastolic dysfunction Limited imaging of cardiac valves with no ischemia at 5 minutes.    Since last visit she is using CPAP consistently.  She has had no palpitation shortness of breath dizziness chest pain.  She has had lymphedema around the left breast.  She is doing a fitness program through the \"[employer]  Patient states that over the past 3 weeks has made significant lifestyle changes in terms of diet regarding carbohydrates and salt.    Past Medical History:   Diagnosis Date    ADHD     Anxiety     Arthritis     Breast cancer     Breast cancer     LEFT    Depression     History of chemotherapy     " COMPLETED NOV 2024    Hyperlipidemia     Hypoxemia associated with sleep     Insulin resistance     Metabolic syndrome X     Obesity 11/11/2012    CHELO on CPAP 06/17/2024    Home sleep study.  Weight 242 pounds.  Moderate CHELO with AHI 19.4 events per hour.  Low oxygen saturation 66% and sleep-related hypoxia abnormal for 43.4 minutes.    PCOS (polycystic ovarian syndrome)     PTSD (post-traumatic stress disorder)      Past Surgical History:   Procedure Laterality Date    APPENDECTOMY      BREAST LUMPECTOMY WITH SENTINEL NODE BIOPSY Left 12/19/2024    Procedure: Left partial mastectomy, wire localized, left sentinel lymph node biopsy, targeted axillary dissection - wire localized (intra-operative);  Surgeon: Moira Mccain MD;  Location: [facility] OR;  Service: General;  Laterality: Left;    COLONOSCOPY      EYE SURGERY  2000    lasik    OOPHORECTOMY Bilateral 01/01/2014    VENOUS ACCESS DEVICE (PORT) INSERTION Right 06/20/2024    Procedure: INSERTION VENOUS ACCESS DEVICE;  Surgeon: Moira Mccain MD;  Location: [facility] OR;  Service: General;  Laterality: Right;    VENOUS ACCESS DEVICE (PORT) REMOVAL Right 01/24/2025    Procedure: REMOVAL VENOUS ACCESS DEVICE;  Surgeon: Moira Mccain MD;  Location: [facility] OR;  Service: General;  Laterality: Right;    WISDOM TOOTH EXTRACTION       Outpatient Medications Prior to Visit   Medication Sig Dispense Refill    amphetamine-dextroamphetamine (ADDERALL) 12.5 MG tablet Take 1 tablet by mouth Every Night. 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 Takes occasionally)      anastrozole (ARIMIDEX) 1 MG tablet Take 1 tablet by mouth Daily. 30 tablet 5    B Complex-C (B-complex with vitamin C) tablet Take 1 tablet by mouth Daily. HOLD PER MD INSTR      Cholecalciferol (D3 VITAMIN PO) Take  by mouth. HOLD FOR SURGERY      Cinnamon 500 MG capsule Take 1 capsule by mouth Daily.      metFORMIN ER  (GLUCOPHAGE-XR) 500 MG 24 hr tablet Take 1 tablet by mouth Daily With Dinner.      ondansetron (ZOFRAN) 8 MG tablet Take 1 tablet by mouth Every 8 (Eight) Hours As Needed for Nausea or Vomiting for up to 60 doses. 60 tablet 0    Vortioxetine HBr (TRINTELLIX) 20 MG tablet Take 1 tablet by mouth Daily.      azSTARys 39.2-7.8 MG capsule Take 1 tablet by mouth Daily. PT TO HOLD 48 HOURS PRIOR TO SURGERY (Patient not taking: Reported on 6/6/2025)      melatonin 5 MG tablet tablet Take 2 tablets by mouth At Night As Needed. (Patient not taking: Reported on 6/6/2025)       No facility-administered medications prior to visit.       Allergies as of 06/06/2025 - Reviewed 06/06/2025   Allergen Reaction Noted    Pepcid [famotidine] Nausea And Vomiting 12/19/2024     Social History     Socioeconomic History    Marital status:    Tobacco Use    Smoking status: Never     Passive exposure: Never    Smokeless tobacco: Never   Vaping Use    Vaping status: Never Used   Substance and Sexual Activity    Alcohol use: Not Currently    Drug use: Never    Sexual activity: Not Currently     Family History   Problem Relation Age of Onset    Melanoma Mother 75        alive currently 79    Uterine cancer Mother     Skin cancer Mother     Atrial fibrillation Father     Hypertension Father     Hearing loss Father     Bipolar disorder Daughter     COPD Maternal Grandmother     Depression Maternal Grandmother     Lung cancer Maternal Grandfather     Diabetes Paternal Grandmother     Depression Paternal Grandmother     Diabetes Paternal Grandfather     Asthma Daughter         Donovan    Malig Hyperthermia Neg Hx      Review of Systems   Constitutional: Negative for chills, fever, weight gain and weight loss.   Cardiovascular:  Negative for leg swelling.   Respiratory:  Negative for cough, snoring and wheezing.    Hematologic/Lymphatic: Negative for bleeding problem. Does not bruise/bleed easily.   Skin:  Negative for color change.  "  Musculoskeletal:  Positive for joint pain. Negative for falls and myalgias.   Gastrointestinal:  Negative for melena.   Genitourinary:  Negative for hematuria.   Neurological:  Negative for excessive daytime sleepiness.   Psychiatric/Behavioral:  Negative for depression. The patient is not nervous/anxious.         Objective:     Vitals:    06/06/25 1110   BP: 120/82   Pulse: 83   Weight: 107 kg (236 lb)   Height: 162.6 cm (64\")     Body mass index is 40.51 kg/m².    Vitals reviewed.   Constitutional:       Appearance: Well-developed. Morbidly obese.   Eyes:      General: No scleral icterus.        Right eye: No discharge.      Conjunctiva/sclera: Conjunctivae normal.      Pupils: Pupils are equal, round, and reactive to light.   HENT:      Head: Normocephalic.      Nose: Nose normal.   Neck:      Thyroid: No thyromegaly.      Vascular: No JVD.   Pulmonary:      Effort: Pulmonary effort is normal. No respiratory distress.      Breath sounds: Normal breath sounds. No wheezing. No rales.   Cardiovascular:      Normal rate. Regular rhythm. Normal S1. Normal S2.       Murmurs: There is no murmur.      No gallop.    Pulses:     Carotid: 2+ bilaterally.     Radial: 2+ bilaterally.     Dorsalis pedis: 2+ bilaterally.     Posterior tibial: 2+ bilaterally.  Edema:     Peripheral edema absent.   Abdominal:      General: Bowel sounds are normal. There is no distension.      Palpations: Abdomen is soft.      Tenderness: There is no abdominal tenderness. There is no rebound.   Musculoskeletal: Normal range of motion.         General: No tenderness.      Cervical back: Normal range of motion and neck supple. Skin:     General: Skin is warm and dry.      Findings: No erythema or rash.   Neurological:      Mental Status: Alert and oriented to person, place, and time.   Psychiatric:         Behavior: Behavior normal.         Thought Content: Thought content normal.         Judgment: Judgment normal.       Lab Review:   Lab Results " - Last 18 Months   Lab Units 05/23/25  1215 01/02/25  0850   WBC 10*3/mm3 5.59 5.92   RBC 10*6/mm3 4.44 4.37   HEMOGLOBIN g/dL 14.0 13.6   HEMATOCRIT % 41.1 41.9   MCV fL 92.6 95.9   MCH pg 31.5 31.1   MCHC g/dL 34.1 32.5   RDW % 13.5 13.0   PLATELETS 10*3/mm3 336 323   NEUTROPHIL % % 63.3 49.2   LYMPHOCYTE % % 18.2* 36.5   MONOCYTES % % 14.0* 10.5   EOSINOPHIL % % 3.6 2.9   BASOPHIL % % 0.5 0.7   NEUTROS ABS 10*3/mm3 3.54 2.92   LYMPHS ABS 10*3/mm3 1.02 2.16   MONOS ABS 10*3/mm3 0.78 0.62   EOS ABS 10*3/mm3 0.20 0.17   BASOS ABS 10*3/mm3 0.03 0.04   RDW-SD fl 46.0 45.9   MPV fL 8.3 9.4       Lab Results - Last 18 Months   Lab Units 05/23/25  1215 01/02/25  0850   GLUCOSE mg/dL 120* 149*   BUN mg/dL 21* 18   CREATININE mg/dL 0.82 0.90   SODIUM mmol/L 138 142   POTASSIUM mmol/L 4.5 3.7   CHLORIDE mmol/L 103 106   CO2 mmol/L 23.9 24.0   CALCIUM mg/dL 9.6 9.3   TOTAL PROTEIN g/dL 6.9 6.6   ALBUMIN g/dL 4.5 4.4   ALT (SGPT) U/L 52* 38*   AST (SGOT) U/L 37* 28   ALK PHOS U/L 82 89   BILIRUBIN mg/dL 0.5 0.5   GLOBULIN gm/dL 2.4 2.2   A/G RATIO g/dL 1.9 2.0   BUN / CREAT RATIO  25.6* 20.0   ANION GAP mmol/L 11.1 12.0   EGFR mL/min/1.73 83.5 74.7       ECG 12 Lead    Date/Time: 6/6/2025 11:33 AM  Performed by: Nicolette Syed MD    Authorized by: Nicolette Syed MD  Comparison: compared with previous ECG   Rhythm: sinus rhythm  Other findings: low voltage    Clinical impression: abnormal EKG            Diagnosis Plan   1. Encounter for antineoplastic chemotherapy  ECG 12 Lead    Adult Transthoracic Echo Complete W/ Cont if Necessary Per Protocol      2. Malignant neoplasm of upper-outer quadrant of left breast in female, estrogen receptor positive        3. CHELO on CPAP          Plan:       1.  Chest pain.  Resolved with negative stress echo 11/2024   3.  Dyspnea.  Resolved  4.  Left-sided breast cancer.  Received AC therapy with Taxol therapy.  Also received left-sided radiation obstructive sleep apnea, on CPAP therapy  2.   Cardio oncology care.  Recheck echo now following AC therapy  3.  Obstructive sleep apnea. On CPAP.  4.  ADHD  5.  History of depression with suicidal ideation.  6.  Dyslipidemia.  She will plans to work on additional lifestyle changes and we will check labs in several weeks.  7.  Prediabetes.  Started on metformin and being addressed by Dr. Dillon  8.  Elevated liver enzymes will follow-up with Dr. Dillon regarding this    Time Spent: I spent 35 minutes caring for Sd on this date of service. This time includes time spent by me in the following activities: preparing for the visit, reviewing tests, obtaining and/or reviewing a separately obtained history, performing a medically appropriate examination and/or evaluation, counseling and educating the patient/family/caregiver, ordering medications, tests, or procedures, documenting information in the medical record, and independently interpreting results and communicating that information with the patient/family/caregiver.   I spent 1 minutes on the separately reported service of ECG. This time is not included in the time used to support the E/M service also reported today.        Your medication list            Accurate as of June 6, 2025 11:59 PM. If you have any questions, ask your nurse or doctor.                CHANGE how you take these medications        Instructions Last Dose Given Next Dose Due   amphetamine-dextroamphetamine 12.5 MG tablet  Commonly known as: ADDERALL  What changed: See the new instructions.      Take 1 tablet by mouth Every Night. 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       B-complex with vitamin C tablet      Take 1 tablet by mouth Daily. HOLD PER MD LEZAMA       Cinnamon 500 MG capsule      Take 1 capsule by mouth Daily.       D3  VITAMIN PO      Take  by mouth. HOLD FOR SURGERY       metFORMIN  MG 24 hr tablet  Commonly known as: GLUCOPHAGE-XR      Take 1 tablet by mouth Daily With Dinner.       ondansetron 8 MG tablet  Commonly known as: ZOFRAN      Take 1 tablet by mouth Every 8 (Eight) Hours As Needed for Nausea or Vomiting for up to 60 doses.       Vortioxetine HBr 20 MG tablet  Commonly known as: TRINTELLIX      Take 1 tablet by mouth Daily.                Patient is no longer taking -.  I corrected the med list to reflect this.  I did not stop these medications.      Dictated utilizing Dragon dictation

## 2025-06-06 NOTE — TELEPHONE ENCOUNTER
LM with Dr Gilliam at [facility] re: Please fax copy of Dosimetry report.  We need to know the mean heart dose the pt received.

## 2025-06-11 ENCOUNTER — TELEPHONE (OUTPATIENT)
Dept: CARDIOLOGY | Age: 60
End: 2025-06-11
Payer: COMMERCIAL

## 2025-06-11 NOTE — TELEPHONE ENCOUNTER
Received the Mean Heart Dose from [facility].  I placed a copy on your desk.    Mean Heart Dose was 2.6 Gy.

## 2025-06-12 ENCOUNTER — HOSPITAL ENCOUNTER (OUTPATIENT)
Dept: CARDIOLOGY | Facility: HOSPITAL | Age: 60
Discharge: HOME | End: 2025-06-12
Admitting: INTERNAL MEDICINE
Payer: COMMERCIAL

## 2025-06-12 ENCOUNTER — HOSPITAL ENCOUNTER (OUTPATIENT)
Dept: PHYSICAL THERAPY | Facility: HOSPITAL | Age: 60
Setting detail: THERAPIES SERIES
Discharge: HOME | End: 2025-06-12
Payer: COMMERCIAL

## 2025-06-12 VITALS
OXYGEN SATURATION: 98 % | HEIGHT: 64 IN | DIASTOLIC BLOOD PRESSURE: 80 MMHG | HEART RATE: 70 BPM | WEIGHT: 236 LBS | BODY MASS INDEX: 40.29 KG/M2 | SYSTOLIC BLOOD PRESSURE: 130 MMHG

## 2025-06-12 DIAGNOSIS — I89.0 LYMPHEDEMA OF BREAST: ICD-10-CM

## 2025-06-12 DIAGNOSIS — Z51.11 ENCOUNTER FOR ANTINEOPLASTIC CHEMOTHERAPY: ICD-10-CM

## 2025-06-12 DIAGNOSIS — Z98.890 S/P LUMPECTOMY, LEFT BREAST: ICD-10-CM

## 2025-06-12 DIAGNOSIS — C50.412 MALIGNANT NEOPLASM OF UPPER-OUTER QUADRANT OF LEFT BREAST IN FEMALE, ESTROGEN RECEPTOR POSITIVE: Primary | ICD-10-CM

## 2025-06-12 DIAGNOSIS — Z17.0 MALIGNANT NEOPLASM OF UPPER-OUTER QUADRANT OF LEFT BREAST IN FEMALE, ESTROGEN RECEPTOR POSITIVE: Primary | ICD-10-CM

## 2025-06-12 PROCEDURE — 93356 MYOCRD STRAIN IMG SPCKL TRCK: CPT

## 2025-06-12 PROCEDURE — 93306 TTE W/DOPPLER COMPLETE: CPT

## 2025-06-12 PROCEDURE — 97140 MANUAL THERAPY 1/> REGIONS: CPT

## 2025-06-12 PROCEDURE — 25510000001 PERFLUTREN 6.52 MG/ML SUSPENSION 2 ML VIAL: Performed by: INTERNAL MEDICINE

## 2025-06-12 RX ADMIN — PERFLUTREN 1.5 ML: 6.52 INJECTION, SUSPENSION INTRAVENOUS at 12:54

## 2025-06-12 NOTE — THERAPY TREATMENT NOTE
Outpatient Physical Therapy Lymphedema Treatment Note  [facility]     Patient Name: Sd Man  : 1965  MRN: [id number]  Today's Date: 2025        Visit Date: 2025    Visit Dx:    ICD-10-CM ICD-9-CM   1. Malignant neoplasm of upper-outer quadrant of left breast in female, estrogen receptor positive  C50.412 174.4    Z17.0 V86.0   2. S/P lumpectomy, left breast  Z98.890 V45.89   3. Lymphedema of breast  I89.0 457.1       Patient Active Problem List   Diagnosis    Attention deficit hyperactivity disorder (ADHD), predominantly inattentive type    Hypercholesterolemia with hypertriglyceridemia    Insulin resistance    Metabolic syndrome    Class 3 severe obesity due to excess calories with serious comorbidity and body mass index (BMI) of 40.0 to 44.9 in adult    PCOS (polycystic ovarian syndrome)    Positive colorectal cancer screening using Cologuard test    Vitamin D deficiency    Malignant neoplasm of upper-outer quadrant of left breast in female, estrogen receptor positive    Encounter for fitting and adjustment of vascular catheter    CHELO on CPAP    Hypoxemia associated with sleep    Hypersomnia due to medical condition    Jennifer's deformity of left heel    Calcific Achilles tendonitis    Closed nondisplaced fracture of proximal phalanx of lesser toe of left foot    Arthritis of first metatarsophalangeal (MTP) joint of left foot    Hallux valgus of left foot    Port-A-Cath in place         Lymphedema       Row Name 25 1200             Subjective Pain    Able to rate subjective pain? yes  -LB      Pre-Treatment Pain Level 0  -LB      Post-Treatment Pain Level 0  -LB         Subjective    Subjective Comments I am doing well. I am doing the massage twice a day.  -LB                User Key  (r) = Recorded By, (t) = Taken By, (c) = Cosigned By      Initials Name Provider Type    Glenna Colin, PT Physical Therapist                                   PT Assessment/Plan       Row Name  06/12/25 1253          PT Assessment    Assessment Comments Pt returns reporting good tolerance to MLD, compression bra, swell spot. She does demonstrate mild improvement in pore size but dramatic improvement in scar tissue lateral L breast. We reviewed self MLD and considerations for management.  -LB        PT Plan    PT Plan Comments continue MLD as needed, repeat bioimpedance next week  -LB               User Key  (r) = Recorded By, (t) = Taken By, (c) = Cosigned By      Initials Name Provider Type    Glenna Colin, PT Physical Therapist                        OP Exercises       Row Name 06/12/25 1200 06/12/25 1100          Subjective    Subjective Comments I am doing well. I am doing the massage twice a day.  -LB --        Subjective Pain    Able to rate subjective pain? yes  -LB --     Pre-Treatment Pain Level 0  -LB --     Post-Treatment Pain Level 0  -LB --        Total Minutes    30533 - PT Manual Therapy Minutes -- 30  -LB               User Key  (r) = Recorded By, (t) = Taken By, (c) = Cosigned By      Initials Name Provider Type    Glenna Colin, PT Physical Therapist                            Manual Rx (Last 36 Hours)       Manual Treatments       Row Name 06/12/25 1100             Total Minutes    05041 - PT Manual Therapy Minutes 30  -LB         Manual Rx 1    Manual Rx 1 Location L breast MLD, L shoulder PROM  -LB      Manual Rx 1 Type gentle MLD, demo and education  -LB      Manual Rx 1 Duration 30  -LB                User Key  (r) = Recorded By, (t) = Taken By, (c) = Cosigned By      Initials Name Provider Type    Glenna Colin, PT Physical Therapist                     PT OP Goals       Row Name 06/12/25 1200          PT Short Term Goals    STG Date to Achieve 06/03/25  -LB     STG 1 Pt will be compliant with self MLD, use of compression bra/swell spot for night time, avoiding L sided sleeping position.  -LB     STG 1 Progress Met  -LB        Long Term Goals    LTG Date to Achieve  02/21/25  -LB     LTG 1 Pt will maintain LDex bioimpedance score WNL.  -LB     LTG 1 Progress Ongoing  -LB     LTG 2 Pt will verbalize s/s of lymphedema and steps to prevention.  -LB     LTG 2 Progress Ongoing  -LB     LTG 3 Pt will demonstrate independence with management of L breast lymphedema.  -LB     LTG 3 Progress Ongoing  -LB               User Key  (r) = Recorded By, (t) = Taken By, (c) = Cosigned By      Initials Name Provider Type    Glenna Colin, PT Physical Therapist                    Therapy Education  Education Details: reviewed MLD, compression bra use, swell spot  Given: Symptoms/condition management, Edema management  Program: Reinforced  How Provided: Verbal, Demonstration  Provided to: Patient  Level of Understanding: Verbalized              Time Calculation:   Start Time: 0930  Stop Time: 1000  Time Calculation (min): 30 min  Total Timed Code Minutes- PT: 30 minute(s)  Timed Charges  83698 - PT Manual Therapy Minutes: 30  Total Minutes  Timed Charges Total Minutes: 30   Total Minutes: 30   Therapy Charges for Today       Code Description Service Date Service Provider Modifiers Qty    [id number] HC PT MANUAL THERAPY EA 15 MIN 6/12/2025 Glenna Martínez, PT GP 2                      Glenna Martínez PT  6/12/2025

## 2025-06-13 LAB
AORTIC ARCH: 2.2 CM
AORTIC DIMENSIONLESS INDEX: 0.76 (DI)
ASCENDING AORTA: 3.7 CM
AV MEAN PRESS GRAD SYS DOP V1V2: 3 MMHG
AV VMAX SYS DOP: 116 CM/SEC
BH CV ECHO LEFT VENTRICLE GLOBAL LONGITUDINAL STRAIN: -21.4 %
BH CV ECHO MEAS - ACS: 1.98 CM
BH CV ECHO MEAS - AO MAX PG: 5.4 MMHG
BH CV ECHO MEAS - AO ROOT DIAM: 3.4 CM
BH CV ECHO MEAS - AO V2 VTI: 19.3 CM
BH CV ECHO MEAS - AVA(I,D): 2.45 CM2
BH CV ECHO MEAS - EDV(CUBED): 64 ML
BH CV ECHO MEAS - EDV(MOD-SP2): 99 ML
BH CV ECHO MEAS - EDV(MOD-SP4): 93 ML
BH CV ECHO MEAS - EF(MOD-SP2): 61.6 %
BH CV ECHO MEAS - EF(MOD-SP4): 62.4 %
BH CV ECHO MEAS - ESV(CUBED): 18.1 ML
BH CV ECHO MEAS - ESV(MOD-SP2): 38 ML
BH CV ECHO MEAS - ESV(MOD-SP4): 35 ML
BH CV ECHO MEAS - FS: 34.3 %
BH CV ECHO MEAS - IVS/LVPW: 1.09 CM
BH CV ECHO MEAS - IVSD: 1.2 CM
BH CV ECHO MEAS - LAT PEAK E' VEL: 9.1 CM/SEC
BH CV ECHO MEAS - LV DIASTOLIC VOL/BSA (35-75): 44.3 CM2
BH CV ECHO MEAS - LV MASS(C)D: 155.4 GRAMS
BH CV ECHO MEAS - LV MAX PG: 3.2 MMHG
BH CV ECHO MEAS - LV MEAN PG: 2 MMHG
BH CV ECHO MEAS - LV SYSTOLIC VOL/BSA (12-30): 16.7 CM2
BH CV ECHO MEAS - LV V1 MAX: 90 CM/SEC
BH CV ECHO MEAS - LV V1 VTI: 14.6 CM
BH CV ECHO MEAS - LVIDD: 4 CM
BH CV ECHO MEAS - LVIDS: 2.6 CM
BH CV ECHO MEAS - LVOT AREA: 3.2 CM2
BH CV ECHO MEAS - LVOT DIAM: 2.03 CM
BH CV ECHO MEAS - LVPWD: 1.1 CM
BH CV ECHO MEAS - MED PEAK E' VEL: 8.7 CM/SEC
BH CV ECHO MEAS - MV A DUR: 0.11 SEC
BH CV ECHO MEAS - MV A MAX VEL: 81.8 CM/SEC
BH CV ECHO MEAS - MV DEC SLOPE: 459.2 CM/SEC2
BH CV ECHO MEAS - MV DEC TIME: 0.17 SEC
BH CV ECHO MEAS - MV E MAX VEL: 52.7 CM/SEC
BH CV ECHO MEAS - MV E/A: 0.64
BH CV ECHO MEAS - MV MAX PG: 3.1 MMHG
BH CV ECHO MEAS - MV MEAN PG: 1.49 MMHG
BH CV ECHO MEAS - MV P1/2T: 38.2 MSEC
BH CV ECHO MEAS - MV V2 VTI: 16 CM
BH CV ECHO MEAS - MVA(P1/2T): 5.8 CM2
BH CV ECHO MEAS - MVA(VTI): 3 CM2
BH CV ECHO MEAS - PA ACC TIME: 0.1 SEC
BH CV ECHO MEAS - PA V2 MAX: 107.2 CM/SEC
BH CV ECHO MEAS - QP/QS: 0.92
BH CV ECHO MEAS - RV MAX PG: 2.7 MMHG
BH CV ECHO MEAS - RV V1 MAX: 81.8 CM/SEC
BH CV ECHO MEAS - RV V1 VTI: 15.1 CM
BH CV ECHO MEAS - RVOT DIAM: 1.91 CM
BH CV ECHO MEAS - SUP REN AO DIAM: 2.3 CM
BH CV ECHO MEAS - SV(LVOT): 47.2 ML
BH CV ECHO MEAS - SV(MOD-SP2): 61 ML
BH CV ECHO MEAS - SV(MOD-SP4): 58 ML
BH CV ECHO MEAS - SV(RVOT): 43.4 ML
BH CV ECHO MEAS - SVI(LVOT): 22.5 ML/M2
BH CV ECHO MEAS - SVI(MOD-SP2): 29.1 ML/M2
BH CV ECHO MEAS - SVI(MOD-SP4): 27.6 ML/M2
BH CV ECHO MEAS - TAPSE (>1.6): 2.49 CM
BH CV ECHO MEASUREMENTS AVERAGE E/E' RATIO: 5.92
BH CV XLRA - RV BASE: 3.1 CM
BH CV XLRA - RV LENGTH: 6.5 CM
BH CV XLRA - RV MID: 2.13 CM
BH CV XLRA - TDI S': 15.7 CM/SEC
LEFT ATRIUM VOLUME INDEX: 17 ML/M2
LV EF BIPLANE MOD: 61.8 %
SINUS: 3.6 CM
STJ: 3 CM

## 2025-06-16 ENCOUNTER — OFFICE VISIT (OUTPATIENT)
Dept: SURGERY | Facility: CLINIC | Age: 60
End: 2025-06-16
Payer: COMMERCIAL

## 2025-06-16 VITALS
BODY MASS INDEX: 40.15 KG/M2 | SYSTOLIC BLOOD PRESSURE: 138 MMHG | HEART RATE: 103 BPM | DIASTOLIC BLOOD PRESSURE: 88 MMHG | OXYGEN SATURATION: 97 % | WEIGHT: 235.2 LBS | HEIGHT: 64 IN

## 2025-06-16 DIAGNOSIS — Z17.0 MALIGNANT NEOPLASM OF UPPER-OUTER QUADRANT OF LEFT BREAST IN FEMALE, ESTROGEN RECEPTOR POSITIVE: Primary | ICD-10-CM

## 2025-06-16 DIAGNOSIS — C50.412 MALIGNANT NEOPLASM OF UPPER-OUTER QUADRANT OF LEFT BREAST IN FEMALE, ESTROGEN RECEPTOR POSITIVE: Primary | ICD-10-CM

## 2025-06-16 NOTE — PROGRESS NOTES
Breast Surgery Follow Up Note    Oncologic History:  Sd Man is a 60 y.o. female with the following oncologic history:  Oncology/Hematology History   Malignant neoplasm of upper-outer quadrant of left breast in female, estrogen receptor positive   4/16/2024 Cancer Staged    Staging form: Breast, AJCC 8th Edition  - Clinical stage from 4/16/2024: Stage IIB (cT2, cN1(f), cM0, G3, ER+, LA+, HER2-) - Signed by Moira Mccain MD on 5/15/2024     4/30/2024 Initial Diagnosis    Malignant neoplasm of upper-outer quadrant of left breast in female, estrogen receptor positive     9/3/2024 - 11/21/2024 Chemotherapy    OP BREAST PACLitaxel Adjuvant (Weekly X 12)     12/19/2024 Cancer Staged    Staging form: Breast, AJCC 8th Edition  - Pathologic stage from 12/19/2024: ypT0, ypN0(sn), cM0, G3, ER+, LA+, HER2-, Oncotype DX score: 73 - Signed by Moira Mccain MD on 6/16/2025      - 3/1/2025 Radiation    Radiation OncologyTreatment Course:  Sd Man completed adjuvant radiation to the left breast in [place] - ended march 2025 9/25/2025 -  Chemotherapy    OP SUPPORTIVE Zoledronic Acid Q6M          Interval History: Sd is on AI tolerating OK, on zometa for support. She has ongoing left breast edema from surgery and radiation. Follows with lymphedema clinic. Has been working on adjusting her diet and increasing activity. She is interested in maintaining a healthier diet - minimizing processed foods, increasing antioxidants. 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 She is planning to go visit [place] over the summer for a holiday. She is looking forward to this vacation. Denies any unintentional weight loss, focal bone pain, persistent cough.     Past Medical History:   Diagnosis Date    ADHD     Anxiety     Arthritis     Breast cancer     Breast cancer 2024    LEFT    Depression     History of chemotherapy     COMPLETED NOV 2024    Hyperlipidemia     Hypoxemia associated with sleep     Insulin resistance      Metabolic syndrome X     Obesity 11/11/2012    CHELO on CPAP 06/17/2024    Home sleep study.  Weight 242 pounds.  Moderate CHELO with AHI 19.4 events per hour.  Low oxygen saturation 66% and sleep-related hypoxia abnormal for 43.4 minutes.    PCOS (polycystic ovarian syndrome)     PTSD (post-traumatic stress disorder)      Patient Active Problem List   Diagnosis    Attention deficit hyperactivity disorder (ADHD), predominantly inattentive type    Hypercholesterolemia with hypertriglyceridemia    Insulin resistance    Metabolic syndrome    Class 3 severe obesity due to excess calories with serious comorbidity and body mass index (BMI) of 40.0 to 44.9 in adult    PCOS (polycystic ovarian syndrome)    Positive colorectal cancer screening using Cologuard test    Vitamin D deficiency    Malignant neoplasm of upper-outer quadrant of left breast in female, estrogen receptor positive    Encounter for fitting and adjustment of vascular catheter    CHELO on CPAP    Hypoxemia associated with sleep    Hypersomnia due to medical condition    Jennifer's deformity of left heel    Calcific Achilles tendonitis    Closed nondisplaced fracture of proximal phalanx of lesser toe of left foot    Arthritis of first metatarsophalangeal (MTP) joint of left foot    Hallux valgus of left foot    Port-A-Cath in place     Current Outpatient Medications on File Prior to Visit   Medication Sig Dispense Refill    amphetamine-dextroamphetamine (ADDERALL) 12.5 MG tablet Take 1 tablet by mouth Every Night. 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 Takes occasionally)      anastrozole (ARIMIDEX) 1 MG tablet Take 1 tablet by mouth Daily. 30 tablet 5    B Complex-C (B-complex with vitamin C) tablet Take 1 tablet by mouth Daily. HOLD PER MD INSTR      Cholecalciferol (D3 VITAMIN PO) Take  by mouth. HOLD FOR SURGERY      Cinnamon 500 MG capsule Take 1 capsule by mouth Daily.       metFORMIN ER (GLUCOPHAGE-XR) 500 MG 24 hr tablet Take 1 tablet by mouth Daily With Dinner.      ondansetron (ZOFRAN) 8 MG tablet Take 1 tablet by mouth Every 8 (Eight) Hours As Needed for Nausea or Vomiting for up to 60 doses. 60 tablet 0    Vortioxetine HBr (TRINTELLIX) 20 MG tablet Take 1 tablet by mouth Daily.       No current facility-administered medications on file prior to visit.     Allergies   Allergen Reactions    Pepcid [Famotidine] Nausea And Vomiting       Past Surgical History:   Procedure Laterality Date    APPENDECTOMY      BREAST LUMPECTOMY WITH SENTINEL NODE BIOPSY Left 12/19/2024    Procedure: Left partial mastectomy, wire localized, left sentinel lymph node biopsy, targeted axillary dissection - wire localized (intra-operative);  Surgeon: Moira Mccain MD;  Location: [facility];  Service: General;  Laterality: Left;    COLONOSCOPY      EYE SURGERY  2000    lasik    OOPHORECTOMY Bilateral 01/01/2014    VENOUS ACCESS DEVICE (PORT) INSERTION Right 06/20/2024    Procedure: INSERTION VENOUS ACCESS DEVICE;  Surgeon: Moira Mccain MD;  Location: [facility] OR;  Service: General;  Laterality: Right;    VENOUS ACCESS DEVICE (PORT) REMOVAL Right 01/24/2025    Procedure: REMOVAL VENOUS ACCESS DEVICE;  Surgeon: Moira Mccain MD;  Location: [facility] OR;  Service: General;  Laterality: Right;    WISDOM TOOTH EXTRACTION       Social History     Socioeconomic History    Marital status:    Tobacco Use    Smoking status: Never     Passive exposure: Never    Smokeless tobacco: Never   Vaping Use    Vaping status: Never Used   Substance and Sexual Activity    Alcohol use: Not Currently    Drug use: Never    Sexual activity: Not Currently     Family History   Problem Relation Age of Onset    Melanoma Mother 75        alive currently 79    Uterine cancer Mother     Skin cancer Mother     Atrial fibrillation Father     Hypertension Father     Hearing loss Father     Bipolar disorder Daughter     COPD  Maternal Grandmother     Depression Maternal Grandmother     Lung cancer Maternal Grandfather     Diabetes Paternal Grandmother     Depression Paternal Grandmother     Diabetes Paternal Grandfather     Asthma Daughter         Donovan Santo Hyperthermia Neg Hx         Review of Systems:  CONSTITUTIONAL: No weight loss, fever, chills, weakness or fatigue.  HEENT: Eyes: No visual loss, blurred vision, double vision or yellow sclerae. Ears, Nose, Throat: No hearing loss, sneezing, congestion, runny nose or sore throat.  SKIN: No rash or itching.  CARDIOVASCULAR: No chest pain, chest pressure or chest discomfort. No palpitations or edema.  RESPIRATORY: No shortness of breath, cough or sputum.  GASTROINTESTINAL: No anorexia, nausea, vomiting or diarrhea. No abdominal pain or blood.  GENITOURINARY: No burning on urination  NEUROLOGICAL: No headache, dizziness, syncope, paralysis, ataxia, numbness or tingling in the extremities. No change in bowel or bladder control.  MUSCULOSKELETAL: No muscle, back pain, joint pain or stiffness.  HEMATOLOGIC: No anemia, bleeding or bruising.  LYMPHATICS: No enlarged nodes.  PSYCHIATRIC: No history of depression or anxiety.  ENDOCRINOLOGIC: No reports of sweating, cold or heat intolerance. No polyuria or polydipsia.  ALLERGIES: No history of asthma, hives, eczema or rhinitis.    Physical Exam:  Vitals:    06/16/25 1343   BP: 138/88   Pulse: 103   SpO2: 97%         General: Alert, cooperative    HEENT: Atraumatic, normocephalic    Oral/Maxillofacial: Moist mucous membranes    Neck: Supple     Lungs: EWOB, clear to auscultation bilaterally     Heart: RRR    Breast: Bilateral breasts examined sitting and supine.  RIGHT- no masses, skin changes, or nipple discharge  LEFT- no masses, skin changes, or nipple discharge. Left breast lymphedema, worse centrally and in inferior pole    Lymphatics:  Bilateral supraclavicular, cervical, and axillary basins without lymphadneopathy    Abdomen: Soft,  non-tender, non-distended    Extremities: normal strength and sensation.  No obvious deformities.     Neuro: alert, normal speech, no focal findings or movement disorder noted     Skin: no lesions or abrasions    Recent Imagin25 bilateral screening mammogram at [facility]  Breast are heterogeneously dense  Area of skin thickening in area of trabecular thickening and area of architectural distortion and a postsurgical scar in the left breast, finding is compatible with previous lumpectomy and postradiation changes.  In the right breast no suspicious masses calcifications or other abnormalities are seen.  Impression:  Area of skin thickening and trabecular thickening, area of architectural distortion and surgical scar in the left breast is benign negative  Screening mammogram in 1 year is recommended  BI-RADS 2    Assessment/Plan: Sd Man is a 60 y.o.female with h/o Stage IIB, left breast cancer, DARSHANA. Surgery completed: 24 - left partial mastectomy, sentinel lymph node biopsy  - RTC 6 months for clinical breast exam  - Next imaging due 2026 at [facility]  - continue self breast massage and lymphatic massage.  - nurse navigation for connection regarding gym membership and community lymphatic massage  - Lymphedema established, complete self lymphatic massage to breast, improved thickening to axillary incision.      Moira Mccain MD  Breast Surgical Oncology  I spent 20 minutes caring for Sd on this date of service. This time includes time spent by me in the following activities: preparing for the visit, reviewing tests, obtaining and/or reviewing a separately obtained history, performing a medically appropriate examination and/or evaluation , counseling and educating the patient/family/caregiver, referring and communicating with other health care professionals , documenting information in the medical record, independently interpreting results and communicating that information with the patient/family/caregiver,  and care coordination.    Return in about 6 months (around 12/16/2025) for Next scheduled follow up.

## 2025-06-17 ENCOUNTER — RESULTS FOLLOW-UP (OUTPATIENT)
Dept: CARDIOLOGY | Age: 60
End: 2025-06-17
Payer: COMMERCIAL

## 2025-06-17 NOTE — TELEPHONE ENCOUNTER
Please let her know that echocardiogram looks good.  Her heart is strong and functioning well.  This is improved from her prior study in November 2024 and similar to her baseline study in June 2024.

## 2025-06-20 ENCOUNTER — TELEPHONE (OUTPATIENT)
Dept: SLEEP MEDICINE | Facility: HOSPITAL | Age: 60
End: 2025-06-20
Payer: COMMERCIAL

## 2025-06-20 NOTE — TELEPHONE ENCOUNTER
Spoke with patient , [place] [facility] is requesting pt return device [place] to [place]. Pt will return device , [facility]

## 2025-06-26 ENCOUNTER — TELEPHONE (OUTPATIENT)
Dept: SURGERY | Facility: CLINIC | Age: 60
End: 2025-06-26
Payer: COMMERCIAL

## 2025-07-24 RX ORDER — ONDANSETRON 8 MG/1
TABLET, FILM COATED ORAL
Qty: 60 TABLET | Refills: 0 | Status: SHIPPED | OUTPATIENT
Start: 2025-07-24

## 2025-07-28 ENCOUNTER — TELEPHONE (OUTPATIENT)
Dept: ONCOLOGY | Facility: CLINIC | Age: 60
End: 2025-07-28
Payer: COMMERCIAL

## 2025-07-28 ENCOUNTER — PRIOR AUTHORIZATION (OUTPATIENT)
Dept: ONCOLOGY | Facility: HOSPITAL | Age: 60
End: 2025-07-28
Payer: COMMERCIAL

## 2025-07-28 NOTE — TELEPHONE ENCOUNTER
Provider: Denton  Caller: patient  Relationship to Patient: self  Call Back Phone Number: [phone]   Reason for Call: Pt needs Zofran refilled at [facility] in [place]  [phone].Need luke auth done [facility] [phone] for Zofran.

## 2025-07-28 NOTE — TELEPHONE ENCOUNTER
Provider: Denton  Caller: patient  Relationship to Patient: self  Call Back Phone Number: [phone]   Reason for Call: Pt needs Zofran refilled at [facility] in [place]  [phone].Need luke auth done [facility] [phone] for Zofran.

## 2025-07-28 NOTE — TELEPHONE ENCOUNTER
Provider: Denton  Caller: patient  Relationship to Patient: self  Call Back Phone Number: [phone]   Reason for Call: Pt needs Zofran refilled at [facility] in [place]  [phone].Need luke auth done [facility] [phone] for Zofran.

## 2025-07-28 NOTE — TELEPHONE ENCOUNTER
Outcome  Approved today by [employer] [facility] 2017  Your PA request has been approved. Additional information will be provided in the approval communication. (Message [id number])  Effective Date: 7/28/2025  Authorization Expiration Date: 1/28/2026  Drug  Ondansetron HCl 8MG tablets  ePA cloud logo  Form  [employer] Electronic PA Form (2017 [employer])

## 2025-07-31 ENCOUNTER — PATIENT OUTREACH (OUTPATIENT)
Dept: OTHER | Facility: HOSPITAL | Age: 60
End: 2025-07-31
Payer: COMMERCIAL

## 2025-07-31 NOTE — PROGRESS NOTES
Reviewing chart per Dr. Mccain and noted patient has needs related to lymphatic massage and community gym membership. Called Sd and OBI with contact info for massage therapy and asked her to call back to discuss the [employer] program as well.

## 2025-08-11 ENCOUNTER — HOSPITAL ENCOUNTER (OUTPATIENT)
Dept: PHYSICAL THERAPY | Facility: HOSPITAL | Age: 60
Setting detail: THERAPIES SERIES
Discharge: HOME | End: 2025-08-11
Payer: COMMERCIAL

## 2025-08-11 ENCOUNTER — TELEPHONE (OUTPATIENT)
Dept: ONCOLOGY | Facility: CLINIC | Age: 60
End: 2025-08-11
Payer: COMMERCIAL

## 2025-08-11 DIAGNOSIS — Z98.890 S/P LUMPECTOMY, LEFT BREAST: ICD-10-CM

## 2025-08-11 DIAGNOSIS — Z17.0 MALIGNANT NEOPLASM OF UPPER-OUTER QUADRANT OF LEFT BREAST IN FEMALE, ESTROGEN RECEPTOR POSITIVE: Primary | ICD-10-CM

## 2025-08-11 DIAGNOSIS — C50.412 MALIGNANT NEOPLASM OF UPPER-OUTER QUADRANT OF LEFT BREAST IN FEMALE, ESTROGEN RECEPTOR POSITIVE: Primary | ICD-10-CM

## 2025-08-11 DIAGNOSIS — I89.0 LYMPHEDEMA OF BREAST: ICD-10-CM

## 2025-08-11 PROCEDURE — 97140 MANUAL THERAPY 1/> REGIONS: CPT

## (undated) DEVICE — CONTAINER,SPECIMEN,OR STERILE,4OZ: Brand: MEDLINE

## (undated) DEVICE — GLV SURG BIOGEL LTX PF 7

## (undated) DEVICE — Device

## (undated) DEVICE — ADHS SKIN SURG TISS VISC PREMIERPRO EXOFIN HI/VISC FAST/DRY

## (undated) DEVICE — DRP SLUSH WARMR MACH CIR 44X44IN

## (undated) DEVICE — SUT MNCRYL PLS ANTIB UD 4/0 PS2 18IN

## (undated) DEVICE — DEV TUNNELING SUBCONTANIOUS

## (undated) DEVICE — Device: Brand: FABCO

## (undated) DEVICE — STRIP,CLOSURE,WOUND,MEDI-STRIP,1/2X4: Brand: MEDLINE

## (undated) DEVICE — PAD,ABDOMINAL,8"X10",ST,LF: Brand: MEDLINE

## (undated) DEVICE — ANTIBACTERIAL UNDYED BRAIDED (POLYGLACTIN 910), SYNTHETIC ABSORBABLE SUTURE: Brand: COATED VICRYL

## (undated) DEVICE — CVR PROB GEN PURP W ISOSILK 6X48

## (undated) DEVICE — PK UNIV COMPL 40

## (undated) DEVICE — SYR LUERLOK 5CC

## (undated) DEVICE — NDL HYPO PRECISIONGLIDE REG 25G 1 1/2

## (undated) DEVICE — SYR LL TP 10ML STRL

## (undated) DEVICE — LEGGINGS, PAIR, 31X48, STERILE: Brand: MEDLINE

## (undated) DEVICE — APPL CHLORAPREP HI/LITE 26ML ORNG

## (undated) DEVICE — SOL NS 500ML

## (undated) DEVICE — SUT PROLN 2/0 SH 36IN 8523H

## (undated) DEVICE — ELECTRD BLD EZ CLN MOD XLNG 2.75IN

## (undated) DEVICE — INTENDED FOR TISSUE SEPARATION, AND OTHER PROCEDURES THAT REQUIRE A SHARP SURGICAL BLADE TO PUNCTURE OR CUT.: Brand: BARD-PARKER ® CARBON RIB-BACK BLADES

## (undated) DEVICE — LOU MINOR PROCEDURE: Brand: MEDLINE INDUSTRIES, INC.

## (undated) DEVICE — STPLR SKIN VISISTAT WD 35CT

## (undated) DEVICE — TRAP FLD MINIVAC MEGADYNE 100ML

## (undated) DEVICE — PENCL SMOKE/EVAC MEGADYNE TELESCP 10FT

## (undated) DEVICE — DRSNG SURESITE WNDW 4X4.5

## (undated) DEVICE — TOWEL,OR,DSP,ST,BLUE,STD,4/PK,20PK/CS: Brand: MEDLINE

## (undated) DEVICE — SMOKE EVACUATION TUBING WITH 7/8 IN TO 1/4 IN REDUCER: Brand: BUFFALO FILTER

## (undated) DEVICE — PATIENT RETURN ELECTRODE, SINGLE-USE, CONTACT QUALITY MONITORING, ADULT, WITH 9FT CORD, FOR PATIENTS WEIGING OVER 33LBS. (15KG): Brand: MEGADYNE

## (undated) DEVICE — HYPODERMIC SAFETY NEEDLE: Brand: MONOJECT

## (undated) DEVICE — SPNG GZ WOVN 4X4IN 12PLY 10/BX STRL

## (undated) DEVICE — SUT SILK 2/0 SH 30IN K833H

## (undated) DEVICE — KT INK TISS MARGINMARKER STD 6COLOR

## (undated) DEVICE — BANDAGE,GAUZE,BULKEE II,4.5"X4.1YD,STRL: Brand: MEDLINE

## (undated) DEVICE — SHEATH GUIDE SCOUT SURG TPR 8.3TO3.2CM 264CM STRL

## (undated) DEVICE — GLV SURG SENSICARE PI LF PF 7.5 GRN STRL

## (undated) DEVICE — CONN TBG Y 5 IN 1 LF STRL

## (undated) DEVICE — CVR TRANSD CIV FLX TPR 11.9 TO 3.8X61CM

## (undated) DEVICE — DECANTER BAG 9": Brand: MEDLINE INDUSTRIES, INC.

## (undated) DEVICE — COVER,C-ARM,41X74: Brand: MEDLINE